# Patient Record
Sex: FEMALE | Race: WHITE | NOT HISPANIC OR LATINO | Employment: UNEMPLOYED | URBAN - METROPOLITAN AREA
[De-identification: names, ages, dates, MRNs, and addresses within clinical notes are randomized per-mention and may not be internally consistent; named-entity substitution may affect disease eponyms.]

---

## 2018-03-17 ENCOUNTER — APPOINTMENT (OUTPATIENT)
Dept: CT IMAGING | Facility: HOSPITAL | Age: 58
End: 2018-03-17

## 2018-03-17 ENCOUNTER — HOSPITAL ENCOUNTER (INPATIENT)
Facility: HOSPITAL | Age: 58
LOS: 4 days | Discharge: HOME | End: 2018-03-21
Attending: EMERGENCY MEDICINE | Admitting: INTERNAL MEDICINE

## 2018-03-17 DIAGNOSIS — R07.89 CHEST TIGHTNESS: ICD-10-CM

## 2018-03-17 DIAGNOSIS — J45.909 ASTHMA, UNSPECIFIED ASTHMA SEVERITY, UNSPECIFIED WHETHER COMPLICATED, UNSPECIFIED WHETHER PERSISTENT: ICD-10-CM

## 2018-03-17 DIAGNOSIS — Z02.89 REFERRED BY HEALTH CARE PROFESSIONAL: ICD-10-CM

## 2018-03-17 DIAGNOSIS — J18.9 PNEUMONIA OF BOTH LUNGS DUE TO INFECTIOUS ORGANISM, UNSPECIFIED PART OF LUNG: Primary | ICD-10-CM

## 2018-03-17 PROBLEM — R05.9 COUGH: Status: ACTIVE | Noted: 2018-03-17

## 2018-03-17 LAB
ALBUMIN SERPL-MCNC: 3.9 G/DL (ref 3.2–4.8)
ALBUMIN/GLOB SERPL: 1.1 G/DL (ref 1.5–2.5)
ALP SERPL-CCNC: 70 U/L (ref 25–100)
ALT SERPL W P-5'-P-CCNC: 43 U/L (ref 7–40)
ANION GAP SERPL CALCULATED.3IONS-SCNC: 6 MMOL/L (ref 3–11)
AST SERPL-CCNC: 38 U/L (ref 0–33)
BASOPHILS # BLD AUTO: 0.04 10*3/MM3 (ref 0–0.2)
BASOPHILS NFR BLD AUTO: 0.6 % (ref 0–1)
BILIRUB SERPL-MCNC: 0.8 MG/DL (ref 0.3–1.2)
BNP SERPL-MCNC: 120 PG/ML (ref 0–100)
BUN BLD-MCNC: 22 MG/DL (ref 9–23)
BUN/CREAT SERPL: 27.5 (ref 7–25)
CALCIUM SPEC-SCNC: 9.1 MG/DL (ref 8.7–10.4)
CHLORIDE SERPL-SCNC: 102 MMOL/L (ref 99–109)
CO2 SERPL-SCNC: 30 MMOL/L (ref 20–31)
CREAT BLD-MCNC: 0.8 MG/DL (ref 0.6–1.3)
D-LACTATE SERPL-SCNC: 1.3 MMOL/L (ref 0.5–2)
DEPRECATED RDW RBC AUTO: 45.2 FL (ref 37–54)
EOSINOPHIL # BLD AUTO: 0 10*3/MM3 (ref 0–0.3)
EOSINOPHIL NFR BLD AUTO: 0 % (ref 0–3)
ERYTHROCYTE [DISTWIDTH] IN BLOOD BY AUTOMATED COUNT: 14.9 % (ref 11.3–14.5)
FLUAV AG NPH QL: NEGATIVE
FLUBV AG NPH QL IA: NEGATIVE
GFR SERPL CREATININE-BSD FRML MDRD: 74 ML/MIN/1.73
GLOBULIN UR ELPH-MCNC: 3.4 GM/DL
GLUCOSE BLD-MCNC: 165 MG/DL (ref 70–100)
HCT VFR BLD AUTO: 39 % (ref 34.5–44)
HGB BLD-MCNC: 12.8 G/DL (ref 11.5–15.5)
HOLD SPECIMEN: NORMAL
HOLD SPECIMEN: NORMAL
IMM GRANULOCYTES # BLD: 0.03 10*3/MM3 (ref 0–0.03)
IMM GRANULOCYTES NFR BLD: 0.4 % (ref 0–0.6)
LYMPHOCYTES # BLD AUTO: 1.59 10*3/MM3 (ref 0.6–4.8)
LYMPHOCYTES NFR BLD AUTO: 22.3 % (ref 24–44)
MCH RBC QN AUTO: 27.2 PG (ref 27–31)
MCHC RBC AUTO-ENTMCNC: 32.8 G/DL (ref 32–36)
MCV RBC AUTO: 83 FL (ref 80–99)
MONOCYTES # BLD AUTO: 0.58 10*3/MM3 (ref 0–1)
MONOCYTES NFR BLD AUTO: 8.1 % (ref 0–12)
NEUTROPHILS # BLD AUTO: 4.88 10*3/MM3 (ref 1.5–8.3)
NEUTROPHILS NFR BLD AUTO: 68.6 % (ref 41–71)
PLAT MORPH BLD: NORMAL
PLATELET # BLD AUTO: 239 10*3/MM3 (ref 150–450)
PMV BLD AUTO: 9.3 FL (ref 6–12)
POTASSIUM BLD-SCNC: 3.4 MMOL/L (ref 3.5–5.5)
PROT SERPL-MCNC: 7.3 G/DL (ref 5.7–8.2)
RBC # BLD AUTO: 4.7 10*6/MM3 (ref 3.89–5.14)
RBC MORPH BLD: NORMAL
SODIUM BLD-SCNC: 138 MMOL/L (ref 132–146)
TROPONIN I SERPL-MCNC: 0.03 NG/ML (ref 0–0.07)
WBC MORPH BLD: NORMAL
WBC NRBC COR # BLD: 7.12 10*3/MM3 (ref 3.5–10.8)
WHOLE BLOOD HOLD SPECIMEN: NORMAL
WHOLE BLOOD HOLD SPECIMEN: NORMAL

## 2018-03-17 PROCEDURE — 87040 BLOOD CULTURE FOR BACTERIA: CPT | Performed by: EMERGENCY MEDICINE

## 2018-03-17 PROCEDURE — 94760 N-INVAS EAR/PLS OXIMETRY 1: CPT

## 2018-03-17 PROCEDURE — G0378 HOSPITAL OBSERVATION PER HR: HCPCS

## 2018-03-17 PROCEDURE — 99223 1ST HOSP IP/OBS HIGH 75: CPT | Performed by: INTERNAL MEDICINE

## 2018-03-17 PROCEDURE — 83880 ASSAY OF NATRIURETIC PEPTIDE: CPT

## 2018-03-17 PROCEDURE — 83605 ASSAY OF LACTIC ACID: CPT | Performed by: EMERGENCY MEDICINE

## 2018-03-17 PROCEDURE — 85007 BL SMEAR W/DIFF WBC COUNT: CPT

## 2018-03-17 PROCEDURE — 94799 UNLISTED PULMONARY SVC/PX: CPT

## 2018-03-17 PROCEDURE — 93005 ELECTROCARDIOGRAM TRACING: CPT | Performed by: EMERGENCY MEDICINE

## 2018-03-17 PROCEDURE — 80053 COMPREHEN METABOLIC PANEL: CPT

## 2018-03-17 PROCEDURE — 84484 ASSAY OF TROPONIN QUANT: CPT

## 2018-03-17 PROCEDURE — 71250 CT THORAX DX C-: CPT

## 2018-03-17 PROCEDURE — 99284 EMERGENCY DEPT VISIT MOD MDM: CPT

## 2018-03-17 PROCEDURE — 87804 INFLUENZA ASSAY W/OPTIC: CPT | Performed by: EMERGENCY MEDICINE

## 2018-03-17 PROCEDURE — 85025 COMPLETE CBC W/AUTO DIFF WBC: CPT

## 2018-03-17 PROCEDURE — 93005 ELECTROCARDIOGRAM TRACING: CPT

## 2018-03-17 PROCEDURE — 25010000002 DEXAMETHASONE PER 1 MG: Performed by: EMERGENCY MEDICINE

## 2018-03-17 RX ORDER — SODIUM CHLORIDE 9 MG/ML
100 INJECTION, SOLUTION INTRAVENOUS CONTINUOUS
Status: CANCELLED | OUTPATIENT
Start: 2018-03-17 | End: 2018-03-18

## 2018-03-17 RX ORDER — ONDANSETRON 4 MG/1
4 TABLET, FILM COATED ORAL EVERY 6 HOURS PRN
Status: DISCONTINUED | OUTPATIENT
Start: 2018-03-17 | End: 2018-03-21 | Stop reason: HOSPADM

## 2018-03-17 RX ORDER — LEVOFLOXACIN 750 MG/1
750 TABLET ORAL ONCE
Status: COMPLETED | OUTPATIENT
Start: 2018-03-17 | End: 2018-03-17

## 2018-03-17 RX ORDER — MAGNESIUM SULFATE HEPTAHYDRATE 40 MG/ML
2 INJECTION, SOLUTION INTRAVENOUS AS NEEDED
Status: DISCONTINUED | OUTPATIENT
Start: 2018-03-17 | End: 2018-03-21 | Stop reason: HOSPADM

## 2018-03-17 RX ORDER — DEXAMETHASONE SODIUM PHOSPHATE 4 MG/ML
4 INJECTION, SOLUTION INTRA-ARTICULAR; INTRALESIONAL; INTRAMUSCULAR; INTRAVENOUS; SOFT TISSUE ONCE
Status: COMPLETED | OUTPATIENT
Start: 2018-03-17 | End: 2018-03-17

## 2018-03-17 RX ORDER — POTASSIUM CHLORIDE 750 MG/1
40 CAPSULE, EXTENDED RELEASE ORAL AS NEEDED
Status: DISCONTINUED | OUTPATIENT
Start: 2018-03-17 | End: 2018-03-21 | Stop reason: HOSPADM

## 2018-03-17 RX ORDER — SODIUM CHLORIDE 0.9 % (FLUSH) 0.9 %
1-10 SYRINGE (ML) INJECTION AS NEEDED
Status: DISCONTINUED | OUTPATIENT
Start: 2018-03-17 | End: 2018-03-21 | Stop reason: HOSPADM

## 2018-03-17 RX ORDER — ONDANSETRON 2 MG/ML
4 INJECTION INTRAMUSCULAR; INTRAVENOUS EVERY 6 HOURS PRN
Status: DISCONTINUED | OUTPATIENT
Start: 2018-03-17 | End: 2018-03-21 | Stop reason: HOSPADM

## 2018-03-17 RX ORDER — ACETAMINOPHEN 325 MG/1
650 TABLET ORAL EVERY 4 HOURS PRN
Status: DISCONTINUED | OUTPATIENT
Start: 2018-03-17 | End: 2018-03-21 | Stop reason: HOSPADM

## 2018-03-17 RX ORDER — LEVOFLOXACIN 5 MG/ML
500 INJECTION, SOLUTION INTRAVENOUS EVERY 24 HOURS
Status: DISCONTINUED | OUTPATIENT
Start: 2018-03-18 | End: 2018-03-21 | Stop reason: HOSPADM

## 2018-03-17 RX ORDER — SODIUM CHLORIDE 0.9 % (FLUSH) 0.9 %
10 SYRINGE (ML) INJECTION AS NEEDED
Status: DISCONTINUED | OUTPATIENT
Start: 2018-03-17 | End: 2018-03-21 | Stop reason: HOSPADM

## 2018-03-17 RX ORDER — MAGNESIUM SULFATE HEPTAHYDRATE 40 MG/ML
4 INJECTION, SOLUTION INTRAVENOUS AS NEEDED
Status: DISCONTINUED | OUTPATIENT
Start: 2018-03-17 | End: 2018-03-21 | Stop reason: HOSPADM

## 2018-03-17 RX ORDER — POTASSIUM CHLORIDE 7.45 MG/ML
10 INJECTION INTRAVENOUS
Status: DISCONTINUED | OUTPATIENT
Start: 2018-03-17 | End: 2018-03-21 | Stop reason: HOSPADM

## 2018-03-17 RX ORDER — IPRATROPIUM BROMIDE AND ALBUTEROL SULFATE 2.5; .5 MG/3ML; MG/3ML
3 SOLUTION RESPIRATORY (INHALATION)
Status: DISCONTINUED | OUTPATIENT
Start: 2018-03-17 | End: 2018-03-19

## 2018-03-17 RX ORDER — POTASSIUM CHLORIDE 1.5 G/1.77G
40 POWDER, FOR SOLUTION ORAL AS NEEDED
Status: DISCONTINUED | OUTPATIENT
Start: 2018-03-17 | End: 2018-03-21 | Stop reason: HOSPADM

## 2018-03-17 RX ADMIN — LEVOFLOXACIN 750 MG: 750 TABLET, FILM COATED ORAL at 18:35

## 2018-03-17 RX ADMIN — IPRATROPIUM BROMIDE AND ALBUTEROL SULFATE 3 ML: 2.5; .5 SOLUTION RESPIRATORY (INHALATION) at 21:04

## 2018-03-17 RX ADMIN — DEXAMETHASONE SODIUM PHOSPHATE 4 MG: 4 INJECTION, SOLUTION INTRAMUSCULAR; INTRAVENOUS at 18:37

## 2018-03-17 NOTE — ED PROVIDER NOTES
Subjective   Jud Garnica is a 57 y.o. female with a hx of asthma who presents to the ED with c/o SoA. The patients states that she has been feeling unwell for over a week and also notes of associated congestion, chest pain, and a productive cough that produces green material. She was seen at  and was given azithromycin and steroids 1 week ago but states that the medication was ineffective. She reported to the [facility] today and was sent to the ED after a chest xray showed left lower lobe PNA. She denies fever, or any other acute complaints at this time. The patient has no hx of smoking.        History provided by:  Patient  Shortness of Breath   Severity:  Unable to specify  Onset quality:  Gradual  Duration: Over 1 week.  Timing:  Constant  Progression:  Worsening  Chronicity:  New  Relieved by:  Nothing  Worsened by:  Nothing  Ineffective treatments: Steroids and abx.  Associated symptoms: chest pain and cough    Associated symptoms: no fever    Risk factors: no tobacco use        Review of Systems   Constitutional: Negative for fever.   HENT: Positive for congestion.    Respiratory: Positive for cough and shortness of breath.    Cardiovascular: Positive for chest pain.   All other systems reviewed and are negative.      Past Medical History:   Diagnosis Date   • Asthma    • Uterine cancer        Allergies   Allergen Reactions   • Codeine Irritability   • Nyquil Multi-Symptom [Pseudoeph-Doxylamine-Dm-Apap] Irritability       Past Surgical History:   Procedure Laterality Date   • HYSTERECTOMY         History reviewed. No pertinent family history.    Social History     Social History   • Marital status: Single     Social History Main Topics   • Smoking status: Never Smoker   • Alcohol use No   • Drug use: No     Other Topics Concern   • Not on file         Objective   Physical Exam   Constitutional: She is oriented to person, place, and time. She appears well-developed and well-nourished. No  distress.   HENT:   Head: Normocephalic and atraumatic.   Nose: Nose normal.   Airway intact   Eyes: Conjunctivae are normal. No scleral icterus.   Neck: Normal range of motion. Neck supple.   Cardiovascular: Normal rate, regular rhythm, normal heart sounds and intact distal pulses.    No murmur heard.  Pulmonary/Chest: Effort normal. No respiratory distress. She has rhonchi in the right middle field and the right lower field.   Abdominal: Soft. Bowel sounds are normal.   Musculoskeletal: Normal range of motion. She exhibits no edema.   Neurological: She is alert and oriented to person, place, and time.   Skin: Skin is warm and dry.   Psychiatric: She has a normal mood and affect. Her behavior is normal.   Nursing note and vitals reviewed.      Procedures         ED Course  ED Course   Comment By Time   The patient was sent here by the urgent treatment center for further evaluation.  The patient has already been on antibiotics this week and has finished them.  The patient took azithromycin.  She reports no improvement or symptoms.  No exposure to smoke.  She does have a history of asthma.  The patient reports she is no longer having a fever. Valdez Johnston MD 03/17 1942   The patient has evidence of bilateral lower lobe pneumonia with milder disease in the left lingula and right middle lobe.  Secondary to the fact the patient has already completed a course of antibiotics with apparent failed therapy, we will admit her for further evaluation. Valdez Johnston MD 03/17 9107   Dr. Johnston discussed with Dr. Moran, hospitalist, who agrees to admit the patient. Jaylon SANCHEZ Taylor 03/17 1931     Recent Results (from the past 24 hour(s))   Comprehensive Metabolic Panel    Collection Time: 03/17/18  3:09 PM   Result Value Ref Range    Glucose 165 (H) 70 - 100 mg/dL    BUN 22 9 - 23 mg/dL    Creatinine 0.80 0.60 - 1.30 mg/dL    Sodium 138 132 - 146 mmol/L    Potassium 3.4 (L) 3.5 - 5.5 mmol/L    Chloride 102 99 - 109  mmol/L    CO2 30.0 20.0 - 31.0 mmol/L    Calcium 9.1 8.7 - 10.4 mg/dL    Total Protein 7.3 5.7 - 8.2 g/dL    Albumin 3.90 3.20 - 4.80 g/dL    ALT (SGPT) 43 (H) 7 - 40 U/L    AST (SGOT) 38 (H) 0 - 33 U/L    Alkaline Phosphatase 70 25 - 100 U/L    Total Bilirubin 0.8 0.3 - 1.2 mg/dL    eGFR Non African Amer 74 >60 mL/min/1.73    Globulin 3.4 gm/dL    A/G Ratio 1.1 (L) 1.5 - 2.5 g/dL    BUN/Creatinine Ratio 27.5 (H) 7.0 - 25.0    Anion Gap 6.0 3.0 - 11.0 mmol/L   BNP    Collection Time: 03/17/18  3:09 PM   Result Value Ref Range    .0 (H) 0.0 - 100.0 pg/mL   Light Blue Top    Collection Time: 03/17/18  3:09 PM   Result Value Ref Range    Extra Tube hold for add-on    Green Top (Gel)    Collection Time: 03/17/18  3:09 PM   Result Value Ref Range    Extra Tube Hold for add-ons.    Lavender Top    Collection Time: 03/17/18  3:09 PM   Result Value Ref Range    Extra Tube hold for add-on    Gold Top - SST    Collection Time: 03/17/18  3:09 PM   Result Value Ref Range    Extra Tube Hold for add-ons.    CBC Auto Differential    Collection Time: 03/17/18  3:09 PM   Result Value Ref Range    WBC 7.12 3.50 - 10.80 10*3/mm3    RBC 4.70 3.89 - 5.14 10*6/mm3    Hemoglobin 12.8 11.5 - 15.5 g/dL    Hematocrit 39.0 34.5 - 44.0 %    MCV 83.0 80.0 - 99.0 fL    MCH 27.2 27.0 - 31.0 pg    MCHC 32.8 32.0 - 36.0 g/dL    RDW 14.9 (H) 11.3 - 14.5 %    RDW-SD 45.2 37.0 - 54.0 fl    MPV 9.3 6.0 - 12.0 fL    Platelets 239 150 - 450 10*3/mm3    Neutrophil % 68.6 41.0 - 71.0 %    Lymphocyte % 22.3 (L) 24.0 - 44.0 %    Monocyte % 8.1 0.0 - 12.0 %    Eosinophil % 0.0 0.0 - 3.0 %    Basophil % 0.6 0.0 - 1.0 %    Immature Grans % 0.4 0.0 - 0.6 %    Neutrophils, Absolute 4.88 1.50 - 8.30 10*3/mm3    Lymphocytes, Absolute 1.59 0.60 - 4.80 10*3/mm3    Monocytes, Absolute 0.58 0.00 - 1.00 10*3/mm3    Eosinophils, Absolute 0.00 0.00 - 0.30 10*3/mm3    Basophils, Absolute 0.04 0.00 - 0.20 10*3/mm3    Immature Grans, Absolute 0.03 0.00 - 0.03 10*3/mm3  "  Scan Slide    Collection Time: 03/17/18  3:09 PM   Result Value Ref Range    RBC Morphology Normal Normal    WBC Morphology Normal Normal    Platelet Morphology Normal Normal   POC Troponin, Rapid    Collection Time: 03/17/18  3:14 PM   Result Value Ref Range    Troponin I 0.03 0.00 - 0.07 ng/mL   Lactic Acid, Plasma    Collection Time: 03/17/18  6:05 PM   Result Value Ref Range    Lactate 1.3 0.5 - 2.0 mmol/L   Influenza Antigen, Rapid - Swab, Nasopharynx    Collection Time: 03/17/18  6:10 PM   Result Value Ref Range    Influenza A Ag, EIA Negative Negative    Influenza B Ag, EIA Negative Negative     Note: In addition to lab results from this visit, the labs listed above may include labs taken at another facility or during a different encounter within the last 24 hours. Please correlate lab times with ED admission and discharge times for further clarification of the services performed during this visit.    CT Chest Without Contrast   Preliminary Result   Multifocal pneumonia with moderate disease of both medial   right and left lower lobes and much milder lingular and middle lobe   disease.       DICTATED:     03/17/2018   EDITED/ls :     03/17/2018             Vitals:    03/17/18 1830 03/17/18 1953 03/17/18 2041 03/17/18 2104   BP: 109/66 128/79 118/78    BP Location:   Left arm    Patient Position:   Sitting    Pulse:  85 74 67   Resp:  16 16 16   Temp:  98.1 °F (36.7 °C) 98.1 °F (36.7 °C)    TempSrc:   Oral    SpO2: 95% 97%  94%   Weight:   53.7 kg (118 lb 6.4 oz)    Height:   167.6 cm (66\")      Medications   sodium chloride 0.9 % flush 10 mL (not administered)   levoFLOXacin (LEVAQUIN) 500 mg/100 mL D5W (premix) (LEVAQUIN) 500 mg (not administered)   sodium chloride 0.9 % flush 1-10 mL (not administered)   enoxaparin (LOVENOX) syringe 40 mg (40 mg Subcutaneous Not Given 3/17/18 2102)   acetaminophen (TYLENOL) tablet 650 mg (not administered)   potassium chloride (MICRO-K) CR capsule 40 mEq (not " administered)     Or   potassium chloride (KLOR-CON) packet 40 mEq (not administered)     Or   potassium chloride 10 mEq in 100 mL IVPB (not administered)   Magnesium Sulfate 2 gram Bolus, followed by 8 gram infusion (total Mg dose 10 grams)- Mg less than or equal to 1mg/dL (not administered)     Or   Magnesium Sulfate 6 gram Infusion (2 gm x 3) -Mg 1.1 -1.5 mg/dL (not administered)     Or   magnesium sulfate 4 gram infusion- Mg 1.6-1.9 mg/dL (not administered)   ondansetron (ZOFRAN) tablet 4 mg (not administered)     Or   ondansetron (ZOFRAN) injection 4 mg (not administered)   ipratropium-albuterol (DUO-NEB) nebulizer solution 3 mL (3 mL Nebulization Given 3/17/18 2104)   HYDROcod Polst-CPM Polst ER (TUSSIONEX PENNKINETIC) 10-8 MG/5ML ER suspension 2.5 mL (not administered)   pneumococcal polysaccharide 23-valent (PNEUMOVAX-23) vaccine 0.5 mL (not administered)   levoFLOXacin (LEVAQUIN) tablet 750 mg (750 mg Oral Given 3/17/18 1835)   dexamethasone (DECADRON) injection 4 mg (4 mg Intravenous Given 3/17/18 1837)     ECG/EMG Results (last 24 hours)     Procedure Component Value Units Date/Time    ECG 12 Lead [[id number]] Collected:  03/17/18 1504     Updated:  03/17/18 1505                        MDM  Number of Diagnoses or Management Options  Asthma, unspecified asthma severity, unspecified whether complicated, unspecified whether persistent:   Chest tightness:   Pneumonia of both lungs due to infectious organism, unspecified part of lung:   Referred by health care professional:   Diagnosis management comments: ECG/EMG Results (last 24 hours)     Procedure Component Value Units Date/Time    ECG 12 Lead ([id number]) Collected:  03/17/18 1504     Updated:  03/17/18 1505             Amount and/or Complexity of Data Reviewed  Clinical lab tests: reviewed  Tests in the radiology section of CPT®: reviewed  Review and summarize past medical records: yes  Discuss the patient with other providers: yes  Independent  visualization of images, tracings, or specimens: yes        Final diagnoses:   Pneumonia of both lungs due to infectious organism, unspecified part of lung   Chest tightness   Asthma, unspecified asthma severity, unspecified whether complicated, unspecified whether persistent   Referred by health care professional     EMR Dragon/Transcription disclaimer:  Much of this encounter note is an electronic transcription/translation of spoken language to printed text. The electronic translation of spoken language may permit erroneous, or at times, nonsensical words or phrases to be inadvertently transcribed; Although I have reviewed the note for such errors, some may still exist.    Documentation assistance provided by jocelyn Taylor.  Information recorded by the jocelyn was done at my direction and has been verified and validated by me.     Jaylon Taylor  03/17/18 1728       Jaylon Taylor  03/17/18 1847       Jaylon Taylor  03/17/18 1851       Jaylon Taylor  03/17/18 1906       Valdez Johnston MD  03/17/18 2638

## 2018-03-18 ENCOUNTER — APPOINTMENT (OUTPATIENT)
Dept: CARDIOLOGY | Facility: HOSPITAL | Age: 58
End: 2018-03-18
Attending: INTERNAL MEDICINE

## 2018-03-18 LAB
ANION GAP SERPL CALCULATED.3IONS-SCNC: 7 MMOL/L (ref 3–11)
BUN BLD-MCNC: 15 MG/DL (ref 9–23)
BUN/CREAT SERPL: 21.4 (ref 7–25)
CALCIUM SPEC-SCNC: 8.6 MG/DL (ref 8.7–10.4)
CHLORIDE SERPL-SCNC: 105 MMOL/L (ref 99–109)
CO2 SERPL-SCNC: 27 MMOL/L (ref 20–31)
CREAT BLD-MCNC: 0.7 MG/DL (ref 0.6–1.3)
DEPRECATED RDW RBC AUTO: 45 FL (ref 37–54)
ERYTHROCYTE [DISTWIDTH] IN BLOOD BY AUTOMATED COUNT: 14.8 % (ref 11.3–14.5)
GFR SERPL CREATININE-BSD FRML MDRD: 86 ML/MIN/1.73
GLUCOSE BLD-MCNC: 121 MG/DL (ref 70–100)
HCT VFR BLD AUTO: 36.8 % (ref 34.5–44)
HGB BLD-MCNC: 11.9 G/DL (ref 11.5–15.5)
MCH RBC QN AUTO: 26.7 PG (ref 27–31)
MCHC RBC AUTO-ENTMCNC: 32.3 G/DL (ref 32–36)
MCV RBC AUTO: 82.7 FL (ref 80–99)
PLATELET # BLD AUTO: 259 10*3/MM3 (ref 150–450)
PMV BLD AUTO: 9.4 FL (ref 6–12)
POTASSIUM BLD-SCNC: 3.7 MMOL/L (ref 3.5–5.5)
RBC # BLD AUTO: 4.45 10*6/MM3 (ref 3.89–5.14)
SODIUM BLD-SCNC: 139 MMOL/L (ref 132–146)
WBC NRBC COR # BLD: 6.34 10*3/MM3 (ref 3.5–10.8)

## 2018-03-18 PROCEDURE — 85027 COMPLETE CBC AUTOMATED: CPT | Performed by: INTERNAL MEDICINE

## 2018-03-18 PROCEDURE — G0378 HOSPITAL OBSERVATION PER HR: HCPCS

## 2018-03-18 PROCEDURE — 25010000002 LEVOFLOXACIN PER 250 MG: Performed by: INTERNAL MEDICINE

## 2018-03-18 PROCEDURE — 87254 VIRUS INOCULATION SHELL VIA: CPT | Performed by: INTERNAL MEDICINE

## 2018-03-18 PROCEDURE — 94799 UNLISTED PULMONARY SVC/PX: CPT

## 2018-03-18 PROCEDURE — 94640 AIRWAY INHALATION TREATMENT: CPT

## 2018-03-18 PROCEDURE — 99233 SBSQ HOSP IP/OBS HIGH 50: CPT | Performed by: FAMILY MEDICINE

## 2018-03-18 PROCEDURE — 94760 N-INVAS EAR/PLS OXIMETRY 1: CPT

## 2018-03-18 PROCEDURE — 25010000002 ENOXAPARIN PER 10 MG: Performed by: INTERNAL MEDICINE

## 2018-03-18 PROCEDURE — 93306 TTE W/DOPPLER COMPLETE: CPT | Performed by: INTERNAL MEDICINE

## 2018-03-18 PROCEDURE — 93306 TTE W/DOPPLER COMPLETE: CPT

## 2018-03-18 PROCEDURE — 25010000002 METHYLPREDNISOLONE PER 125 MG: Performed by: FAMILY MEDICINE

## 2018-03-18 PROCEDURE — 80048 BASIC METABOLIC PNL TOTAL CA: CPT | Performed by: INTERNAL MEDICINE

## 2018-03-18 RX ORDER — METHYLPREDNISOLONE SODIUM SUCCINATE 125 MG/2ML
60 INJECTION, POWDER, LYOPHILIZED, FOR SOLUTION INTRAMUSCULAR; INTRAVENOUS EVERY 8 HOURS
Status: DISCONTINUED | OUTPATIENT
Start: 2018-03-18 | End: 2018-03-18

## 2018-03-18 RX ORDER — METHYLPREDNISOLONE SODIUM SUCCINATE 125 MG/2ML
60 INJECTION, POWDER, LYOPHILIZED, FOR SOLUTION INTRAMUSCULAR; INTRAVENOUS EVERY 12 HOURS SCHEDULED
Status: DISCONTINUED | OUTPATIENT
Start: 2018-03-18 | End: 2018-03-19

## 2018-03-18 RX ADMIN — IPRATROPIUM BROMIDE AND ALBUTEROL SULFATE 3 ML: 2.5; .5 SOLUTION RESPIRATORY (INHALATION) at 07:16

## 2018-03-18 RX ADMIN — METHYLPREDNISOLONE SODIUM SUCCINATE 60 MG: 125 INJECTION, POWDER, FOR SOLUTION INTRAMUSCULAR; INTRAVENOUS at 20:18

## 2018-03-18 RX ADMIN — IPRATROPIUM BROMIDE AND ALBUTEROL SULFATE 3 ML: 2.5; .5 SOLUTION RESPIRATORY (INHALATION) at 13:31

## 2018-03-18 RX ADMIN — IPRATROPIUM BROMIDE AND ALBUTEROL SULFATE 3 ML: 2.5; .5 SOLUTION RESPIRATORY (INHALATION) at 20:06

## 2018-03-18 RX ADMIN — IPRATROPIUM BROMIDE AND ALBUTEROL SULFATE 3 ML: 2.5; .5 SOLUTION RESPIRATORY (INHALATION) at 15:56

## 2018-03-18 RX ADMIN — HYDROCODONE POLISTIREX AND CHLORPHENIRAMINE POLISTIREX 2.5 ML: 10; 8 SUSPENSION, EXTENDED RELEASE ORAL at 20:18

## 2018-03-18 RX ADMIN — ENOXAPARIN SODIUM 40 MG: 100 INJECTION SUBCUTANEOUS at 20:18

## 2018-03-18 RX ADMIN — LEVOFLOXACIN 500 MG: 5 INJECTION, SOLUTION INTRAVENOUS at 18:27

## 2018-03-18 NOTE — PROGRESS NOTES
[facility] Medicine Services  PROGRESS NOTE    Patient Name: Jud Garnica  : 1960  MRN: [id number]    Date of Admission: 3/17/2018  Length of Stay: 0  Primary Care Physician: No Known Provider    Subjective   Subjective     CC:  cough    HPI:  Pt sitting up  In bed no family at bedside. Still feels sob, chest feels tight.     Review of Systems  Gen- No fevers, chills  CV- No chest pain, palpitations  Resp- pos cough, pos dyspnea  GI- No N/V/D, abd pain      Otherwise ROS is negative except as mentioned in the HPI.    Objective   Objective     Vital Signs:   Temp:  [98.1 °F (36.7 °C)-98.9 °F (37.2 °C)] 98.9 °F (37.2 °C)  Heart Rate:  [67-85] 85  Resp:  [16] 16  BP: (107-128)/(64-79) 113/72        Physical Exam:  Constitutional: mildly labored, awake, alert, room air   HENT: NCAT, mucous membranes moist  Respiratory: decreased air movement, minimal wheeze b/l   Cardiovascular: RRR, no murmurs, rubs, or gallops, palpable pedal pulses bilaterally  Gastrointestinal: Positive bowel sounds, soft, nontender, nondistended  Musculoskeletal: No bilateral ankle edema  Psychiatric: Appropriate affect, cooperative  Neurologic: Oriented x 3, strength symmetric in all extremities, Cranial Nerves grossly intact to confrontation, speech clear  Skin: No rashes      Results Reviewed:  I have personally reviewed current lab, radiology, and data and agree.      Results from last 7 days  Lab Units 18  0812 18  1509   WBC 10*3/mm3 6.34 7.12   HEMOGLOBIN g/dL 11.9 12.8   HEMATOCRIT % 36.8 39.0   PLATELETS 10*3/mm3 259 239       Results from last 7 days  Lab Units 18  0812 18  1509   SODIUM mmol/L 139 138   POTASSIUM mmol/L 3.7 3.4*   CHLORIDE mmol/L 105 102   CO2 mmol/L 27.0 30.0   BUN mg/dL 15 22   CREATININE mg/dL 0.70 0.80   GLUCOSE mg/dL 121* 165*   CALCIUM mg/dL 8.6* 9.1   ALT (SGPT) U/L  --  43*   AST (SGOT) U/L  --  38*     Estimated Creatinine Clearance: 74.3 mL/min  (by C-G formula based on SCr of 0.7 mg/dL).  BNP   Date Value Ref Range Status   03/17/2018 120.0 (H) 0.0 - 100.0 pg/mL Final     Comment:     Results may be falsely decreased if patient taking Biotin.     No results found for: PHART    Microbiology Results Abnormal     Procedure Component Value - Date/Time    Blood Culture - Blood, [[id number]]  (Normal) Collected:  03/17/18 1800    Lab Status:  Preliminary result Specimen:  Blood from Arm, Right Updated:  03/18/18 0846     Blood Culture No growth at less than 24 hours    Blood Culture - Blood, [[id number]]  (Normal) Collected:  03/17/18 1940    Lab Status:  Preliminary result Specimen:  Blood from Arm, Left Updated:  03/18/18 0846     Blood Culture No growth at less than 24 hours    Influenza Antigen, Rapid - Swab, Nasopharynx [[id number]]  (Normal) Collected:  03/17/18 1810    Lab Status:  Final result Specimen:  Swab from Nasopharynx Updated:  03/17/18 1845     Influenza A Ag, EIA Negative     Influenza B Ag, EIA Negative          Imaging Results (last 24 hours)     Procedure Component Value Units Date/Time    CT Chest Without Contrast [[id number]] Collected:  03/17/18 1905     Updated:  03/17/18 2317    Narrative:       EXAMINATION: CT CHEST WO CONTRAST - 03/17/2018     INDICATION: Shortness of air, cough, tightness.     TECHNIQUE: The radiation dose reduction device was turned on for each  scan per the ALARA (As Low as Reasonably Achievable) protocol.     COMPARISON: Chest, 2 views, today's date.     FINDINGS: Patchy airspace disease is seen in both medial lower lobes  with small focal areas of consolidation and to a lesser degree in the  right middle lobe and lingula, with peribronchial thickening. The upper  lungs appear clear. No effusion is seen. Mediastinal window images show  no evidence of adenopathy or pericardial effusion.     Included images of the upper abdomen show no gross abnormalities of the  visualized portions of the liver, spleen, pancreatic  tail, adrenal  glands, or upper renal poles.       Impression:       Multifocal pneumonia with moderate disease of both medial  right and left lower lobes and much milder lingular and middle lobe  disease.     DICTATED:     03/17/2018  EDITED/ls :     03/17/2018      This report was finalized on 3/17/2018 11:15 PM by DR. Corwin Bowser MD.                I have reviewed the medications.    Assessment/Plan   Assessment / Plan     Hospital Problem List     * (Principal)Pneumonia of both lungs due to infectious organism    Cough    Asthma             Brief Hospital Course to date:  Jud Garnica is a 57 y.o. female presenting from home with persistent cough that did not improve with PO azithro outside with CT chest findings concerning for pneumonia.       Assessment & Plan:  PNA  Cough  Asthma  -- likely either a bacterial pneumonia which did not respond to Azithromycin vs viral pna vs a resolving pneumonia which has been slow to improve due to her asthma.  -- asthma likely prolonging process, lstill feels very tight and wheezy, will order steroids. --Reasonable to treat with levaquin .  --Flu was negative. Will send viral respiratory panel and sputum culture.   --Cough suppressants.      Elevated BNP  --echo pending      --Probably home tomorrow on PO levaquin if doing well.     DVT prophylaxis: Lovenox    CODE STATUS: Full Code    Disposition: I expect the patient to be discharged home in am       Electronically signed by Kailyn Lindquist DO, 03/18/18, 6:19 PM.

## 2018-03-18 NOTE — PLAN OF CARE
Problem: Patient Care Overview  Goal: Plan of Care Review  Outcome: Ongoing (interventions implemented as appropriate)   03/18/18 0501   Coping/Psychosocial   Plan of Care Reviewed With patient   Coping/Psychosocial   Patient Agreement with Plan of Care agrees   Plan of Care Review   Progress improving   OTHER   Outcome Summary Pt VSS. No compaints of SOA or pain. on RA- sats above 94%.        Problem: Pneumonia (Adult)  Goal: Signs and Symptoms of Listed Potential Problems Will be Absent, Minimized or Managed (Pneumonia)  Outcome: Ongoing (interventions implemented as appropriate)

## 2018-03-18 NOTE — H&P
[facility] Medicine Services  HISTORY AND PHYSICAL    Patient Name: Jud Garnica  : 1960  MRN: [id number]  Primary Care Physician: No Known Provider    Subjective   Subjective     Chief Complaint: cough, feeling poorly    HPI:  Jud Garnica is a 57 y.o. female presenting at the direction of [facility] where she presented with persistent cough. The patient was ill last week with a viral diarrheal illness and shortly after jackson that developed a cough and SOA. She was treated by here PCp for presumed pneumonia with PO steroids and PO azithromycin. She completed those and felt no better so she went to [facility] for treatment today, who directed her to ED. Currently she still says she feels SOA which was not improved by nebs. Also just generally feels poorly. No fever/chills.    Review of Systems   Gen- No fevers, chills  CV- No chest pain, palpitations  Resp- No cough, dyspnea  GI- No N/V/D, abd pain    Otherwise 10-system ROS reviewed and is negative except as mentioned in the HPI.    Personal History     Past Medical History:   Diagnosis Date   • Asthma    • Uterine cancer        Past Surgical History:   Procedure Laterality Date   • HYSTERECTOMY         Family History: family history is not on file.     Social History:  reports that she has never smoked. She does not have any smokeless tobacco history on file. She reports that she does not drink alcohol or use drugs.  Social History     Social History Narrative   • No narrative on file       Medications:    (Not in a hospital admission)    Allergies   Allergen Reactions   • Codeine Irritability   • Nyquil Multi-Symptom [Pseudoeph-Doxylamine-Dm-Apap] Irritability       Objective   Objective     Vital Signs:   Temp:  [97.6 °F (36.4 °C)-98.1 °F (36.7 °C)] 98.1 °F (36.7 °C)  Heart Rate:  [85-97] 85  Resp:  [12-18] 16  BP: (102-128)/(62-79) 128/79        Physical Exam   Constitutional: No acute distress, awake, alert, thin, appears  to feel poorly  Eyes: PERRLA, sclerae anicteric, no conjunctival injection  HENT: NCAT, mucous membranes moist  Neck: Supple, no thyromegaly, no lymphadenopathy, trachea midline  Respiratory: Clear to auscultation bilaterally, nonlabored respirations   Cardiovascular: RRR, no murmurs, rubs, or gallops, palpable pedal pulses bilaterally  Gastrointestinal: Positive bowel sounds, soft, nontender, nondistended  Musculoskeletal: No bilateral ankle edema, no clubbing or cyanosis to extremities  Psychiatric: Appropriate affect, cooperative  Neurologic: Oriented x 3, strength symmetric in all extremities, Cranial Nerves grossly intact to confrontation, speech clear  Skin: No rashes    Results Reviewed:  I have personally reviewed current lab, radiology, and data and agree.      Results from last 7 days  Lab Units 03/17/18  1509   WBC 10*3/mm3 7.12   HEMOGLOBIN g/dL 12.8   HEMATOCRIT % 39.0   PLATELETS 10*3/mm3 239       Results from last 7 days  Lab Units 03/17/18  1509   SODIUM mmol/L 138   POTASSIUM mmol/L 3.4*   CHLORIDE mmol/L 102   CO2 mmol/L 30.0   BUN mg/dL 22   CREATININE mg/dL 0.80   GLUCOSE mg/dL 165*   CALCIUM mg/dL 9.1   ALT (SGPT) U/L 43*   AST (SGOT) U/L 38*     Estimated Creatinine Clearance: 63.2 mL/min (by C-G formula based on SCr of 0.8 mg/dL).  Brief Urine Lab Results     None        BNP   Date Value Ref Range Status   03/17/2018 120.0 (H) 0.0 - 100.0 pg/mL Final     Comment:     Results may be falsely decreased if patient taking Biotin.     No results found for: PHART     CT chest personally reviewed with bilateral airspace disease concerning for pneumonia. Agree with interpretation.   Imaging Results (last 24 hours)     Procedure Component Value Units Date/Time    CT Chest Without Contrast [[id number]] Collected:  03/17/18 1905     Updated:  03/17/18 1905    Narrative:       EXAMINATION: CT CHEST WO CONTRAST - 03/17/2018     INDICATION: Shortness of air, cough, tightness.     TECHNIQUE: The radiation  dose reduction device was turned on for each  scan per the ALARA (As Low as Reasonably Achievable) protocol.     COMPARISON: Chest, 2 views, today's date.     FINDINGS: Patchy airspace disease is seen in both medial lower lobes  with small focal areas of consolidation and to a lesser degree in the  right middle lobe and lingula, with peribronchial thickening. The upper  lungs appear clear. No effusion is seen. Mediastinal window images show  no evidence of adenopathy or pericardial effusion.     Included images of the upper abdomen show no gross abnormalities of the  visualized portions of the liver, spleen, pancreatic tail, adrenal  glands, or upper renal poles.       Impression:       Multifocal pneumonia with moderate disease of both medial  right and left lower lobes and much milder lingular and middle lobe  disease.     DICTATED:     03/17/2018  EDITED/ls :     03/17/2018                 Assessment/Plan   Assessment / Plan     Hospital Problem List     * (Principal)Pneumonia of both lungs due to infectious organism    Cough    Asthma        58 y/o female presenting from home with persistent cough that did not improve with PO azithro with CT chest findings concerning for pneumonia.     Assessment & Plan:    PNA  Cough  Asthma  --This is either likely a bacterial pneumonia which did not respond to Azithromycin vs viral pna vs a resolving pneumonia which has been slow to improve due to her asthma. Her labs are reassuring and she does not appear toxic. Her asthma also does not seem active at this time so will hold on further steroids. Reasonable to treat with levaquin and observe.  --Flu swab was negative. Will send viral respiratory panel and sputum culture.   --Cough suppressants.     Elevated BNP  --Check echo in am.    --Probably home tomorrow on PO levaquin if doing well.    DVT prophylaxis: Lovenox    CODE STATUS: Full    Admission Status:  I believe this patient meets OBSERVATION status, however if further  evaluation or treatment plans warrant, status may change.  Based upon current information, I predict patient's care encounter to be less than or equal to 2 midnights.      Electronically signed by Nichole Moran II, DO, 03/17/18, 8:32 PM.

## 2018-03-19 LAB
ANION GAP SERPL CALCULATED.3IONS-SCNC: 8 MMOL/L (ref 3–11)
BH CV ECHO MEAS - AO MAX PG (FULL): 3.9 MMHG
BH CV ECHO MEAS - AO MAX PG: 5.6 MMHG
BH CV ECHO MEAS - AO MEAN PG (FULL): 2.7 MMHG
BH CV ECHO MEAS - AO MEAN PG: 3.5 MMHG
BH CV ECHO MEAS - AO ROOT AREA (BSA CORRECTED): 1.8
BH CV ECHO MEAS - AO ROOT AREA: 6.2 CM^2
BH CV ECHO MEAS - AO ROOT DIAM: 2.8 CM
BH CV ECHO MEAS - AO V2 MAX: 118.1 CM/SEC
BH CV ECHO MEAS - AO V2 MEAN: 88.4 CM/SEC
BH CV ECHO MEAS - AO V2 VTI: 25.2 CM
BH CV ECHO MEAS - BSA(HAYCOCK): 1.6 M^2
BH CV ECHO MEAS - BSA: 1.6 M^2
BH CV ECHO MEAS - BZI_BMI: 19 KILOGRAMS/M^2
BH CV ECHO MEAS - BZI_METRIC_HEIGHT: 167.6 CM
BH CV ECHO MEAS - BZI_METRIC_WEIGHT: 53.5 KG
BH CV ECHO MEAS - EDV(CUBED): 86.9 ML
BH CV ECHO MEAS - EDV(TEICH): 89.1 ML
BH CV ECHO MEAS - EF(CUBED): 63.6 %
BH CV ECHO MEAS - EF(TEICH): 55.3 %
BH CV ECHO MEAS - ESV(CUBED): 31.6 ML
BH CV ECHO MEAS - ESV(TEICH): 39.8 ML
BH CV ECHO MEAS - FS: 28.6 %
BH CV ECHO MEAS - IVS/LVPW: 0.91
BH CV ECHO MEAS - IVSD: 0.79 CM
BH CV ECHO MEAS - LA DIMENSION: 2.6 CM
BH CV ECHO MEAS - LA/AO: 0.92
BH CV ECHO MEAS - LAT PEAK E' VEL: 15 CM/SEC
BH CV ECHO MEAS - LV MASS(C)D: 115.3 GRAMS
BH CV ECHO MEAS - LV MASS(C)DI: 72.1 GRAMS/M^2
BH CV ECHO MEAS - LV MAX PG: 1.7 MMHG
BH CV ECHO MEAS - LV MEAN PG: 0.81 MMHG
BH CV ECHO MEAS - LV V1 MAX: 64.7 CM/SEC
BH CV ECHO MEAS - LV V1 MEAN: 40.7 CM/SEC
BH CV ECHO MEAS - LV V1 VTI: 13.2 CM
BH CV ECHO MEAS - LVIDD: 4.4 CM
BH CV ECHO MEAS - LVIDS: 3.2 CM
BH CV ECHO MEAS - LVPWD: 0.86 CM
BH CV ECHO MEAS - MED PEAK E' VEL: 10.3 CM/SEC
BH CV ECHO MEAS - MV A MAX VEL: 45.9 CM/SEC
BH CV ECHO MEAS - MV E MAX VEL: 57.3 CM/SEC
BH CV ECHO MEAS - MV E/A: 1.2
BH CV ECHO MEAS - PA ACC SLOPE: 632.9 CM/SEC^2
BH CV ECHO MEAS - PA ACC TIME: 0.12 SEC
BH CV ECHO MEAS - PA MAX PG: 3.4 MMHG
BH CV ECHO MEAS - PA PR(ACCEL): 26.7 MMHG
BH CV ECHO MEAS - PA V2 MAX: 92.3 CM/SEC
BH CV ECHO MEAS - SI(AO): 98.3 ML/M^2
BH CV ECHO MEAS - SI(CUBED): 34.6 ML/M^2
BH CV ECHO MEAS - SI(TEICH): 30.9 ML/M^2
BH CV ECHO MEAS - SV(AO): 157.1 ML
BH CV ECHO MEAS - SV(CUBED): 55.3 ML
BH CV ECHO MEAS - SV(TEICH): 49.3 ML
BH CV ECHO MEAS - TAPSE (>1.6): 2.2 CM2
BH CV VAS BP RIGHT ARM: NORMAL MMHG
BH CV XLRA - RV BASE: 3 CM
BH CV XLRA - RV LENGTH: 7.1 CM
BH CV XLRA - RV MID: 2.8 CM
BH CV XLRA - TDI S': 12.3 CM/SEC
BUN BLD-MCNC: 14 MG/DL (ref 9–23)
BUN/CREAT SERPL: 23.3 (ref 7–25)
CALCIUM SPEC-SCNC: 8.7 MG/DL (ref 8.7–10.4)
CHLORIDE SERPL-SCNC: 105 MMOL/L (ref 99–109)
CO2 SERPL-SCNC: 25 MMOL/L (ref 20–31)
CREAT BLD-MCNC: 0.6 MG/DL (ref 0.6–1.3)
DEPRECATED RDW RBC AUTO: 44.4 FL (ref 37–54)
E/E' RATIO: 3.8
ERYTHROCYTE [DISTWIDTH] IN BLOOD BY AUTOMATED COUNT: 14.8 % (ref 11.3–14.5)
GFR SERPL CREATININE-BSD FRML MDRD: 103 ML/MIN/1.73
GLUCOSE BLD-MCNC: 147 MG/DL (ref 70–100)
HCT VFR BLD AUTO: 37.4 % (ref 34.5–44)
HGB BLD-MCNC: 12.2 G/DL (ref 11.5–15.5)
LEFT ATRIUM VOLUME INDEX: 33 ML/M2
MCH RBC QN AUTO: 26.9 PG (ref 27–31)
MCHC RBC AUTO-ENTMCNC: 32.6 G/DL (ref 32–36)
MCV RBC AUTO: 82.6 FL (ref 80–99)
PLATELET # BLD AUTO: 323 10*3/MM3 (ref 150–450)
PMV BLD AUTO: 9.1 FL (ref 6–12)
POTASSIUM BLD-SCNC: 4.2 MMOL/L (ref 3.5–5.5)
RBC # BLD AUTO: 4.53 10*6/MM3 (ref 3.89–5.14)
SODIUM BLD-SCNC: 138 MMOL/L (ref 132–146)
WBC NRBC COR # BLD: 6.98 10*3/MM3 (ref 3.5–10.8)

## 2018-03-19 PROCEDURE — 25010000002 METHYLPREDNISOLONE PER 125 MG: Performed by: FAMILY MEDICINE

## 2018-03-19 PROCEDURE — 85027 COMPLETE CBC AUTOMATED: CPT | Performed by: FAMILY MEDICINE

## 2018-03-19 PROCEDURE — 80048 BASIC METABOLIC PNL TOTAL CA: CPT | Performed by: FAMILY MEDICINE

## 2018-03-19 PROCEDURE — 25010000002 LEVOFLOXACIN PER 250 MG: Performed by: INTERNAL MEDICINE

## 2018-03-19 PROCEDURE — 99232 SBSQ HOSP IP/OBS MODERATE 35: CPT | Performed by: FAMILY MEDICINE

## 2018-03-19 PROCEDURE — 94640 AIRWAY INHALATION TREATMENT: CPT

## 2018-03-19 PROCEDURE — 94760 N-INVAS EAR/PLS OXIMETRY 1: CPT

## 2018-03-19 RX ORDER — PREDNISONE 20 MG/1
20 TABLET ORAL DAILY
Status: DISCONTINUED | OUTPATIENT
Start: 2018-03-19 | End: 2018-03-19

## 2018-03-19 RX ORDER — PREDNISONE 20 MG/1
40 TABLET ORAL DAILY
Status: DISCONTINUED | OUTPATIENT
Start: 2018-03-20 | End: 2018-03-21 | Stop reason: HOSPADM

## 2018-03-19 RX ORDER — IPRATROPIUM BROMIDE AND ALBUTEROL SULFATE 2.5; .5 MG/3ML; MG/3ML
3 SOLUTION RESPIRATORY (INHALATION) EVERY 4 HOURS PRN
Status: DISCONTINUED | OUTPATIENT
Start: 2018-03-19 | End: 2018-03-21 | Stop reason: HOSPADM

## 2018-03-19 RX ADMIN — LEVOFLOXACIN 500 MG: 5 INJECTION, SOLUTION INTRAVENOUS at 17:55

## 2018-03-19 RX ADMIN — IPRATROPIUM BROMIDE AND ALBUTEROL SULFATE 3 ML: 2.5; .5 SOLUTION RESPIRATORY (INHALATION) at 07:05

## 2018-03-19 RX ADMIN — METHYLPREDNISOLONE SODIUM SUCCINATE 60 MG: 125 INJECTION, POWDER, FOR SOLUTION INTRAMUSCULAR; INTRAVENOUS at 17:25

## 2018-03-19 RX ADMIN — METHYLPREDNISOLONE SODIUM SUCCINATE 60 MG: 125 INJECTION, POWDER, FOR SOLUTION INTRAMUSCULAR; INTRAVENOUS at 06:03

## 2018-03-19 NOTE — PROGRESS NOTES
Discharge Planning Assessment  [facility]     Patient Name: Jud Garnica  MRN: [id number]  Today's Date: 3/19/2018    Admit Date: 3/17/2018          Discharge Needs Assessment     Row Name 03/19/18 1234       Living Environment    Lives With other (see comments)   ex-    Current Living Arrangements home/apartment/condo    Primary Care Provided by self    Provides Primary Care For no one    Quality of Family Relationships supportive    Able to Return to Prior Arrangements yes       Resource/Environmental Concerns    Transportation Concerns car, none       Transition Planning    Patient/Family Anticipates Transition to home    Patient/Family Anticipated Services at Transition none    Transportation Anticipated family or friend will provide       Discharge Needs Assessment    Readmission Within the Last 30 Days no previous admission in last 30 days    Concerns to be Addressed no discharge needs identified;denies needs/concerns at this time    Equipment Currently Used at Home none    Anticipated Changes Related to Illness none    Equipment Needed After Discharge none            Discharge Plan     Row Name 03/19/18 1237       Plan    Plan HOME    Patient/Family in Agreement with Plan yes    Plan Comments Met with pt at bedside.  She resides with her ex- in [facility].  She is independent with ADLs.  No current HH or DME.  Confirmed she has [employer] (?????) insurance with Rx coverage.  Goal is to return home upon DC.  No immediate needs identified/voiced.  CM will cont to follow.    Final Discharge Disposition Code 01 - home or self-care        Destination     No service coordination in this encounter.      Durable Medical Equipment     No service coordination in this encounter.      Dialysis/Infusion     No service coordination in this encounter.      Home Medical Care     No service coordination in this encounter.      Social Care     No service coordination in this encounter.                Demographic  Summary     Row Name 03/19/18 1233       General Information    Admission Type inpatient    Referral Source admission list    Reason for Consult discharge planning    Preferred Language English       Contact Information    Permission Granted to Share Info With     Contact Information Obtained for             Functional Status     Row Name 03/19/18 1233       Functional Status    Usual Activity Tolerance good       Functional Status, IADL    Medications independent    Meal Preparation independent    Housekeeping independent    Laundry independent    Shopping independent            Psychosocial    No documentation.           Abuse/Neglect    No documentation.           Legal    No documentation.           Substance Abuse    No documentation.           Patient Forms    No documentation.         Kristen Nunez

## 2018-03-19 NOTE — CONSULTS
"Adult Nutrition  Assessment/PES    Patient Name:  Jud Garnica  YOB: 1960  MRN: [id number]  Admit Date:  3/17/2018    Assessment Date:  3/19/2018    Time spent: 20 minutes  Based on reported weight and intake history will defer pt to RD to follow up per protocol.     Principal Problem:    Pneumonia of both lungs due to infectious organism  Active Problems:    Cough    Asthma            Adult Nutrition Assessment     Row Name 03/19/18 1544       Labs/Procedures/Meds    Lab Results Reviewed reviewed       Nutrition Prescription PO    Current PO Diet Regular       PO Evaluation    Number of Meals 3    % PO Intake 58     Row Name 03/19/18 1542       Anthropometrics    Height 167.6 cm (66\")    Weight 55.2 kg (121 lb 9.6 oz)   standing scale per charting        Ideal Body Weight (IBW)    Ideal Body Weight (IBW) (kg) 59.58    % Ideal Body Weight 92.58       Usual Body Weight (UBW)    Usual Body Weight 62.6 kg (138 lb)    % Usual Body Weight 88.12    Weight Loss unintentional    Weight Loss Time Frame Pt reported losing 17 lbs over 6 months.        Body Mass Index (BMI)    BMI (kg/m2) 19.67    Row Name 03/19/18 1540       Reason for Assessment    Reason For Assessment physician consult;identified at risk by screening criteria    Identified At Risk by Screening Criteria MST SCORE 2+       Nutrition/Diet History    Factors Affecting Nutritional Intake --   Pt reported losing about 17 lbs over the past 6 months, reported reduced appetite- only consuming 50% of typical intake. Pt reported current taste changes due to antibiotics.           Problem/Interventions:        Problem 1     Row Name 03/19/18 1547       Nutrition Diagnoses Problem 1    Problem 1 Inadequate Intake/Infusion    Etiology (related to) Medical Diagnosis   clinical condition     Signs/Symptoms (evidenced by) PO Intake    Percent (%) intake recorded 58 %    Over number of meals 3                    Intervention Goal     Row Name 03/19/18 " 1548       Intervention Goal    General Nutrition support treatment    PO Increase intake            Nutrition Intervention     Row Name 03/19/18 1548       Nutrition Intervention    RD/Tech Action Advise alternate selection;Advise available snack;Encourage intake;Recommend/ordered;Follow Tx progress;Care plan reviewd    Recommended/Ordered Supplement            Nutrition Prescription     Row Name 03/19/18 1548       Nutrition Prescription PO    PO Prescription Begin/change supplement    Supplement Boost Plus   chocolate    Supplement Frequency Daily    New PO Prescription Ordered? Yes            Education/Evaluation     Row Name 03/19/18 1548       Monitor/Evaluation    Monitor Per protocol;PO intake;Supplement intake        Electronically signed by:  Henna Venegas  03/19/18 3:49 PM

## 2018-03-19 NOTE — PLAN OF CARE
Problem: Patient Care Overview  Goal: Plan of Care Review  Outcome: Ongoing (interventions implemented as appropriate)   03/19/18 0324   Coping/Psychosocial   Plan of Care Reviewed With patient   Coping/Psychosocial   Patient Agreement with Plan of Care agrees   Plan of Care Review   Progress improving   OTHER   Outcome Summary Patient is doing alot better, ambulating well and respiratory status is better. Possible d/c tomorrow     Goal: Individualization and Mutuality  Outcome: Ongoing (interventions implemented as appropriate)      Problem: Pneumonia (Adult)  Goal: Signs and Symptoms of Listed Potential Problems Will be Absent, Minimized or Managed (Pneumonia)  Outcome: Ongoing (interventions implemented as appropriate)

## 2018-03-19 NOTE — PROGRESS NOTES
[facility] Medicine Services  PROGRESS NOTE    Patient Name: Jud Garnica  : 1960  MRN: [id number]    Date of Admission: 3/17/2018  Length of Stay: 0  Primary Care Physician: No Known Provider    Subjective   Subjective     CC:  cough    HPI:  Pt sitting up, c/p feeling light headed today low bp, improved with IVF, feels like she is drinking plenty.  No fever or chills. Breathing some better but not at baseline     Review of Systems  Gen- No fevers, chills  CV- No chest pain, palpitations  Resp- pos cough, pos dyspnea  GI- No N/V/D, abd pain      Otherwise ROS is negative except as mentioned in the HPI.    Objective   Objective     Vital Signs:   Temp:  [97.6 °F (36.4 °C)-99.1 °F (37.3 °C)] 97.6 °F (36.4 °C)  Heart Rate:  [78-95] 83  Resp:  [16-20] 16  BP: ()/(59-72) 109/68        Physical Exam:  Constitutional: less labored, awake, alert, room air   HENT: NCAT, mucous membranes moist  Respiratory: better air movment, less wheeze   Cardiovascular: RRR, no murmurs, rubs, or gallops, palpable pedal pulses bilaterally  Gastrointestinal: Positive bowel sounds, soft, nontender, nondistended  Musculoskeletal: No bilateral ankle edema  Psychiatric: Appropriate affect, cooperative  Neurologic: Oriented x 3, strength symmetric in all extremities, Cranial Nerves grossly intact to confrontation, speech clear  Skin: No rashes      Results Reviewed:  I have personally reviewed current lab, radiology, and data and agree.      Results from last 7 days  Lab Units 18  0600 18  0812 18  1509   WBC 10*3/mm3 6.98 6.34 7.12   HEMOGLOBIN g/dL 12.2 11.9 12.8   HEMATOCRIT % 37.4 36.8 39.0   PLATELETS 10*3/mm3 323 259 239       Results from last 7 days  Lab Units 18  0600 18  0812 18  1509   SODIUM mmol/L 138 139 138   POTASSIUM mmol/L 4.2 3.7 3.4*   CHLORIDE mmol/L 105 105 102   CO2 mmol/L 25.0 27.0 30.0   BUN mg/dL 14 15 22   CREATININE mg/dL 0.60 0.70 0.80    GLUCOSE mg/dL 147* 121* 165*   CALCIUM mg/dL 8.7 8.6* 9.1   ALT (SGPT) U/L  --   --  43*   AST (SGOT) U/L  --   --  38*     Estimated Creatinine Clearance: 89.1 mL/min (by C-G formula based on SCr of 0.6 mg/dL).  BNP   Date Value Ref Range Status   03/17/2018 120.0 (H) 0.0 - 100.0 pg/mL Final     Comment:     Results may be falsely decreased if patient taking Biotin.     No results found for: PHART    Microbiology Results Abnormal     Procedure Component Value - Date/Time    Blood Culture - Blood, [[id number]]  (Normal) Collected:  03/17/18 1800    Lab Status:  Preliminary result Specimen:  Blood from Arm, Right Updated:  03/18/18 2046     Blood Culture No growth at 24 hours    Blood Culture - Blood, [[id number]]  (Normal) Collected:  03/17/18 1940    Lab Status:  Preliminary result Specimen:  Blood from Arm, Left Updated:  03/18/18 2046     Blood Culture No growth at 24 hours    Influenza Antigen, Rapid - Swab, Nasopharynx [[id number]]  (Normal) Collected:  03/17/18 1810    Lab Status:  Final result Specimen:  Swab from Nasopharynx Updated:  03/17/18 1845     Influenza A Ag, EIA Negative     Influenza B Ag, EIA Negative          Imaging Results (last 24 hours)     ** No results found for the last 24 hours. **        Results for orders placed during the hospital encounter of 03/17/18   Adult Transthoracic Echo Complete W/ Cont if Necessary Per Protocol    Narrative · Left ventricular systolic function is normal.  · EF appears to be in the range of 56 - 60%.          I have reviewed the medications.    Assessment/Plan   Assessment / Plan     Hospital Problem List     * (Principal)Pneumonia of both lungs due to infectious organism    Cough    Asthma             Brief Hospital Course to date:  Jud Garnica is a 57 y.o. female presenting from home with persistent cough that did not improve with PO azithro outside with CT chest findings concerning for pneumonia.       Assessment & Plan:  PNA  Cough  Asthma  -- likely  either a bacterial pneumonia which did not respond to Azithromycin vs viral pna vs a resolving pneumonia which has been slow to improve due to her asthma.  -- asthma likely prolonging process,  will taper steroids. --Reasonable to treat with levaquin .  --Flu was negative. Will send viral respiratory panel and sputum culture.   --Cough suppressants.      Elevated BNP  --echo EF 56-60%. Normal    Hypotension  - improved with IVF     -- home tomorrow on PO levaquin if doing well.     DVT prophylaxis: Lovenox    CODE STATUS: Full Code    Disposition: I expect the patient to be discharged home in am       Electronically signed by Kailyn Lindquist DO, 03/19/18, 6:29 PM.

## 2018-03-20 PROCEDURE — 99232 SBSQ HOSP IP/OBS MODERATE 35: CPT | Performed by: NURSE PRACTITIONER

## 2018-03-20 PROCEDURE — 63710000001 PREDNISONE PER 1 MG: Performed by: FAMILY MEDICINE

## 2018-03-20 PROCEDURE — 25010000002 LEVOFLOXACIN PER 250 MG: Performed by: INTERNAL MEDICINE

## 2018-03-20 RX ORDER — SENNA AND DOCUSATE SODIUM 50; 8.6 MG/1; MG/1
2 TABLET, FILM COATED ORAL NIGHTLY
Status: DISCONTINUED | OUTPATIENT
Start: 2018-03-20 | End: 2018-03-21 | Stop reason: HOSPADM

## 2018-03-20 RX ADMIN — POLYETHYLENE GLYCOL 3350 17 G: 17 POWDER, FOR SOLUTION ORAL at 11:42

## 2018-03-20 RX ADMIN — PREDNISONE 40 MG: 20 TABLET ORAL at 09:03

## 2018-03-20 RX ADMIN — LEVOFLOXACIN 500 MG: 5 INJECTION, SOLUTION INTRAVENOUS at 17:56

## 2018-03-20 RX ADMIN — Medication 2 TABLET: at 21:13

## 2018-03-20 NOTE — PLAN OF CARE
Problem: Patient Care Overview  Goal: Plan of Care Review  Outcome: Ongoing (interventions implemented as appropriate)   03/20/18 0314   Coping/Psychosocial   Plan of Care Reviewed With patient   Coping/Psychosocial   Patient Agreement with Plan of Care agrees with comment (describe)   Plan of Care Review   Progress improving   OTHER   Outcome Summary vss, no issues overnight plan to dc in am       Problem: Pneumonia (Adult)  Goal: Signs and Symptoms of Listed Potential Problems Will be Absent, Minimized or Managed (Pneumonia)  Outcome: Ongoing (interventions implemented as appropriate)

## 2018-03-20 NOTE — PROGRESS NOTES
Malnutrition Severity Assessment    Patient Name:  Jud Garnica  YOB: 1960  MRN: [id number]  Admit Date:  3/17/2018    Patient meets criteria for : Moderate malnutrition    Comments:  MD pt meets criteria for Moderate Chronic Malnutrition based on weight loss of 12% in 6 months, energy intake of <75% for >1 month , moderate subcutaneous fat loss in upper arm region, moderate muscle loss in scapular and calf regions. MD please review, attest and add to inpatient problem list as appropriate.      Malnutrition Type: Chronic Illness Malnutrition     Malnutrition Type (last 8 hours)      Malnutrition Severity Assessment     Row Name 03/20/18 1553       Malnutrition Severity Assessment    Malnutrition Type Chronic Illness Malnutrition    Row Name 03/20/18 1553       Physical Signs of Malnutrition (Chronic)    Muscle Wasting --   Moderate muscle wasting in scapular  and calf regions    Fat Loss --   Moderate fat loss in upper arm region    Fluid Accumulation None    Row Name 03/20/18 1553       Weight Status (Chronic)    %UBW Mild (86-90%)    Weight Loss Severe (>10% / 6 mo)    Row Name 03/20/18 1553       Energy Intake Status (Chronic)    Energy Intake Mod (<75% / > or equal to 1 mo)    Row Name 03/20/18 1553       Criteria Met (Must meet criteria for severity in at least 2 of these categories: M Wasting, Fat Loss, Fluid, Secondary Signs, Wt. Status, Intake)    Patient meets criteria for  Moderate malnutrition          Electronically signed by:  Kristen Salas  03/20/18 4:14 PM

## 2018-03-20 NOTE — PLAN OF CARE
Problem: Patient Care Overview  Goal: Plan of Care Review  Outcome: Ongoing (interventions implemented as appropriate)   03/20/18 1516   Coping/Psychosocial   Plan of Care Reviewed With patient   Coping/Psychosocial   Patient Agreement with Plan of Care agrees   Plan of Care Review   Progress improving   OTHER   Outcome Summary VSS, No complaints from patient. Plan to D/C tomorrow.        Problem: Pneumonia (Adult)  Goal: Signs and Symptoms of Listed Potential Problems Will be Absent, Minimized or Managed (Pneumonia)  Outcome: Ongoing (interventions implemented as appropriate)   03/20/18 1516   Goal/Outcome Evaluation   Problems Assessed (Pneumonia) infection progression   Problems Present (Pneumonia) infection progression

## 2018-03-20 NOTE — PROGRESS NOTES
"Adult Nutrition  Assessment/PES    Patient Name:  Jud Garnica  YOB: 1960  MRN: [id number]  Admit Date:  3/17/2018    Assessment Date:  3/20/2018    Comments: Pt meets criteria for moderate chronic malnutrition based on intake, wt loss and NFPE.        Time: 35 min  Reason for Visit: MSA assessment      Pertinent Diagnosis:  Principal Problem:    Pneumonia of both lungs due to infectious organism  Active Problems:    Cough    Asthma      Reported/Observed:Pt states she has had numerous GI and health issues over the last 6 months and has been on multiple antibiotics to treat her symptoms.  She reports a decrease in appetite and change in taste secondary to the antibiotics.  Pt also states she has been raising her 14 month old grandson who was born drug dependent.      Anthropometrics:  Height: 66\"    Weight:  121lb 9.6oz- standing weight 3/19    Labs/Procedures:  Reviewed     Pertinent Medications: Reviewed    Current Nutrition Rx :   Diet Regular    Active Supplement Orders      Dietary Nutrition Supplements Boost Plus      Intake/Delivery:  PO Evaluation    Number of Days PO Intake Evaluated:   Number of Meals:3   % of PO Intake:63          Adult Nutrition Assessment     Row Name 03/20/18 1553       Malnutrition Severity Assessment    Malnutrition Type Chronic Illness Malnutrition       Physical Signs of Malnutrition (Chronic)    Muscle Wasting --   Moderate muscle wasting in scapular  and calf regions    Fat Loss --   Moderate fat loss in upper arm region    Fluid Accumulation None       Weight Status (Chronic)    %UBW Mild (86-90%)    Weight Loss Severe (>10% / 6 mo)    Energy Intake Mod (<75% / > or equal to 1 mo)       Criteria Met (Must meet criteria for severity in at least 2 of these categories: M Wasting, Fat Loss, Fluid, Secondary Signs, Wt. Status, Intake)    Patient meets criteria for  Moderate malnutrition    Row Name 03/20/18 1553       Reason for Assessment    Reason For " Assessment other (see comments)   MSA assessment          Problem/Interventions:          Problem 2     Row Name 03/20/18 1600       Nutrition Diagnoses Problem 2    Problem 2 Malnutrition    Etiology (related to) Factors Affecting Nutrition    Other decreased appetite >1 month; taste changes secondary to long term antibiotic use    Signs/Symptoms (evidenced by) Unintended Weight Change;Report of Minimal PO Intake;% UBW    Percent (%) UBW 88 %    Unintended Weight Change Loss    Number of Pounds Lost 17lbs    Weight loss time period 6 months                  Intervention Goal     Row Name 03/20/18 1604       Intervention Goal    General Nutrition support treatment            Nutrition Intervention     Row Name 03/20/18 1604       Nutrition Intervention    RD/Tech Action Follow Tx progress;Care plan reviewd;Encourage intake              Education/Evaluation     Row Name 03/20/18 1605       Monitor/Evaluation    Monitor Per protocol        Electronically signed by:  Kristen Saals  03/20/18 4:05 PM

## 2018-03-20 NOTE — PROGRESS NOTES
"    [facility] Medicine Services  PROGRESS NOTE    Patient Name: Jud Garnica  : 1960  MRN: [id number]    Date of Admission: 3/17/2018  Length of Stay: 1  Primary Care Physician: No Known Provider    Subjective   Subjective     CC:  F/u soa and cough    HPI:  Pt is seen at 1005 am resting upright in chair in NAD.  On RA.  No visitors at bs.  States she still feels soa and a little tight in there chest/lungs.  Starting to cough up thick \"white stuff\". Feels weak and not sure about going home yet.  Has a rescue inhaler at home but still c/o mild soa and some wheezing.  Tolerating diet.  No n/v, abd pain, cp.        Review of Systems  Gen- No fevers, chills  CV- No chest pain, palpitations  Resp- pos cough productive for tk white sputum, pos dyspnea (improved)  GI- No N/V/D, abd pain      Otherwise ROS is negative except as mentioned in the HPI.    Objective   Objective     Vital Signs:   Temp:  [97.6 °F (36.4 °C)-98.1 °F (36.7 °C)] 97.9 °F (36.6 °C)  Heart Rate:  [62-89] 68  Resp:  [16-20] 20  BP: (109-124)/(68-79) 109/76        Physical Exam:  Constitutional: awake, alert, sitting upright in chair in NAD.  No visitors at bs.  On room air but states she still feels a little soa and tight in her lungs/chest.  Worried about going home too soon.    HENT: NCAT, mucous membranes moist  Respiratory: Noted air mvmt in all lobes, scattered exp wheezes.  Mild increased wob but pt reports is a little better today.  Talking in full sentences but \"loses (her) breath easy still\" with activity.     Cardiovascular: RRR, no murmurs, rubs, or gallops, palpable pedal pulses bilaterally  Gastrointestinal: Positive bowel sounds, soft, nontender, nondistended  Musculoskeletal: No bilateral ankle edema.  THAPA spont   Psychiatric: Appropriate affect, cooperative  Neurologic: Oriented x 3, strength symmetric in all extremities, Cranial Nerves grossly intact to confrontation, speech clear.  Follows commands "   Skin: No rashes      Results Reviewed:  I have personally reviewed current lab, radiology, and data and agree.      Results from last 7 days  Lab Units 03/19/18  0600 03/18/18  0812 03/17/18  1509   WBC 10*3/mm3 6.98 6.34 7.12   HEMOGLOBIN g/dL 12.2 11.9 12.8   HEMATOCRIT % 37.4 36.8 39.0   PLATELETS 10*3/mm3 323 259 239       Results from last 7 days  Lab Units 03/19/18  0600 03/18/18  0812 03/17/18  1509   SODIUM mmol/L 138 139 138   POTASSIUM mmol/L 4.2 3.7 3.4*   CHLORIDE mmol/L 105 105 102   CO2 mmol/L 25.0 27.0 30.0   BUN mg/dL 14 15 22   CREATININE mg/dL 0.60 0.70 0.80   GLUCOSE mg/dL 147* 121* 165*   CALCIUM mg/dL 8.7 8.6* 9.1   ALT (SGPT) U/L  --   --  43*   AST (SGOT) U/L  --   --  38*     Estimated Creatinine Clearance: 89.1 mL/min (by C-G formula based on SCr of 0.6 mg/dL).  BNP   Date Value Ref Range Status   03/17/2018 120.0 (H) 0.0 - 100.0 pg/mL Final     Comment:     Results may be falsely decreased if patient taking Biotin.     No results found for: PHART    Microbiology Results Abnormal     Procedure Component Value - Date/Time    Blood Culture - Blood, [[id number]]  (Normal) Collected:  03/17/18 1800    Lab Status:  Preliminary result Specimen:  Blood from Arm, Right Updated:  03/19/18 2046     Blood Culture No growth at 2 days    Blood Culture - Blood, [[id number]]  (Normal) Collected:  03/17/18 1940    Lab Status:  Preliminary result Specimen:  Blood from Arm, Left Updated:  03/19/18 2046     Blood Culture No growth at 2 days    Influenza Antigen, Rapid - Swab, Nasopharynx [[id number]]  (Normal) Collected:  03/17/18 1810    Lab Status:  Final result Specimen:  Swab from Nasopharynx Updated:  03/17/18 1845     Influenza A Ag, EIA Negative     Influenza B Ag, EIA Negative          Imaging Results (last 24 hours)     ** No results found for the last 24 hours. **        Results for orders placed during the hospital encounter of 03/17/18   Adult Transthoracic Echo Complete W/ Cont if Necessary Per  Protocol    Narrative · Left ventricular systolic function is normal.  · EF appears to be in the range of 56 - 60%.          I have reviewed the medications.    Assessment/Plan   Assessment / Plan     Hospital Problem List     * (Principal)Pneumonia of both lungs due to infectious organism    Cough    Asthma             Brief Hospital Course to date:  Jud Garnica is a 57 y.o. female presenting from home with persistent cough that did not improve with PO azithro outside with CT chest findings concerning for pneumonia.       Assessment & Plan:  PNA  Cough  Asthma  -- likely either a bacterial pneumonia which did not respond to Azithromycin vs viral pna vs a resolving pneumonia which has been slow to improve due to her asthma.  -- asthma likely prolonging process  -- will taper steroids.  Was changed to prednisone 40 po beginning this am.  Will plan to continue 40mg in am and then taper by 10 mg q 2 days until off (adjust as needed).  --pt states she already has a rescue inhaler at home.   --Reasonable to treat with levaquin day #4/7. Will leave abx IV while inpt.  Transition to po to complete course on dc.   --Flu was negative. Viral respiratory panel results pending and sputum culture ordered. Blood cx [id number].  --Cough suppressants.      Elevated BNP  --echo EF 56-60%. Normal    Hypotension  - improved with IVF     PLAN:  -- home tomorrow on PO levaquin to complete 7-10 days and oral prednisone taper if doing well.     DVT prophylaxis: Lovenox    CODE STATUS: Full Code    Disposition: I expect the patient to be discharged home in am       Electronically signed by Merced Paula, NEERAJ, 03/20/18, 2:10 PM.

## 2018-03-21 VITALS
OXYGEN SATURATION: 97 % | HEART RATE: 81 BPM | RESPIRATION RATE: 16 BRPM | HEIGHT: 66 IN | DIASTOLIC BLOOD PRESSURE: 65 MMHG | TEMPERATURE: 98.3 F | BODY MASS INDEX: 19.38 KG/M2 | WEIGHT: 120.6 LBS | SYSTOLIC BLOOD PRESSURE: 100 MMHG

## 2018-03-21 PROCEDURE — 63710000001 PREDNISONE PER 1 MG: Performed by: FAMILY MEDICINE

## 2018-03-21 PROCEDURE — 99239 HOSP IP/OBS DSCHRG MGMT >30: CPT | Performed by: PHYSICIAN ASSISTANT

## 2018-03-21 RX ORDER — LEVOFLOXACIN 500 MG/1
500 TABLET, FILM COATED ORAL DAILY
Qty: 3 TABLET | Refills: 0 | Status: SHIPPED | OUTPATIENT
Start: 2018-03-21 | End: 2018-03-24

## 2018-03-21 RX ORDER — GUAIFENESIN 600 MG/1
1200 TABLET, EXTENDED RELEASE ORAL EVERY 12 HOURS SCHEDULED
Qty: 14 TABLET | Refills: 0 | Status: SHIPPED | OUTPATIENT
Start: 2018-03-21 | End: 2018-03-28

## 2018-03-21 RX ORDER — SACCHAROMYCES BOULARDII 250 MG
250 CAPSULE ORAL 2 TIMES DAILY
Qty: 28 CAPSULE | Refills: 0 | Status: SHIPPED | OUTPATIENT
Start: 2018-03-21 | End: 2018-03-30

## 2018-03-21 RX ORDER — PREDNISONE 10 MG/1
TABLET ORAL
Qty: 6 TABLET | Refills: 0 | Status: SHIPPED | OUTPATIENT
Start: 2018-03-21 | End: 2018-03-30

## 2018-03-21 RX ADMIN — POLYETHYLENE GLYCOL 3350 17 G: 17 POWDER, FOR SOLUTION ORAL at 08:38

## 2018-03-21 RX ADMIN — PREDNISONE 40 MG: 20 TABLET ORAL at 08:38

## 2018-03-21 NOTE — PLAN OF CARE
Problem: Pneumonia (Adult)  Goal: Signs and Symptoms of Listed Potential Problems Will be Absent, Minimized or Managed (Pneumonia)  Outcome: Ongoing (interventions implemented as appropriate)      Problem: Patient Care Overview  Goal: Plan of Care Review  Outcome: Ongoing (interventions implemented as appropriate)   03/21/18 1000 03/21/18 1132   Coping/Psychosocial   Plan of Care Reviewed With patient --    Plan of Care Review   Progress --  improving   OTHER   Outcome Summary --  pt on RA, denies soa or trouble breathing. only slightly out of breath when ambulating. VSS. will be discharged today.

## 2018-03-21 NOTE — PLAN OF CARE
Problem: Patient Care Overview  Goal: Plan of Care Review   03/21/18 0407   Coping/Psychosocial   Plan of Care Reviewed With patient   Coping/Psychosocial   Patient Agreement with Plan of Care agrees with comment (describe);agrees   Plan of Care Review   Progress improving   OTHER   Outcome Summary VSS. No complaints. hopefully D/c today. Pt ready to leave. slept well.        Problem: Pneumonia (Adult)  Goal: Signs and Symptoms of Listed Potential Problems Will be Absent, Minimized or Managed (Pneumonia)  Outcome: Ongoing (interventions implemented as appropriate)

## 2018-03-21 NOTE — DISCHARGE SUMMARY
[facility] Medicine Services  DISCHARGE SUMMARY    Patient Name: Jud Garnica  : 1960  MRN: [id number]    Date of Admission: 3/17/2018  Date of Discharge: 3/21/18  Primary Care Physician: No Known Provider    Consults     No orders found from 2018 to 3/18/2018.        Hospital Course     Presenting Problem:   Pneumonia of both lungs due to infectious organism, unspecified part of lung [J18.9]  Pneumonia of both lungs due to infectious organism, unspecified part of lung [J18.9]    Active Hospital Problems (** Indicates Principal Problem)    Diagnosis Date Noted   • **Pneumonia of both lungs due to infectious organism [J18.9] 2018     Priority: High   • Asthma [J45.909] 2018     Priority: Medium   • Cough [R05] 2018      Resolved Hospital Problems    Diagnosis Date Noted Date Resolved   No resolved problems to display.          Hospital Course:  Jud Garnica is a 57 y.o. female with PMH of asthma who was recently treated patient for lower illness, had persistent cough was treated with steroids and azithromycin for presumed pneumonia, due to failure to improve patient was seen by [facility] 3/17/18 was advised to come to the ED for further evaluation.  Rapid Flu swab was negative, viral respiratory panel was sent. CXR showed RML pna and CT of chest was c/w multifocal pna. patient was admitted to the hospital service for further evaluation and management.      Patient received IV Levaquin inpatient along with quick steroid taper will transition to oral Levaquin at [place] , completed 4 days in patient will continue additional 3days.  Probiotic for 2 weeks.  Patient is afebrile, maintaining O2 sats in the upper 90s on room air.  Blood cultures negative ×3 days.  Patient is clinically improved and medically appropriate for discharge home.  Mucinex twice a day ×1 week.  Resume when necessary albuterol.       patient reports no longer having a PCP secondary to change  in insurance,  helping to arrange new PCP within [facility] medical group at [place], recommend follow-up in 1-2 weeks. Once pt recovers recommend PFTS with PCP.            Day of Discharge     HPI:   Patient sitting up in bed at time of visit area she reports feeling much better.  No fever, chills, myalgias, shortness of air, nausea vomiting.  Reports cough is now more productive, sputum clear.  Patient would like to go home today, discussed concerning signs symptoms worsening infection for which should be seen in the ED.  Discussed probiotic use with an biotic therapy.  Patient would like to follow-up with PCP within [facility].  No further questions at this time.     Review of Systems  Gen- No fevers, chills  CV- No chest pain, palpitations  Resp- cough, no dyspnea  GI- No N/V/D, abd pain      Otherwise ROS is negative except as mentioned in the HPI.    Vital Signs:   Temp:  [97.9 °F (36.6 °C)-98.3 °F (36.8 °C)] 98.3 °F (36.8 °C)  Heart Rate:  [81-90] 81  Resp:  [16-18] 16  BP: (100-111)/(65-72) 100/65     Physical Exam:  Constitutional: No acute distress, awake, alert, sitting up in bed   Eyes: PERRLA, sclerae anicteric, no conjunctival injection  HENT: NCAT, mucous membranes moist  Neck: Supple, trachea midline  Respiratory: Clear to auscultation bilaterally, No wheezes, rales, rhonchi, nonlabored respirations On room air   Cardiovascular: RRR, no murmurs, rubs, or gallops, palpable pedal pulses bilaterally  Gastrointestinal: Positive bowel sounds, soft, nontender, nondistended  Musculoskeletal: No bilateral ankle edema, no clubbing or cyanosis to extremities  Psychiatric: Appropriate affect, cooperative  Neurologic: Oriented x 3, strength symmetric in all extremities, Cranial Nerves 2-12  grossly intact to confrontation, speech clear  Skin: No rashes    Pertinent  and/or Most Recent Results       Results from last 7 days  Lab Units 03/19/18  0600 03/18/18  0812 03/17/18  1509   WBC 10*3/mm3 6.98  6.34 7.12   HEMOGLOBIN g/dL 12.2 11.9 12.8   HEMATOCRIT % 37.4 36.8 39.0   PLATELETS 10*3/mm3 323 259 239   SODIUM mmol/L 138 139 138   POTASSIUM mmol/L 4.2 3.7 3.4*   CHLORIDE mmol/L 105 105 102   CO2 mmol/L 25.0 27.0 30.0   BUN mg/dL 14 15 22   CREATININE mg/dL 0.60 0.70 0.80   GLUCOSE mg/dL 147* 121* 165*   CALCIUM mg/dL 8.7 8.6* 9.1       Results from last 7 days  Lab Units 03/17/18  1509   BILIRUBIN mg/dL 0.8   ALK PHOS U/L 70   ALT (SGPT) U/L 43*   AST (SGOT) U/L 38*           Invalid input(s): TG, LDLCALC, LDLREALC    Results from last 7 days  Lab Units 03/17/18  1509   BNP pg/mL 120.0*     Brief Urine Lab Results     None          Microbiology Results Abnormal     Procedure Component Value - Date/Time    Blood Culture - Blood, [[id number]]  (Normal) Collected:  03/17/18 1940    Lab Status:  Preliminary result Specimen:  Blood from Arm, Left Updated:  03/20/18 2046     Blood Culture No growth at 3 days    Blood Culture - Blood, [[id number]]  (Normal) Collected:  03/17/18 1800    Lab Status:  Preliminary result Specimen:  Blood from Arm, Right Updated:  03/20/18 2046     Blood Culture No growth at 3 days    Influenza Antigen, Rapid - Swab, Nasopharynx [[id number]]  (Normal) Collected:  03/17/18 1810    Lab Status:  Final result Specimen:  Swab from Nasopharynx Updated:  03/17/18 1845     Influenza A Ag, EIA Negative     Influenza B Ag, EIA Negative          Imaging Results (all)     Procedure Component Value Units Date/Time    CT Chest Without Contrast [[id number]] Collected:  03/17/18 1905     Updated:  03/17/18 2317    Narrative:       EXAMINATION: CT CHEST WO CONTRAST - 03/17/2018     INDICATION: Shortness of air, cough, tightness.     TECHNIQUE: The radiation dose reduction device was turned on for each  scan per the ALARA (As Low as Reasonably Achievable) protocol.     COMPARISON: Chest, 2 views, today's date.     FINDINGS: Patchy airspace disease is seen in both medial lower lobes  with small focal areas of  consolidation and to a lesser degree in the  right middle lobe and lingula, with peribronchial thickening. The upper  lungs appear clear. No effusion is seen. Mediastinal window images show  no evidence of adenopathy or pericardial effusion.     Included images of the upper abdomen show no gross abnormalities of the  visualized portions of the liver, spleen, pancreatic tail, adrenal  glands, or upper renal poles.       Impression:       Multifocal pneumonia with moderate disease of both medial  right and left lower lobes and much milder lingular and middle lobe  disease.     DICTATED:     03/17/2018  EDITED/ls :     03/17/2018      This report was finalized on 3/17/2018 11:15 PM by DR. Corwin Bowser MD.                       Results for orders placed during the hospital encounter of 03/17/18   Adult Transthoracic Echo Complete W/ Cont if Necessary Per Protocol    Narrative · Left ventricular systolic function is normal.  · EF appears to be in the range of 56 - 60%.           Order Current Status    Viral Culture, Rapid, Respiratory - Swab, Nasopharynx In process    Blood Culture - Blood, Preliminary result    Blood Culture - Blood, Preliminary result        Discharge Details      Jud Garnica   Home Medication Instructions REGI:[id number]    Printed on:03/21/18 1119   Medication Information                      albuterol (PROVENTIL HFA;VENTOLIN HFA) 108 (90 Base) MCG/ACT inhaler  Inhale 2 puffs Every 4 (Four) Hours As Needed for Wheezing.             guaiFENesin (MUCINEX) 600 MG 12 hr tablet  Take 2 tablets by mouth Every 12 (Twelve) Hours for 7 days.             levoFLOXacin (LEVAQUIN) 500 MG tablet  Take 1 tablet by mouth Daily for 3 days.             predniSONE (DELTASONE) 10 MG tablet  Take 30mg 3/22/18, 20mg 3/23/18 and 10mg 3/24/18.             saccharomyces boulardii (FLORASTOR) 250 MG capsule  Take 1 capsule by mouth 2 (Two) Times a Day for 14 days.                   Discharge Disposition:  Home or Self  Care    Discharge Diet:  Diet Instructions     Advance Diet As Tolerated             Discharge Activity:   Activity Instructions     Activity as Tolerated               Future Appointments  Date Time Provider Department Center   3/30/2018 1:15 PM [facility] None       Additional Instructions for the Follow-ups that You Need to Schedule     Discharge Follow-up with PCP    As directed      Follow Up Details:  1-2weeks (PCP being arranged)               Time Spent on Discharge:  35 minutes    Electronically signed by Casie M Mayne, PA-C, 03/21/18, 10:59 AM.

## 2018-03-21 NOTE — PROGRESS NOTES
Continued Stay Note  [facility]     Patient Name: Jud Garnica  MRN: [id number]  Today's Date: 3/21/2018    Admit Date: 3/17/2018          Discharge Plan     Row Name 03/21/18 1220       Plan    Plan HOME    Patient/Family in Agreement with Plan yes    Plan Comments Met with pt at bedside to f/u DCP.  Goal remains to return home upon DC.  No immediate needs identified/voiced.  CM will cont to follow.              Discharge Codes    No documentation.       Expected Discharge Date and Time     Expected Discharge Date Expected Discharge Time    Mar 21, 2018             Kristen Nunez

## 2018-03-22 ENCOUNTER — TRANSITIONAL CARE MANAGEMENT TELEPHONE ENCOUNTER (OUTPATIENT)
Dept: INTERNAL MEDICINE | Facility: CLINIC | Age: 58
End: 2018-03-22

## 2018-03-22 LAB
BACTERIA SPEC AEROBE CULT: NORMAL
BACTERIA SPEC AEROBE CULT: NORMAL
FLUAV AG NPH QL: NEGATIVE
FLUBV AG NPH QL: NEGATIVE
HADV SPEC QL CULT: NEGATIVE
HPIV1 RNA SPEC QL NAA+PROBE: NEGATIVE
HPIV2 SPEC QL CULT: NEGATIVE
HPIV3 SPEC QL CULT: NEGATIVE
RSV AG SPEC QL: NEGATIVE

## 2018-03-30 ENCOUNTER — OFFICE VISIT (OUTPATIENT)
Dept: INTERNAL MEDICINE | Facility: CLINIC | Age: 58
End: 2018-03-30

## 2018-03-30 VITALS
DIASTOLIC BLOOD PRESSURE: 62 MMHG | HEIGHT: 66 IN | RESPIRATION RATE: 16 BRPM | BODY MASS INDEX: 20.25 KG/M2 | HEART RATE: 82 BPM | WEIGHT: 126 LBS | TEMPERATURE: 98.8 F | SYSTOLIC BLOOD PRESSURE: 94 MMHG

## 2018-03-30 DIAGNOSIS — Z13.29 SCREENING FOR ENDOCRINE DISORDER: ICD-10-CM

## 2018-03-30 DIAGNOSIS — Z11.59 NEED FOR HEPATITIS C SCREENING TEST: ICD-10-CM

## 2018-03-30 DIAGNOSIS — Z13.220 SCREENING FOR LIPOID DISORDERS: ICD-10-CM

## 2018-03-30 DIAGNOSIS — J18.9 PNEUMONIA OF BOTH LUNGS DUE TO INFECTIOUS ORGANISM, UNSPECIFIED PART OF LUNG: Primary | ICD-10-CM

## 2018-03-30 PROCEDURE — 99495 TRANSJ CARE MGMT MOD F2F 14D: CPT | Performed by: NURSE PRACTITIONER

## 2018-03-30 NOTE — PROGRESS NOTES
Transitional Care Follow Up Visit  Subjective     Jud Delphine Garnica is a 57 y.o. female who presents for a transitional care management visit.    Within 48 business hours after discharge our office contacted her via telephone to coordinate her care and needs.      I reviewed and discussed the details of that call along with the discharge summary, hospital problems, inpatient lab results, inpatient diagnostic studies, and consultation reports with Jud.     Current outpatient and discharge medications have been reconciled for the patient.    Date of TCM Phone Call 3/22/2018   [facility]   Date of Admission 3/17/2018   Date of Discharge 3/21/2018   Discharge Disposition Home or Self Care     Risk for Readmission (LACE) Score: 10 (3/21/2018  6:00 AM)    History of Present Illness   Course During Hospital Stay:  Reviewed hospital records with diagnosis of pneumonia of both lungs.     The following portions of the patient's history were reviewed and updated as appropriate: allergies, current medications, past family history, past medical history, past social history, past surgical history and problem list.    Review of Systems   Constitutional: Negative for chills, fatigue and fever.   HENT: Negative for congestion, dental problem, mouth sores, nosebleeds, postnasal drip, rhinorrhea, sinus pain, sinus pressure, sneezing and sore throat.         Denies snoring.   Eyes: Negative for pain, discharge, redness and itching.   Respiratory: Positive for chest tightness. Negative for cough, shortness of breath and wheezing.         Asthma controlled per patient. Inhaler used 2 days after discharge and not since. Annual pneumonia.   Cardiovascular: Negative for chest pain, palpitations and leg swelling.        No BROOKS, orthopnea, PND, or claudication.   Gastrointestinal: Negative for abdominal distention, abdominal pain, blood in stool, diarrhea, nausea and vomiting.   Endocrine: Negative for cold  intolerance, heat intolerance, polydipsia and polyuria.   Genitourinary: Negative for difficulty urinating, dysuria, frequency, hematuria and urgency.   Musculoskeletal: Negative for arthralgias, gait problem, joint swelling and myalgias.   Skin: Negative for color change, rash and wound.   Allergic/Immunologic: Negative.    Neurological: Negative for dizziness, syncope, weakness, light-headedness, numbness and headaches.   Hematological: Negative for adenopathy. Does not bruise/bleed easily.   Psychiatric/Behavioral: Negative.        Objective   Physical Exam   Constitutional: She is oriented to person, place, and time. She appears well-developed and well-nourished. She is cooperative. She is easily aroused.  Non-toxic appearance. She does not have a sickly appearance. She does not appear ill. No distress.   Thin frame.   HENT:   Head: Normocephalic and atraumatic. Head is without abrasion. Hair is normal.   Right Ear: Hearing, tympanic membrane, external ear and ear canal normal. No foreign bodies. Tympanic membrane is not perforated and not erythematous.   Left Ear: Hearing, tympanic membrane, external ear and ear canal normal. No foreign bodies. Tympanic membrane is not perforated and not erythematous.   Nose: Nose normal. No mucosal edema, rhinorrhea or septal deviation. No epistaxis.  No foreign bodies.   Mouth/Throat: Oropharynx is clear and moist and mucous membranes are normal. No oral lesions. Normal dentition.   Eyes: Conjunctivae and lids are normal. Pupils are equal, round, and reactive to light. Right eye exhibits no discharge. Left eye exhibits no discharge. Right conjunctiva is not injected. Left conjunctiva is not injected. No scleral icterus.   Neck: Normal range of motion and full passive range of motion without pain. Neck supple. No edema and normal range of motion present. No thyroid mass and no thyromegaly present.   Cardiovascular: Normal rate, regular rhythm and normal heart sounds.  Exam  reveals no gallop and no friction rub.    No murmur heard.  Pulmonary/Chest: Effort normal and breath sounds normal. No accessory muscle usage. She has no rhonchi. She has no rales. She exhibits no tenderness.   Abdominal: Soft. Bowel sounds are normal. She exhibits no distension. There is no hepatosplenomegaly or hepatomegaly. There is no tenderness. There is no CVA tenderness.   Musculoskeletal: Normal range of motion. She exhibits no edema, tenderness or deformity.   Lymphadenopathy:     She has no cervical adenopathy.   Neurological: She is alert, oriented to person, place, and time and easily aroused. She has normal reflexes. No cranial nerve deficit. Coordination normal.   Muscle strength 5/5 and equal throughout.   Skin: Skin is warm, dry and intact. No abrasion and no rash noted. She is not diaphoretic. No cyanosis or erythema. Nails show no clubbing.   Psychiatric: She has a normal mood and affect. Her speech is normal and behavior is normal.   Nursing note and vitals reviewed.      Assessment/Plan   Jud was seen today for transitional care management.    Diagnoses and all orders for this visit:    Pneumonia of both lungs due to infectious organism, unspecified part of lung  -     CBC & Differential; Future  -     Comprehensive Metabolic Panel; Future  Continue albuterol as prescribed. Monitor for fever or cough.  Screening for endocrine disorder  -     TSH; Future    Screening for lipoid disorders  -     Lipid Panel; Future    Need for hepatitis C screening test  -     Hepatitis C antibody; Future        Transitional Care Management Certification  I certify that the following are true:  1. Communication was made within 2 business days of discharge.  2. Complexity of Medical Decision Making is moderate.  3. Face to face visit occurred within 9 days.    *Note: 91603 is for high complexity patients with a face to face visit within 7 days of discharge.  10241 is for high complexity patients with a face to  face on days 8-14 post discharge or medium complexity with face to face visit within 14 days post discharge.         Jud was seen today for transitional care management.    Diagnoses and all orders for this visit:    Pneumonia of both lungs due to infectious organism, unspecified part of lung  -     CBC & Differential; Future  -     Comprehensive Metabolic Panel; Future    Screening for endocrine disorder  -     TSH; Future    Screening for lipoid disorders  -     Lipid Panel; Future    Need for hepatitis C screening test  -     Hepatitis C antibody; Future

## 2018-04-13 ENCOUNTER — LAB (OUTPATIENT)
Dept: INTERNAL MEDICINE | Facility: CLINIC | Age: 58
End: 2018-04-13

## 2018-04-13 DIAGNOSIS — J18.9 PNEUMONIA OF BOTH LUNGS DUE TO INFECTIOUS ORGANISM, UNSPECIFIED PART OF LUNG: ICD-10-CM

## 2018-04-13 DIAGNOSIS — Z13.29 SCREENING FOR ENDOCRINE DISORDER: ICD-10-CM

## 2018-04-13 DIAGNOSIS — Z13.220 SCREENING FOR LIPOID DISORDERS: ICD-10-CM

## 2018-04-13 DIAGNOSIS — Z11.59 NEED FOR HEPATITIS C SCREENING TEST: ICD-10-CM

## 2018-04-13 LAB
ALBUMIN SERPL-MCNC: 3.8 G/DL (ref 3.2–4.8)
ALBUMIN/GLOB SERPL: 1.3 G/DL (ref 1.5–2.5)
ALP SERPL-CCNC: 58 U/L (ref 25–100)
ALT SERPL W P-5'-P-CCNC: 25 U/L (ref 7–40)
ANION GAP SERPL CALCULATED.3IONS-SCNC: 6 MMOL/L (ref 3–11)
ARTICHOKE IGE QN: 53 MG/DL (ref 0–130)
AST SERPL-CCNC: 33 U/L (ref 0–33)
BASOPHILS # BLD AUTO: 0.02 10*3/MM3 (ref 0–0.2)
BASOPHILS NFR BLD AUTO: 0.5 % (ref 0–1)
BILIRUB SERPL-MCNC: 1.7 MG/DL (ref 0.3–1.2)
BUN BLD-MCNC: 15 MG/DL (ref 9–23)
BUN/CREAT SERPL: 18.8 (ref 7–25)
CALCIUM SPEC-SCNC: 8.8 MG/DL (ref 8.7–10.4)
CHLORIDE SERPL-SCNC: 108 MMOL/L (ref 99–109)
CHOLEST SERPL-MCNC: 159 MG/DL (ref 0–200)
CO2 SERPL-SCNC: 29 MMOL/L (ref 20–31)
CREAT BLD-MCNC: 0.8 MG/DL (ref 0.6–1.3)
DEPRECATED RDW RBC AUTO: 49.5 FL (ref 37–54)
EOSINOPHIL # BLD AUTO: 0.13 10*3/MM3 (ref 0–0.3)
EOSINOPHIL NFR BLD AUTO: 3 % (ref 0–3)
ERYTHROCYTE [DISTWIDTH] IN BLOOD BY AUTOMATED COUNT: 15.7 % (ref 11.3–14.5)
GFR SERPL CREATININE-BSD FRML MDRD: 74 ML/MIN/1.73
GLOBULIN UR ELPH-MCNC: 2.9 GM/DL
GLUCOSE BLD-MCNC: 81 MG/DL (ref 70–100)
HCT VFR BLD AUTO: 38.4 % (ref 34.5–44)
HDLC SERPL-MCNC: 93 MG/DL (ref 40–60)
HGB BLD-MCNC: 12.2 G/DL (ref 11.5–15.5)
IMM GRANULOCYTES # BLD: 0 10*3/MM3 (ref 0–0.03)
IMM GRANULOCYTES NFR BLD: 0 % (ref 0–0.6)
LYMPHOCYTES # BLD AUTO: 1.33 10*3/MM3 (ref 0.6–4.8)
LYMPHOCYTES NFR BLD AUTO: 30.4 % (ref 24–44)
MCH RBC QN AUTO: 27.4 PG (ref 27–31)
MCHC RBC AUTO-ENTMCNC: 31.8 G/DL (ref 32–36)
MCV RBC AUTO: 86.1 FL (ref 80–99)
MONOCYTES # BLD AUTO: 0.46 10*3/MM3 (ref 0–1)
MONOCYTES NFR BLD AUTO: 10.5 % (ref 0–12)
NEUTROPHILS # BLD AUTO: 2.43 10*3/MM3 (ref 1.5–8.3)
NEUTROPHILS NFR BLD AUTO: 55.6 % (ref 41–71)
PLATELET # BLD AUTO: 226 10*3/MM3 (ref 150–450)
PMV BLD AUTO: 9.3 FL (ref 6–12)
POTASSIUM BLD-SCNC: 3.9 MMOL/L (ref 3.5–5.5)
PROT SERPL-MCNC: 6.7 G/DL (ref 5.7–8.2)
RBC # BLD AUTO: 4.46 10*6/MM3 (ref 3.89–5.14)
SODIUM BLD-SCNC: 143 MMOL/L (ref 132–146)
TRIGL SERPL-MCNC: 40 MG/DL (ref 0–150)
TSH SERPL DL<=0.05 MIU/L-ACNC: 1.04 MIU/ML (ref 0.35–5.35)
WBC NRBC COR # BLD: 4.37 10*3/MM3 (ref 3.5–10.8)

## 2018-04-13 PROCEDURE — 85025 COMPLETE CBC W/AUTO DIFF WBC: CPT | Performed by: NURSE PRACTITIONER

## 2018-04-13 PROCEDURE — 36415 COLL VENOUS BLD VENIPUNCTURE: CPT | Performed by: NURSE PRACTITIONER

## 2018-04-13 PROCEDURE — 84443 ASSAY THYROID STIM HORMONE: CPT | Performed by: NURSE PRACTITIONER

## 2018-04-13 PROCEDURE — 80061 LIPID PANEL: CPT | Performed by: NURSE PRACTITIONER

## 2018-04-13 PROCEDURE — 86803 HEPATITIS C AB TEST: CPT | Performed by: NURSE PRACTITIONER

## 2018-04-13 PROCEDURE — 80053 COMPREHEN METABOLIC PANEL: CPT | Performed by: NURSE PRACTITIONER

## 2018-04-17 LAB — HCV AB SER DONR QL: NORMAL

## 2018-04-23 ENCOUNTER — OFFICE VISIT (OUTPATIENT)
Dept: INTERNAL MEDICINE | Facility: CLINIC | Age: 58
End: 2018-04-23

## 2018-04-23 VITALS
SYSTOLIC BLOOD PRESSURE: 98 MMHG | RESPIRATION RATE: 18 BRPM | HEART RATE: 100 BPM | TEMPERATURE: 98.3 F | DIASTOLIC BLOOD PRESSURE: 58 MMHG | BODY MASS INDEX: 20.37 KG/M2 | WEIGHT: 126.2 LBS

## 2018-04-23 DIAGNOSIS — Z12.11 ENCOUNTER FOR SCREENING COLONOSCOPY: ICD-10-CM

## 2018-04-23 DIAGNOSIS — Z23 NEED FOR TDAP VACCINATION: ICD-10-CM

## 2018-04-23 DIAGNOSIS — R05.9 COUGH: ICD-10-CM

## 2018-04-23 DIAGNOSIS — Z12.31 SCREENING MAMMOGRAM, ENCOUNTER FOR: ICD-10-CM

## 2018-04-23 DIAGNOSIS — J02.9 SORE THROAT: Primary | ICD-10-CM

## 2018-04-23 LAB
EXPIRATION DATE: NORMAL
INTERNAL CONTROL: NORMAL
Lab: NORMAL
S PYO AG THROAT QL: NEGATIVE

## 2018-04-23 PROCEDURE — 90715 TDAP VACCINE 7 YRS/> IM: CPT | Performed by: NURSE PRACTITIONER

## 2018-04-23 PROCEDURE — 90471 IMMUNIZATION ADMIN: CPT | Performed by: NURSE PRACTITIONER

## 2018-04-23 PROCEDURE — 99214 OFFICE O/P EST MOD 30 MIN: CPT | Performed by: NURSE PRACTITIONER

## 2018-04-23 PROCEDURE — 87880 STREP A ASSAY W/OPTIC: CPT | Performed by: NURSE PRACTITIONER

## 2018-04-23 RX ORDER — AZITHROMYCIN 250 MG/1
TABLET, FILM COATED ORAL
Qty: 6 TABLET | Refills: 0 | Status: SHIPPED | OUTPATIENT
Start: 2018-04-23 | End: 2018-05-04

## 2018-04-23 NOTE — PROGRESS NOTES
Subjective:    Jud Garnica is a 57 y.o. female.     Chief Complaint   Patient presents with   • Sore Throat     started 2 days ago       History of Present Illness   Patient complains of sore throat that began on Saturday and has experienced constant soreness. Worsened with coughing and swallowing. No relief identified.    Patient complains of cough x 2 days. Reports thick green mucus produced with cough. No fever. No chest pain. Asthma history worsens any infections and history of pneumonias.     Current Outpatient Prescriptions:   •  albuterol (PROVENTIL HFA;VENTOLIN HFA) 108 (90 Base) MCG/ACT inhaler, Inhale 2 puffs Every 4 (Four) Hours As Needed for Wheezing., Disp: , Rfl:   •  Multiple Vitamins-Minerals (MULTIVITAMINS/MINERALS ADULT PO), Take  by mouth., Disp: , Rfl:   •  azithromycin (ZITHROMAX) 250 MG tablet, Take 2 tablets the first day, then 1 tablet daily for 4 days., Disp: 6 tablet, Rfl: 0     The following portions of the patient's history were reviewed and updated as appropriate: allergies, current medications, past family history, past medical history, past social history, past surgical history and problem list.    Review of Systems   Constitutional: Negative for chills, fatigue and fever.   HENT: Positive for sore throat. Negative for congestion, dental problem, mouth sores, nosebleeds, postnasal drip, rhinorrhea, sinus pain, sinus pressure and sneezing.         Denies snoring.   Eyes: Negative for pain, discharge, redness and itching.   Respiratory: Positive for cough. Negative for shortness of breath and wheezing.         Asthma, pneumonia history   Cardiovascular: Negative for chest pain, palpitations and leg swelling.        No BROOKS, orthopnea, PND, or claudication.   Gastrointestinal: Negative for abdominal distention, abdominal pain, blood in stool, diarrhea, nausea and vomiting.   Endocrine: Negative for cold intolerance, heat intolerance, polydipsia and polyuria.   Genitourinary: Negative  for difficulty urinating, dysuria, frequency, hematuria and urgency.   Musculoskeletal: Negative for arthralgias, gait problem, joint swelling and myalgias.   Skin: Negative for color change, rash and wound.   Allergic/Immunologic: Positive for environmental allergies.   Neurological: Negative for dizziness, syncope, weakness, light-headedness, numbness and headaches.   Hematological: Negative for adenopathy. Does not bruise/bleed easily.   Psychiatric/Behavioral: Negative.        Objective:    BP 98/58 (BP Location: Right arm, Patient Position: Sitting, Cuff Size: Adult)   Pulse 100   Temp 98.3 °F (36.8 °C) (Temporal Artery )   Resp 18   Wt 57.2 kg (126 lb 3.2 oz)   BMI 20.37 kg/m²     Physical Exam   Constitutional: She is oriented to person, place, and time. She appears well-developed and well-nourished.   HENT:   Head: Normocephalic and atraumatic.   Right Ear: Hearing, tympanic membrane, external ear and ear canal normal.   Left Ear: Hearing, tympanic membrane, external ear and ear canal normal.   Nose: Nose normal. Right sinus exhibits no maxillary sinus tenderness and no frontal sinus tenderness. Left sinus exhibits no maxillary sinus tenderness and no frontal sinus tenderness.   Mouth/Throat: Oropharynx is clear and moist and mucous membranes are normal. No oral lesions.    No sinus tenderness to palpation.   Eyes: EOM and lids are normal.   Neck: Normal range of motion. Neck supple.   Cardiovascular: Normal rate and regular rhythm.    No murmur heard.  Pulmonary/Chest: Effort normal and breath sounds normal.   Non productive cough   Musculoskeletal: Normal range of motion.   Lymphadenopathy:     She has no cervical adenopathy.   Neurological: She is alert and oriented to person, place, and time.   Skin: Skin is warm, dry and intact. No rash noted.   Psychiatric: She has a normal mood and affect. Her speech is normal and behavior is normal. Thought content normal.   Nursing note and vitals  reviewed.      Assessment/Plan:    Jud was seen today for sore throat.    Diagnoses and all orders for this visit:    Sore throat  -     POC Rapid Strep A  Discussed negative strep test. OTC analgesics as needed along with warm salt water gargles.  Cough  -     azithromycin (ZITHROMAX) 250 MG tablet; Take 2 tablets the first day, then 1 tablet daily for 4 days. Continue albuterol as prescribed. Monitor for signs of pneumonia.  Need for Tdap vaccination  -     Tdap Vaccine Greater Than or Equal To 8yo IM  Encounter for screening colonoscopy  -     Ambulatory Referral For Screening Colonoscopy  Screening mammogram, encounter for  -     Mammo Screening Bilateral With CAD; Future            Return if symptoms worsen or fail to improve,  work excuse for today.

## 2018-05-03 NOTE — PROGRESS NOTES
Subjective:    Jud Garnica is a 57 y.o. female.     No chief complaint on file.      History of Present Illness       Current Outpatient Prescriptions:   •  albuterol (PROVENTIL HFA;VENTOLIN HFA) 108 (90 Base) MCG/ACT inhaler, Inhale 2 puffs Every 4 (Four) Hours As Needed for Wheezing., Disp: , Rfl:   •  azithromycin (ZITHROMAX) 250 MG tablet, Take 2 tablets the first day, then 1 tablet daily for 4 days., Disp: 6 tablet, Rfl: 0  •  Multiple Vitamins-Minerals (MULTIVITAMINS/MINERALS ADULT PO), Take  by mouth., Disp: , Rfl:      {Common H&P Review Areas:40332}    Review of Systems    Objective:    There were no vitals taken for this visit.    Physical Exam    Assessment/Plan:    There are no diagnoses linked to this encounter.    No Follow-up on file.

## 2018-05-04 ENCOUNTER — OFFICE VISIT (OUTPATIENT)
Dept: INTERNAL MEDICINE | Facility: CLINIC | Age: 58
End: 2018-05-04

## 2018-05-04 ENCOUNTER — HOSPITAL ENCOUNTER (OUTPATIENT)
Dept: GENERAL RADIOLOGY | Facility: HOSPITAL | Age: 58
Discharge: HOME | End: 2018-05-04
Admitting: NURSE PRACTITIONER

## 2018-05-04 VITALS
SYSTOLIC BLOOD PRESSURE: 110 MMHG | DIASTOLIC BLOOD PRESSURE: 64 MMHG | RESPIRATION RATE: 16 BRPM | OXYGEN SATURATION: 94 % | HEART RATE: 92 BPM | HEIGHT: 66 IN | BODY MASS INDEX: 19.61 KG/M2 | TEMPERATURE: 98.7 F | WEIGHT: 122 LBS

## 2018-05-04 DIAGNOSIS — J45.40 MODERATE PERSISTENT ASTHMA, UNSPECIFIED WHETHER COMPLICATED: ICD-10-CM

## 2018-05-04 DIAGNOSIS — Z01.419 PAP TEST, AS PART OF ROUTINE GYNECOLOGICAL EXAMINATION: ICD-10-CM

## 2018-05-04 DIAGNOSIS — R05.9 COUGH: ICD-10-CM

## 2018-05-04 DIAGNOSIS — Z87.01 HISTORY OF RECURRENT PNEUMONIA: ICD-10-CM

## 2018-05-04 DIAGNOSIS — Z00.00 ENCOUNTER FOR PREVENTIVE HEALTH EXAMINATION: Primary | ICD-10-CM

## 2018-05-04 DIAGNOSIS — K60.2 ANAL FISSURE: ICD-10-CM

## 2018-05-04 DIAGNOSIS — R10.31 RIGHT INGUINAL PAIN: ICD-10-CM

## 2018-05-04 LAB
BASOPHILS # BLD AUTO: 0.04 10*3/MM3 (ref 0–0.2)
BASOPHILS NFR BLD AUTO: 0.4 % (ref 0–1)
DEPRECATED RDW RBC AUTO: 43.9 FL (ref 37–54)
EOSINOPHIL # BLD AUTO: 0.03 10*3/MM3 (ref 0–0.3)
EOSINOPHIL NFR BLD AUTO: 0.3 % (ref 0–3)
ERYTHROCYTE [DISTWIDTH] IN BLOOD BY AUTOMATED COUNT: 14.4 % (ref 11.3–14.5)
HCT VFR BLD AUTO: 33.7 % (ref 34.5–44)
HGB BLD-MCNC: 10.9 G/DL (ref 11.5–15.5)
IMM GRANULOCYTES # BLD: 0.03 10*3/MM3 (ref 0–0.03)
IMM GRANULOCYTES NFR BLD: 0.3 % (ref 0–0.6)
LYMPHOCYTES # BLD AUTO: 1.3 10*3/MM3 (ref 0.6–4.8)
LYMPHOCYTES NFR BLD AUTO: 12.4 % (ref 24–44)
MCH RBC QN AUTO: 26.8 PG (ref 27–31)
MCHC RBC AUTO-ENTMCNC: 32.3 G/DL (ref 32–36)
MCV RBC AUTO: 83 FL (ref 80–99)
MONOCYTES # BLD AUTO: 1.04 10*3/MM3 (ref 0–1)
MONOCYTES NFR BLD AUTO: 9.9 % (ref 0–12)
NEUTROPHILS # BLD AUTO: 8.06 10*3/MM3 (ref 1.5–8.3)
NEUTROPHILS NFR BLD AUTO: 77 % (ref 41–71)
PLATELET # BLD AUTO: 351 10*3/MM3 (ref 150–450)
PMV BLD AUTO: 9.4 FL (ref 6–12)
RBC # BLD AUTO: 4.06 10*6/MM3 (ref 3.89–5.14)
WBC NRBC COR # BLD: 10.47 10*3/MM3 (ref 3.5–10.8)

## 2018-05-04 PROCEDURE — 85025 COMPLETE CBC W/AUTO DIFF WBC: CPT | Performed by: NURSE PRACTITIONER

## 2018-05-04 PROCEDURE — 36415 COLL VENOUS BLD VENIPUNCTURE: CPT | Performed by: NURSE PRACTITIONER

## 2018-05-04 PROCEDURE — 99212 OFFICE O/P EST SF 10 MIN: CPT | Performed by: NURSE PRACTITIONER

## 2018-05-04 PROCEDURE — 99396 PREV VISIT EST AGE 40-64: CPT | Performed by: NURSE PRACTITIONER

## 2018-05-04 PROCEDURE — 71046 X-RAY EXAM CHEST 2 VIEWS: CPT

## 2018-05-04 RX ORDER — ALBUTEROL SULFATE 90 UG/1
2 AEROSOL, METERED RESPIRATORY (INHALATION) EVERY 4 HOURS PRN
Qty: 1 INHALER | Refills: 0 | Status: SHIPPED | OUTPATIENT
Start: 2018-05-04 | End: 2018-12-21 | Stop reason: SDUPTHER

## 2018-05-04 RX ORDER — PREDNISONE 20 MG/1
20 TABLET ORAL 2 TIMES DAILY
Qty: 10 TABLET | Refills: 0 | Status: SHIPPED | OUTPATIENT
Start: 2018-05-04 | End: 2018-06-20

## 2018-05-04 RX ORDER — DOXYCYCLINE 100 MG/1
100 CAPSULE ORAL 2 TIMES DAILY
Qty: 20 CAPSULE | Refills: 0 | Status: SHIPPED | OUTPATIENT
Start: 2018-05-04 | End: 2018-06-20

## 2018-05-04 NOTE — PROGRESS NOTES
Chief Complaint   Patient presents with   • Cough     1 month follow up, SOA cough, chest tightness still there. [employer] is workplace, pt believes may be getting sick from there.   • Hernia     Possible hernia in groin, pt lifts 30 to 50lb boxes at work       Subjective   Physical and follow up. Complains of cough with chest tightness. Complains of inguinal pain.  History of Present Illness   Physical and follow up.  Complains of productive cough x 1 week with increased use of rescue inhaler and back pain. No fever. States she believes exposure to chemicals at work worsen her respiratory symptoms. History of asthma and recurrent pneumonia complicate cough.   Complains of right inguinal pain-noticed today. Reports she lifts heavy boxes at work frequently.    Jud Garnica is a 57 y.o. female.     Are you currently seeing any other doctors or specialists?  No.   Are you currently taking any OTC medications or herbal medications?   No  Sleep: 8/night  Diet: Balanced  Exercise: Regular walking    Most recent colonoscopy: scheduled 5/18/2018  Most recent mammogram: scheduled 5/25/2018  Most recent pap smear:  Years  First day of last menses: 1990    Regular dental visits: Current  Regular eye exams: Current    The following portions of the patient's history were reviewed and updated as appropriate: allergies, current medications, past family history, past medical history, past social history, past surgical history and problem list.    Allergies   Allergen Reactions   • Codeine Irritability   • Nyquil Multi-Symptom [Pseudoeph-Doxylamine-Dm-Apap] Irritability     Social History   Substance Use Topics   • Smoking status: Never Smoker   • Smokeless tobacco: Not on file   • Alcohol use No     Past Surgical History:   Procedure Laterality Date   • HYSTERECTOMY       Family History   Problem Relation Age of Onset   • Dementia Mother    • Cancer Father    • Heart attack Maternal Grandfather    • Anuerysm Maternal  Grandfather    • Heart attack Paternal Grandmother    • Cancer Paternal Grandmother    • Dementia Paternal Grandfather          Current Outpatient Prescriptions:   •  albuterol (PROVENTIL HFA;VENTOLIN HFA) 108 (90 Base) MCG/ACT inhaler, Inhale 2 puffs Every 4 (Four) Hours As Needed for Wheezing., Disp: 1 inhaler, Rfl: 0  •  Multiple Vitamins-Minerals (MULTIVITAMINS/MINERALS ADULT PO), Take  by mouth., Disp: , Rfl:   •  doxycycline (MONODOX) 100 MG capsule, Take 1 capsule by mouth 2 (Two) Times a Day., Disp: 20 capsule, Rfl: 0  •  fluticasone-salmeterol (ADVAIR DISKUS) 100-50 MCG/DOSE DISKUS, Inhale 1 puff 2 (Two) Times a Day., Disp: 60 each, Rfl: 6  •  predniSONE (DELTASONE) 20 MG tablet, Take 1 tablet by mouth 2 (Two) Times a Day., Disp: 10 tablet, Rfl: 0    Patient Active Problem List   Diagnosis   • Pneumonia of both lungs due to infectious organism   • Cough   • Asthma       Review of Systems   Constitutional: Negative.    HENT: Negative.    Eyes: Negative.    Respiratory: Positive for cough, chest tightness, shortness of breath and wheezing. Negative for apnea, choking and stridor.    Cardiovascular: Negative for chest pain, palpitations and leg swelling.   Gastrointestinal: Negative.         Right inguinal pain   Endocrine: Negative.    Genitourinary: Negative.    Musculoskeletal: Positive for back pain.   Skin: Negative.    Allergic/Immunologic: Negative.    Neurological: Negative.    Hematological: Negative.    Psychiatric/Behavioral: Negative.        Objective   Vitals:    05/04/18 1057   BP: 110/64   Pulse: 92   Resp: 16   Temp: 98.7 °F (37.1 °C)   SpO2: 94%     Physical Exam   Constitutional: She is oriented to person, place, and time. She appears well-developed and well-nourished. She is cooperative. She is easily aroused.  Non-toxic appearance. She does not have a sickly appearance. She does not appear ill. No distress.   Thin frame   HENT:   Head: Normocephalic and atraumatic. Head is without abrasion.  Hair is normal.   Right Ear: Hearing, tympanic membrane, external ear and ear canal normal. No foreign bodies. Tympanic membrane is not perforated and not erythematous.   Left Ear: Hearing, tympanic membrane, external ear and ear canal normal. No foreign bodies. Tympanic membrane is not perforated and not erythematous.   Nose: Nose normal. No mucosal edema, rhinorrhea or septal deviation. No epistaxis.  No foreign bodies.   Mouth/Throat: Oropharynx is clear and moist. No oral lesions. Normal dentition.   Eyes: EOM and lids are normal. Pupils are equal, round, and reactive to light. Right eye exhibits no discharge. Left eye exhibits no discharge. Right conjunctiva is not injected. Left conjunctiva is not injected. No scleral icterus.   Neck: Normal range of motion and full passive range of motion without pain. Neck supple. No edema and normal range of motion present. No thyroid mass and no thyromegaly present.   Cardiovascular: Normal rate, regular rhythm and normal heart sounds.  Exam reveals no gallop and no friction rub.    No murmur heard.  Pulmonary/Chest: Effort normal. No accessory muscle usage. She has wheezes in the right upper field. She has rhonchi. She has no rales. She exhibits no mass, no tenderness, no bony tenderness, no laceration, no crepitus, no edema, no deformity, no swelling and no retraction. Right breast exhibits no inverted nipple, no mass, no nipple discharge, no skin change and no tenderness. Left breast exhibits no inverted nipple, no mass, no nipple discharge, no skin change and no tenderness. Breasts are symmetrical. There is no breast swelling.   Non productive cough   Abdominal: Soft. Bowel sounds are normal. She exhibits no distension. There is no hepatosplenomegaly or hepatomegaly. There is tenderness. There is no CVA tenderness. A hernia is present. Hernia confirmed positive in the right inguinal area.   Right inguinal tenderness   Genitourinary: Vagina normal. Rectal exam shows  fissure. Pelvic exam was performed with patient prone. There is no rash, tenderness, lesion, injury or Bartholin's cyst on the right labia. There is no rash, tenderness, lesion, injury or Bartholin's cyst on the left labia. Uterus is absent.   Cervix is absent.   Genitourinary Comments: Pap smear obtained   Musculoskeletal: Normal range of motion. She exhibits no edema, tenderness or deformity.   Lymphadenopathy:     She has no cervical adenopathy.   Neurological: She is alert, oriented to person, place, and time and easily aroused. She has normal reflexes. No cranial nerve deficit. Coordination normal.   Muscle strength 5/5 and equal throughout.   Skin: Skin is warm, dry and intact. No abrasion and no rash noted. She is not diaphoretic. No cyanosis or erythema. Nails show no clubbing.   Psychiatric: She has a normal mood and affect. Her speech is normal and behavior is normal.   Nursing note and vitals reviewed.        Results for orders placed or performed in visit on 04/23/18   POC Rapid Strep A   Result Value Ref Range    Rapid Strep A Screen Negative Negative, VALID, INVALID, Not Performed    Internal Control Passed Passed    Lot Number [id number]     Expiration Date 11/30/19        Assessment/Plan   Jud was seen today for cough and hernia.    Diagnoses and all orders for this visit:    Moderate persistent asthma, unspecified whether complicated  -     Ambulatory Referral to Pulmonology  -     fluticasone-salmeterol (ADVAIR DISKUS) 100-50 MCG/DOSE DISKUS; Inhale 1 puff 2 (Two) Times a Day.  -     predniSONE (DELTASONE) 20 MG tablet; Take 1 tablet by mouth 2 (Two) Times a Day.  Cough  -     XR Chest 2 View; Future  -     CBC & Differential  -     predniSONE (DELTASONE) 20 MG tablet; Take 1 tablet by mouth 2 (Two) Times a Day.  -     doxycycline (MONODOX) 100 MG capsule; Take 1 capsule by mouth 2 (Two) Times a Day.    History of recurrent pneumonia  -     Ambulatory Referral to Pulmonology  -     XR Chest 2  View; Future  -     CBC & Differential  Right inguinal pain  -     US Abdomen Complete; Future  Encounter for preventive health examination    Pap test, as part of routine gynecological examination  -     Liquid-based Pap Smear, Screening; Future    Other orders  -     albuterol (PROVENTIL HFA;VENTOLIN HFA) 108 (90 Base) MCG/ACT inhaler; Inhale 2 puffs Every 4 (Four) Hours As Needed for Wheezing.      Discussed to monitor cough for change and if worsening symptoms require emergency evaluation to proceed to emergency room.           Patient education discussed during this visit:  - avoidance of texting while driving and the need for wearing seatbelt  - use of sunscreen  - healthy sleep habits and appropriate amount of sleep  - H2O consumption, well-balanced diet  - exercise routine which includes at least 150 minutes of cardio per week + muscle strengthening exercises  - immunizations including annual flu vaccination      Return in about 3 months (around 8/4/2018).

## 2018-05-11 ENCOUNTER — HOSPITAL ENCOUNTER (OUTPATIENT)
Dept: ULTRASOUND IMAGING | Facility: HOSPITAL | Age: 58
Discharge: HOME | End: 2018-05-11
Admitting: NURSE PRACTITIONER

## 2018-05-11 ENCOUNTER — TELEPHONE (OUTPATIENT)
Dept: INTERNAL MEDICINE | Facility: CLINIC | Age: 58
End: 2018-05-11

## 2018-05-11 DIAGNOSIS — Z12.11 SCREENING FOR COLON CANCER: Primary | ICD-10-CM

## 2018-05-11 DIAGNOSIS — R10.31 RIGHT INGUINAL PAIN: ICD-10-CM

## 2018-05-11 PROCEDURE — 76857 US EXAM PELVIC LIMITED: CPT

## 2018-05-11 RX ORDER — SODIUM, POTASSIUM,MAG SULFATES 17.5-3.13G
1 SOLUTION, RECONSTITUTED, ORAL ORAL TAKE AS DIRECTED
Qty: 2 BOTTLE | Refills: 0 | Status: SHIPPED | OUTPATIENT
Start: 2018-05-11 | End: 2018-06-20

## 2018-05-11 NOTE — TELEPHONE ENCOUNTER
Spoke with pt regarding negative pap results. She verbalized understanding, thanked our office and we ended the call.

## 2018-05-14 ENCOUNTER — TELEPHONE (OUTPATIENT)
Dept: INTERNAL MEDICINE | Facility: CLINIC | Age: 58
End: 2018-05-14

## 2018-05-14 NOTE — TELEPHONE ENCOUNTER
----- Message from Julia Padilla sent at 5/14/2018 11:05 AM EDT -----  PATIENT STATED SHE WAS RETURNING A CALL AND WAS UNSURE WHAT IT WAS IN REGARDS TO .     PLEASE CALL PATIENT AT : [phone]    THANK YOU

## 2018-05-18 ENCOUNTER — OUTSIDE FACILITY SERVICE (OUTPATIENT)
Dept: GASTROENTEROLOGY | Facility: CLINIC | Age: 58
End: 2018-05-18

## 2018-05-18 PROCEDURE — G0121 COLON CA SCRN NOT HI RSK IND: HCPCS | Performed by: INTERNAL MEDICINE

## 2018-05-25 ENCOUNTER — APPOINTMENT (OUTPATIENT)
Dept: MAMMOGRAPHY | Facility: HOSPITAL | Age: 58
End: 2018-05-25

## 2018-05-25 ENCOUNTER — HOSPITAL ENCOUNTER (OUTPATIENT)
Dept: MAMMOGRAPHY | Facility: HOSPITAL | Age: 58
Discharge: HOME | End: 2018-05-25
Admitting: NURSE PRACTITIONER

## 2018-05-25 DIAGNOSIS — Z12.31 SCREENING MAMMOGRAM, ENCOUNTER FOR: ICD-10-CM

## 2018-05-25 PROCEDURE — 77067 SCR MAMMO BI INCL CAD: CPT

## 2018-05-25 PROCEDURE — 77067 SCR MAMMO BI INCL CAD: CPT | Performed by: RADIOLOGY

## 2018-05-25 PROCEDURE — 77063 BREAST TOMOSYNTHESIS BI: CPT

## 2018-05-25 PROCEDURE — 77063 BREAST TOMOSYNTHESIS BI: CPT | Performed by: RADIOLOGY

## 2018-06-08 ENCOUNTER — HOSPITAL ENCOUNTER (OUTPATIENT)
Dept: MAMMOGRAPHY | Facility: HOSPITAL | Age: 58
Discharge: HOME | End: 2018-06-08
Admitting: NURSE PRACTITIONER

## 2018-06-08 ENCOUNTER — HOSPITAL ENCOUNTER (OUTPATIENT)
Dept: ULTRASOUND IMAGING | Facility: HOSPITAL | Age: 58
Discharge: HOME | End: 2018-06-08

## 2018-06-08 ENCOUNTER — HOSPITAL ENCOUNTER (OUTPATIENT)
Dept: MAMMOGRAPHY | Facility: HOSPITAL | Age: 58
Discharge: HOME | End: 2018-06-08

## 2018-06-08 DIAGNOSIS — R92.8 ABNORMAL MAMMOGRAM: ICD-10-CM

## 2018-06-08 PROCEDURE — 77065 DX MAMMO INCL CAD UNI: CPT | Performed by: RADIOLOGY

## 2018-06-08 PROCEDURE — 19083 BX BREAST 1ST LESION US IMAG: CPT | Performed by: RADIOLOGY

## 2018-06-08 PROCEDURE — 77066 DX MAMMO INCL CAD BI: CPT

## 2018-06-08 PROCEDURE — 88305 TISSUE EXAM BY PATHOLOGIST: CPT | Performed by: NURSE PRACTITIONER

## 2018-06-08 PROCEDURE — 76642 ULTRASOUND BREAST LIMITED: CPT | Performed by: RADIOLOGY

## 2018-06-08 PROCEDURE — 76642 ULTRASOUND BREAST LIMITED: CPT

## 2018-06-08 PROCEDURE — G0279 TOMOSYNTHESIS, MAMMO: HCPCS

## 2018-06-08 PROCEDURE — A4648 IMPLANTABLE TISSUE MARKER: HCPCS

## 2018-06-08 PROCEDURE — 77062 BREAST TOMOSYNTHESIS BI: CPT | Performed by: RADIOLOGY

## 2018-06-08 RX ORDER — LIDOCAINE HYDROCHLORIDE 10 MG/ML
5 INJECTION, SOLUTION INFILTRATION; PERINEURAL ONCE
Status: COMPLETED | OUTPATIENT
Start: 2018-06-08 | End: 2018-06-08

## 2018-06-08 RX ADMIN — LIDOCAINE HYDROCHLORIDE 5 ML: 10 INJECTION, SOLUTION INFILTRATION; PERINEURAL at 16:29

## 2018-06-11 LAB
CYTO UR: NORMAL
LAB AP CASE REPORT: NORMAL
LAB AP CLINICAL INFORMATION: NORMAL
LAB AP DIAGNOSIS COMMENT: NORMAL
Lab: NORMAL
PATH REPORT.FINAL DX SPEC: NORMAL
PATH REPORT.GROSS SPEC: NORMAL

## 2018-06-12 ENCOUNTER — TRANSCRIBE ORDERS (OUTPATIENT)
Dept: MAMMOGRAPHY | Facility: HOSPITAL | Age: 58
End: 2018-06-12

## 2018-06-12 ENCOUNTER — TELEPHONE (OUTPATIENT)
Dept: MAMMOGRAPHY | Facility: HOSPITAL | Age: 58
End: 2018-06-12

## 2018-06-12 DIAGNOSIS — R92.8 ABNORMAL MAMMOGRAM: Primary | ICD-10-CM

## 2018-06-20 ENCOUNTER — OFFICE VISIT (OUTPATIENT)
Dept: INTERNAL MEDICINE | Facility: CLINIC | Age: 58
End: 2018-06-20

## 2018-06-20 VITALS
OXYGEN SATURATION: 98 % | HEIGHT: 66 IN | SYSTOLIC BLOOD PRESSURE: 98 MMHG | RESPIRATION RATE: 16 BRPM | DIASTOLIC BLOOD PRESSURE: 62 MMHG | TEMPERATURE: 97.4 F | WEIGHT: 124 LBS | BODY MASS INDEX: 19.93 KG/M2 | HEART RATE: 100 BPM

## 2018-06-20 DIAGNOSIS — J45.909 MILD ASTHMA, UNSPECIFIED WHETHER COMPLICATED, UNSPECIFIED WHETHER PERSISTENT: Primary | ICD-10-CM

## 2018-06-20 DIAGNOSIS — Z87.01 HISTORY OF PNEUMONIA, RECURRENT: ICD-10-CM

## 2018-06-20 DIAGNOSIS — R09.1 PLEURISY: ICD-10-CM

## 2018-06-20 DIAGNOSIS — R06.02 SHORTNESS OF BREATH: ICD-10-CM

## 2018-06-20 DIAGNOSIS — R05.9 COUGH: ICD-10-CM

## 2018-06-20 PROCEDURE — 99214 OFFICE O/P EST MOD 30 MIN: CPT | Performed by: PHYSICIAN ASSISTANT

## 2018-06-20 RX ORDER — LEVOFLOXACIN 500 MG/1
500 TABLET, FILM COATED ORAL DAILY
Qty: 10 TABLET | Refills: 0 | Status: SHIPPED | OUTPATIENT
Start: 2018-06-20 | End: 2018-07-12

## 2018-06-20 NOTE — PROGRESS NOTES
Chief Complaint   Patient presents with   • Cough     cough that is making pt SOA, she has been using inhaler. keeps getting worse. now causing back pain, constant h/a, also taking ibuprofen.       Subjective       History of Present Illness     Jud Garnica is a 57 y.o. female. She presents with 5 day history productive cough, SOA, and pain with deep breathing. She states that cough is productive with yellow sputum. Having increased SOA, wheezing and chest tightness. Also moderate headache and fatigue. Chills x3 days but no known fever.  Has right-sided rib/lung pain with deep breathing which radiates to R-side of back at posterior ribs as well. No significant pain without deep breathing. She is using inhaler and this improves symptoms. She is using Ibuprofen and this partially relieves pain but not complete resolution if continues deep breathing. Headache improved with ibuprofen. She has hx recurrent pneumonia and has appt with pulmonology next month as new patient. Most recent pneumonia in March/April 2018. Previously required hospitalization for pneumonia.   She denies fevers, abdominal pain, N/V/D, sore throat, ear pain, sinus pressure.      The following portions of the patient's history were reviewed and updated as appropriate: allergies, current medications, past medical history, past social history and problem list.    Allergies   Allergen Reactions   • Codeine Irritability   • Nyquil Multi-Symptom [Pseudoeph-Doxylamine-Dm-Apap] Irritability     Social History   Substance Use Topics   • Smoking status: Never Smoker   • Smokeless tobacco: Not on file   • Alcohol use No         Current Outpatient Prescriptions:   •  albuterol (PROVENTIL HFA;VENTOLIN HFA) 108 (90 Base) MCG/ACT inhaler, Inhale 2 puffs Every 4 (Four) Hours As Needed for Wheezing., Disp: 1 inhaler, Rfl: 0  •  Multiple Vitamins-Minerals (MULTIVITAMINS/MINERALS ADULT PO), Take  by mouth., Disp: , Rfl:   •  levoFLOXacin (LEVAQUIN) 500 MG  tablet, Take 1 tablet by mouth Daily., Disp: 10 tablet, Rfl: 0    Review of Systems   Constitutional: Positive for appetite change, chills and fatigue. Negative for fever and unexpected weight loss.   HENT: Negative for ear pain, postnasal drip, sinus pressure, sneezing, sore throat and trouble swallowing.    Eyes: Negative for pain.   Respiratory: Positive for cough, chest tightness, shortness of breath and wheezing.    Cardiovascular: Negative for chest pain and palpitations.   Gastrointestinal: Negative for abdominal pain, diarrhea, nausea and vomiting.   Genitourinary: Negative for dysuria and hematuria.   Musculoskeletal: Positive for arthralgias (rib pain).   Skin: Negative for rash.   Neurological: Positive for headache. Negative for dizziness, weakness and light-headedness.   Hematological: Does not bruise/bleed easily.       Objective   Vitals:    06/20/18 1720   BP: 98/62   Pulse: 100   Resp: 16   Temp: 97.4 °F (36.3 °C)   SpO2: 98%     Physical Exam   Constitutional: She appears well-developed and well-nourished.   HENT:   Head: Normocephalic and atraumatic.   Right Ear: Tympanic membrane, external ear and ear canal normal.   Left Ear: Tympanic membrane, external ear and ear canal normal.   Nose: Nose normal.   Mouth/Throat: Oropharynx is clear and moist and mucous membranes are normal.   Eyes: Conjunctivae are normal. Pupils are equal, round, and reactive to light.   Neck: Normal range of motion. Neck supple. Carotid bruit is not present. No thyromegaly present.   Cardiovascular: Normal rate, regular rhythm and intact distal pulses.  Exam reveals no friction rub.    No murmur heard.  Pulmonary/Chest: Effort normal. She has wheezes in the right upper field and the left upper field. She has no rhonchi. She has no rales. She exhibits no mass, no tenderness and no crepitus.   Abdominal: Soft. There is no hepatosplenomegaly. There is no tenderness.   Lymphadenopathy:     She has no cervical adenopathy.    Skin: No rash noted.   Psychiatric: She has a normal mood and affect. Her behavior is normal.         Assessment/Plan   Jud was seen today for cough.    Diagnoses and all orders for this visit:    Mild asthma, unspecified whether complicated, unspecified whether persistent  -     levoFLOXacin (LEVAQUIN) 500 MG tablet; Take 1 tablet by mouth Daily.  -     XR Chest PA & Lateral; Future    Cough  -     levoFLOXacin (LEVAQUIN) 500 MG tablet; Take 1 tablet by mouth Daily.  -     XR Chest PA & Lateral; Future    Shortness of breath  -     levoFLOXacin (LEVAQUIN) 500 MG tablet; Take 1 tablet by mouth Daily.  -     XR Chest PA & Lateral; Future    History of pneumonia, recurrent  -     levoFLOXacin (LEVAQUIN) 500 MG tablet; Take 1 tablet by mouth Daily.  -     XR Chest PA & Lateral; Future    Pleurisy    Will return for CXR tomorrow morning, no acute concerns for stat CXR. Will review with patient by phone tomorrow.   Begin empiric Abx therapy with pt hx recurrent pneumonia.   Pleurisy vs costochondritis secondary to coughing.   Continue Abx through completion.   Discussed side effects of medication, no intense exercise while on levaquin.  Continue inhaler PRN. Continue Ibuprofen PRN.   Keep pulm appt next month for recurrent pneumonia.            Return if symptoms worsen or fail to improve.

## 2018-06-21 ENCOUNTER — HOSPITAL ENCOUNTER (OUTPATIENT)
Dept: GENERAL RADIOLOGY | Facility: HOSPITAL | Age: 58
Discharge: HOME | End: 2018-06-21
Admitting: PHYSICIAN ASSISTANT

## 2018-06-21 DIAGNOSIS — Z87.01 HISTORY OF PNEUMONIA, RECURRENT: ICD-10-CM

## 2018-06-21 DIAGNOSIS — R05.9 COUGH: ICD-10-CM

## 2018-06-21 DIAGNOSIS — J45.909 MILD ASTHMA, UNSPECIFIED WHETHER COMPLICATED, UNSPECIFIED WHETHER PERSISTENT: ICD-10-CM

## 2018-06-21 DIAGNOSIS — R06.02 SHORTNESS OF BREATH: ICD-10-CM

## 2018-06-21 PROCEDURE — 71046 X-RAY EXAM CHEST 2 VIEWS: CPT

## 2018-07-12 ENCOUNTER — OFFICE VISIT (OUTPATIENT)
Dept: PULMONOLOGY | Facility: CLINIC | Age: 58
End: 2018-07-12

## 2018-07-12 VITALS
SYSTOLIC BLOOD PRESSURE: 110 MMHG | TEMPERATURE: 98.6 F | WEIGHT: 124 LBS | HEART RATE: 82 BPM | DIASTOLIC BLOOD PRESSURE: 70 MMHG | HEIGHT: 67 IN | OXYGEN SATURATION: 98 % | BODY MASS INDEX: 19.46 KG/M2

## 2018-07-12 DIAGNOSIS — R06.02 SHORTNESS OF BREATH: Primary | ICD-10-CM

## 2018-07-12 DIAGNOSIS — J45.909 MILD ASTHMA, UNSPECIFIED WHETHER COMPLICATED, UNSPECIFIED WHETHER PERSISTENT: ICD-10-CM

## 2018-07-12 DIAGNOSIS — J18.9 PNEUMONIA OF BOTH LOWER LOBES DUE TO INFECTIOUS ORGANISM: ICD-10-CM

## 2018-07-12 DIAGNOSIS — R05.9 COUGH: ICD-10-CM

## 2018-07-12 LAB
BASOPHILS # BLD AUTO: 0.02 10*3/MM3 (ref 0–0.2)
BASOPHILS NFR BLD AUTO: 0.6 % (ref 0–1)
DEPRECATED RDW RBC AUTO: 50.5 FL (ref 37–54)
EOSINOPHIL # BLD AUTO: 0.13 10*3/MM3 (ref 0–0.3)
EOSINOPHIL NFR BLD AUTO: 3.6 % (ref 0–3)
ERYTHROCYTE [DISTWIDTH] IN BLOOD BY AUTOMATED COUNT: 16.2 % (ref 11.3–14.5)
HCT VFR BLD AUTO: 39.1 % (ref 34.5–44)
HGB BLD-MCNC: 12.4 G/DL (ref 11.5–15.5)
IMM GRANULOCYTES # BLD: 0.01 10*3/MM3 (ref 0–0.03)
IMM GRANULOCYTES NFR BLD: 0.3 % (ref 0–0.6)
LYMPHOCYTES # BLD AUTO: 1.45 10*3/MM3 (ref 0.6–4.8)
LYMPHOCYTES NFR BLD AUTO: 40.2 % (ref 24–44)
MCH RBC QN AUTO: 26.9 PG (ref 27–31)
MCHC RBC AUTO-ENTMCNC: 31.7 G/DL (ref 32–36)
MCV RBC AUTO: 84.8 FL (ref 80–99)
MONOCYTES # BLD AUTO: 0.52 10*3/MM3 (ref 0–1)
MONOCYTES NFR BLD AUTO: 14.4 % (ref 0–12)
NEUTROPHILS # BLD AUTO: 1.48 10*3/MM3 (ref 1.5–8.3)
NEUTROPHILS NFR BLD AUTO: 40.9 % (ref 41–71)
PLATELET # BLD AUTO: 211 10*3/MM3 (ref 150–450)
PMV BLD AUTO: 9.5 FL (ref 6–12)
RBC # BLD AUTO: 4.61 10*6/MM3 (ref 3.89–5.14)
WBC NRBC COR # BLD: 3.61 10*3/MM3 (ref 3.5–10.8)

## 2018-07-12 PROCEDURE — 94729 DIFFUSING CAPACITY: CPT | Performed by: NURSE PRACTITIONER

## 2018-07-12 PROCEDURE — 36415 COLL VENOUS BLD VENIPUNCTURE: CPT | Performed by: NURSE PRACTITIONER

## 2018-07-12 PROCEDURE — 86606 ASPERGILLUS ANTIBODY: CPT | Performed by: NURSE PRACTITIONER

## 2018-07-12 PROCEDURE — 99215 OFFICE O/P EST HI 40 MIN: CPT | Performed by: NURSE PRACTITIONER

## 2018-07-12 PROCEDURE — 85025 COMPLETE CBC W/AUTO DIFF WBC: CPT | Performed by: NURSE PRACTITIONER

## 2018-07-12 PROCEDURE — 86609 BACTERIUM ANTIBODY: CPT | Performed by: NURSE PRACTITIONER

## 2018-07-12 PROCEDURE — 86602 ANTINOMYCES ANTIBODY: CPT | Performed by: NURSE PRACTITIONER

## 2018-07-12 PROCEDURE — 86635 COCCIDIOIDES ANTIBODY: CPT | Performed by: NURSE PRACTITIONER

## 2018-07-12 PROCEDURE — 94375 RESPIRATORY FLOW VOLUME LOOP: CPT | Performed by: NURSE PRACTITIONER

## 2018-07-12 PROCEDURE — 86480 TB TEST CELL IMMUN MEASURE: CPT | Performed by: NURSE PRACTITIONER

## 2018-07-12 PROCEDURE — 82785 ASSAY OF IGE: CPT | Performed by: NURSE PRACTITIONER

## 2018-07-12 PROCEDURE — 86612 BLASTOMYCES ANTIBODY: CPT | Performed by: NURSE PRACTITIONER

## 2018-07-12 PROCEDURE — 94726 PLETHYSMOGRAPHY LUNG VOLUMES: CPT | Performed by: NURSE PRACTITIONER

## 2018-07-12 PROCEDURE — 86331 IMMUNODIFFUSION OUCHTERLONY: CPT | Performed by: NURSE PRACTITIONER

## 2018-07-12 PROCEDURE — 86671 FUNGUS NES ANTIBODY: CPT | Performed by: NURSE PRACTITIONER

## 2018-07-12 PROCEDURE — 86698 HISTOPLASMA ANTIBODY: CPT | Performed by: NURSE PRACTITIONER

## 2018-07-12 RX ORDER — MONTELUKAST SODIUM 10 MG/1
10 TABLET ORAL NIGHTLY
Qty: 30 TABLET | Refills: 11 | Status: SHIPPED | OUTPATIENT
Start: 2018-07-12 | End: 2018-08-31 | Stop reason: HOSPADM

## 2018-07-12 NOTE — PROGRESS NOTES
[facility] Pulmonary Evaluation    CHIEF COMPLAINT    Asthma, recurrent PNA     Refered by:  Felecia Santizo APRN        HISTORY OF PRESENT ILLNESS    Jud Garniac is a 57 y.o.female here today for evaluation of some recurrent pneumonias last few months as well as a lifelong history of asthma she was hospitalized for several months when she was 4 years old due to a bilateral pneumonia.  Her asthma is actually fairly controlled, when she is not acutely ill.  She does have a round yearly episodes of bronchitis or pneumonia.  She does not have any exacerbations, wheezing, shortness of air, she is ill.  The acute illnesses started about the same she has some postnasal drainage allergy pressure with a productive green sputum.  That she becomes very short of breath with wheezing and generalized fatigue.  Usually is self-limiting with a course of antibiotics and steroids.  However since March she has had reoccurrence of a right lower lobe or bilateral multifocal pneumonia.  She is in the hospital in March at [facility] with a multifocal pneumonia and then again in June on a chest x-ray she had a right lower lobe infiltrate.    Today she comes in for evaluation, her symptoms have resolved at this point.  Her chest x-ray shows resolution of the right lower lobe opacity.  She does not take any medications for her asthma, she tried Advair in the past with some episodes of dizziness and lightheadedness.    She denies any reflux symptoms, nocturnal cough or reflux, any episodes of aspiration or dysphagia.  She does not have any allergy symptoms chronically just the coughing up thick green mucus and postnasal drainage when she is ill.    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  She has a known history of allergies to mold, cat, dogs, and horses.    Patient Active Problem List   Diagnosis   • Pneumonia of both lungs due to  "infectious organism   • Cough   • Asthma       Allergies   Allergen Reactions   • Codeine Irritability   • Nyquil Multi-Symptom [Pseudoeph-Doxylamine-Dm-Apap] Irritability       Current Outpatient Prescriptions:   •  albuterol (PROVENTIL HFA;VENTOLIN HFA) 108 (90 Base) MCG/ACT inhaler, Inhale 2 puffs Every 4 (Four) Hours As Needed for Wheezing., Disp: 1 inhaler, Rfl: 0  •  Multiple Vitamins-Minerals (MULTIVITAMINS/MINERALS ADULT PO), Take  by mouth., Disp: , Rfl:   •  Fluticasone-Salmeterol (ADVAIR HFA IN), Inhale As Needed., Disp: , Rfl:     MEDICATION LIST AND ALLERGIES REVIEWED.    Social History   Substance Use Topics   • Smoking status: Never Smoker   • Smokeless tobacco: Never Used   • Alcohol use No       FAMILY AND SOCIAL HISTORY REVIEWED AND UPDATED IN EPIC    Review of Systems   Constitutional: Negative for chills, fatigue, fever and unexpected weight change.   HENT: Negative for congestion, nosebleeds, postnasal drip, rhinorrhea, sinus pressure and trouble swallowing.    Respiratory: Negative for cough, chest tightness, shortness of breath and wheezing.    Cardiovascular: Negative for chest pain and leg swelling.   Gastrointestinal: Negative for abdominal pain, constipation, diarrhea, nausea and vomiting.   Genitourinary: Negative for dysuria, frequency, hematuria and urgency.   Musculoskeletal: Negative for myalgias.   Neurological: Negative for dizziness, weakness, numbness and headaches.   All other systems reviewed and are negative.  .    /70 (BP Location: Right arm, Patient Position: Sitting, Cuff Size: Adult)   Pulse 82   Temp 98.6 °F (37 °C)   Ht 170.2 cm (67\")   Wt 56.2 kg (124 lb)   SpO2 98%   BMI 19.42 kg/m²   Immunization History   Administered Date(s) Administered   • Tdap 04/23/2018       Physical Exam   Constitutional: She is oriented to person, place, and time. She appears well-developed and well-nourished.   HENT:   Head: Normocephalic and atraumatic.   Eyes: EOM are normal. " Pupils are equal, round, and reactive to light.   Neck: Normal range of motion. Neck supple.   Cardiovascular: Normal rate and regular rhythm.    No murmur heard.  Pulmonary/Chest: Effort normal and breath sounds normal. No respiratory distress. She has no wheezes. She has no rales.   Abdominal: Soft. Bowel sounds are normal. She exhibits no distension.   Musculoskeletal: Normal range of motion. She exhibits no edema.   Neurological: She is alert and oriented to person, place, and time.   Skin: Skin is warm and dry. No erythema.   Psychiatric: She has a normal mood and affect. Her behavior is normal.   Vitals reviewed.      RESULTS    Lab Results   Component Value Date    WBC 10.47 05/04/2018    HGB 10.9 (L) 05/04/2018    HCT 33.7 (L) 05/04/2018    MCV 83.0 05/04/2018     05/04/2018     Lab Results   Component Value Date    GLUCOSE 81 04/13/2018    CALCIUM 8.8 04/13/2018     04/13/2018    K 3.9 04/13/2018    CO2 29.0 04/13/2018     04/13/2018    BUN 15 04/13/2018    CREATININE 0.80 04/13/2018    EGFRIFNONA 74 04/13/2018    BCR 18.8 04/13/2018    ANIONGAP 6.0 04/13/2018       PFTs done in the office today, and read by me:  Show restriction, but within normal total lung capacity of 5.45, 97%.  It appears to be inaccurate testing however.    PA/LAT CXR done in the office today, and reviewed by me:  Resolution of the right lower lobe infiltrate, chronic changes.  Mild flattening of the diaphragms.    CXR 6/21/18  FINDINGS: The cardiac size is borderline enlarged and unchanged.  Pulmonary vascularity within normal limits. Chronic lung changes again  noted with hyperinflated appearance. Ill-defined opacifications right  lung base consistent with acute pneumonia. Trace right pleural effusion.  No pneumothorax.         IMPRESSION:  Right lower lobe pneumonia with trace right pleural  Effusion.    CXR 5/4/18  FINDINGS: The cardiac silhouette is normal. There are chronic  obstructive and postinflammatory  pulmonary changes. Additionally there  is a new patchy airspace process in the right lower lobe.         IMPRESSION:  There is a new small patchy airspace process involving the  right lower lobe in the region of the costophrenic angle when compared  with 03/17/2018.    CXR 3/17/18  FINDINGS: Lungs are hyperinflated overall. There is patchy right middle  lobe interstitial and airspace disease consistent with pneumonia. The  lateral view appears to show patchy disease in the posterior chest, most  likely in the retrocardiac region of the left lower lobe, as seen on the  frontal radiograph. Lungs elsewhere appear clear. No effusion is seen.  The heart and vasculature appear normal in size.         IMPRESSION:  Right middle lobe pneumonia and probably early left lower  lobe pneumonia.    CT Chest 3/17/18  FINDINGS: Patchy airspace disease is seen in both medial lower lobes  with small focal areas of consolidation and to a lesser degree in the  right middle lobe and lingula, with peribronchial thickening. The upper  lungs appear clear. No effusion is seen. Mediastinal window images show  no evidence of adenopathy or pericardial effusion.     Included images of the upper abdomen show no gross abnormalities of the  visualized portions of the liver, spleen, pancreatic tail, adrenal  glands, or upper renal poles.     IMPRESSION:  Multifocal pneumonia with moderate disease of both medial  right and left lower lobes and much milder lingular and middle lobe  disease.    PROBLEM LIST    Problem List Items Addressed This Visit        Respiratory    Pneumonia of both lungs due to infectious organism    Relevant Medications    Fluticasone-Salmeterol (ADVAIR HFA IN)    Other Relevant Orders    CBC Auto Differential    QuantiFERON TB Gold    IgE    Hypersensitivity Pneumonitis Profile    Fungal Antibodies, Quant    Cough    Asthma    Relevant Medications    Fluticasone-Salmeterol (ADVAIR HFA IN)      Other Visit Diagnoses      Shortness of breath    -  Primary    Relevant Orders    Pulmonary Function Test (Completed)    XR Chest PA & Lateral            DISCUSSION    Mrs. Garnica has had chronic issue since she was a child with his recurrent bronchitis or pneumonia.  As well as exercise and activity intolerance.  Her PFTs appear severely obstructed with air trapping, however she doesn't have significant emphysema on her CT scan.  She has no family history of lung disease, she would be at low risk for any awful 1 deficiency or cystic fibrosis.  She does not have any chronic symptoms, including a chronic cough or sputum production, chronic wheezing or frequent exacerbations.    She does have some work exposure history with significant allergies, I'll go ahead and check a hypersensitivity pneumonitis panel as well as fungal antibodies, she did have interaction with a person that was positive for TB salsa and QuantiFERON TB as well.    Her abnormal CT scan was in March of this year with some bilateral lower lobes, if her symptoms return out follow-up on a high-resolution CT scan as well.    At this time she doesn't appear any chronic maintenance medications for her asthma, she does very well without any until she is acutely ill.  I would like her to try some Singulair to see if this helps with her reactivity.    I like her to follow-up after the above testing.  If all the above is negative I will pursue an alpha-1 fingerstick.    Follow-up in 6-8 weeks.        NEERAJ Mckinley  07/12/20182:02 PM  Electronically signed     Please note that portions of this note were completed with a voice recognition program. Efforts were made to edit the dictations, but occasionally words are mistranscribed.      CC: NEERAJ Brooks

## 2018-07-15 LAB — TOTAL IGE SMQN RAST: 28 IU/ML (ref 0–100)

## 2018-07-18 LAB
A FUMIGATUS1 AB SER QL ID: NEGATIVE
A PULLULANS AB SER QL: NEGATIVE
LACEYELLA SACCHARI AB SER QL: NEGATIVE
MICROPOLYSPORA FAENI: NEGATIVE
PIGEON SERUM AB QL ID: NEGATIVE
T VULGARIS AB SER QL ID: NEGATIVE

## 2018-07-20 LAB
A FLAVUS AB SER QL ID: NEGATIVE
A FUMIGATUS AB SER QL ID: NEGATIVE
A NIGER AB SER QL ID: NEGATIVE
B DERMAT AB TITR SER: NEGATIVE {TITER}
C IMMITIS AB TITR SER ID: NORMAL {TITER}
H CAPSUL AB TITR SER ID: NEGATIVE {TITER}
INTERPRETATION: NORMAL
M TB TUBERC IFN-G BLD QL: NEGATIVE
QFT TB AG MINUS NIL VALUE: 0.14 IU/ML
QUANTIFERON CRITERIA: NORMAL
QUANTIFERON MITOGEN VALUE: >10 IU/ML
QUANTIFERON NIL VALUE: 0.05 IU/ML
QUANTIFERON TB AG VALUE: 0.19 IU/ML

## 2018-08-31 ENCOUNTER — OFFICE VISIT (OUTPATIENT)
Dept: PULMONOLOGY | Facility: CLINIC | Age: 58
End: 2018-08-31

## 2018-08-31 VITALS
HEIGHT: 67 IN | TEMPERATURE: 97 F | SYSTOLIC BLOOD PRESSURE: 106 MMHG | BODY MASS INDEX: 19.93 KG/M2 | DIASTOLIC BLOOD PRESSURE: 74 MMHG | OXYGEN SATURATION: 98 % | WEIGHT: 127 LBS | HEART RATE: 68 BPM | RESPIRATION RATE: 18 BRPM

## 2018-08-31 DIAGNOSIS — R06.02 SOB (SHORTNESS OF BREATH): Primary | ICD-10-CM

## 2018-08-31 DIAGNOSIS — R05.9 COUGH: ICD-10-CM

## 2018-08-31 DIAGNOSIS — J18.9 PNEUMONIA OF BOTH LOWER LOBES DUE TO INFECTIOUS ORGANISM: ICD-10-CM

## 2018-08-31 DIAGNOSIS — J45.909 MILD ASTHMA, UNSPECIFIED WHETHER COMPLICATED, UNSPECIFIED WHETHER PERSISTENT: ICD-10-CM

## 2018-08-31 PROCEDURE — 94726 PLETHYSMOGRAPHY LUNG VOLUMES: CPT | Performed by: NURSE PRACTITIONER

## 2018-08-31 PROCEDURE — 94729 DIFFUSING CAPACITY: CPT | Performed by: NURSE PRACTITIONER

## 2018-08-31 PROCEDURE — 94060 EVALUATION OF WHEEZING: CPT | Performed by: NURSE PRACTITIONER

## 2018-08-31 PROCEDURE — 99213 OFFICE O/P EST LOW 20 MIN: CPT | Performed by: NURSE PRACTITIONER

## 2018-08-31 RX ORDER — ALBUTEROL SULFATE 90 UG/1
4 AEROSOL, METERED RESPIRATORY (INHALATION) ONCE
Status: COMPLETED | OUTPATIENT
Start: 2018-08-31 | End: 2018-08-31

## 2018-08-31 RX ADMIN — ALBUTEROL SULFATE 4 PUFF: 90 AEROSOL, METERED RESPIRATORY (INHALATION) at 09:03

## 2018-09-12 NOTE — PROGRESS NOTES
Subjective:    Jud Garnica is a 57 y.o. female.     Chief Complaint   Patient presents with   • Follow-up     would like to discuss test results        History of Present Illness     Patient here for follow up.     Patient reports she has seen pulmonologist and asthma is stable. Patient denies shortness of air. She reports she has not used inhaler since recovering from pneumonia in  June. Patient reports asthma is worsened by allergens, specifically- cats, dogs, horses and strong smells. Patient reports she has had  environmental allergies her whole life. Discussed adding Singulair.    Reviewed that patient has had a  normal echocardiogram. Reviewed pulmonology notes with patient and current labs.    Current Outpatient Prescriptions:   •  albuterol (PROVENTIL HFA;VENTOLIN HFA) 108 (90 Base) MCG/ACT inhaler, Inhale 2 puffs Every 4 (Four) Hours As Needed for Wheezing., Disp: 1 inhaler, Rfl: 0  •  Multiple Vitamins-Minerals (MULTIVITAMINS/MINERALS ADULT PO), Take  by mouth., Disp: , Rfl:   •  montelukast (SINGULAIR) 10 MG tablet, Take 1 tablet by mouth Every Night., Disp: 30 tablet, Rfl: 11     The following portions of the patient's history were reviewed and updated as appropriate: allergies, current medications, past family history, past medical history, past social history, past surgical history and problem list.    Review of Systems   Constitutional: Negative for chills, fatigue and fever.   HENT: Negative for congestion, dental problem, mouth sores, nosebleeds, postnasal drip, rhinorrhea, sinus pain, sinus pressure, sneezing and sore throat.    Eyes: Negative for pain, discharge, redness and itching.   Respiratory: Negative for cough, shortness of breath and wheezing.         Asthma-controlled currently. Using albuterol as needed.   Cardiovascular: Negative for chest pain, palpitations and leg swelling.        No BROOKS, orthopnea, PND, or claudication.   Gastrointestinal: Negative for abdominal distention,  "abdominal pain, blood in stool, diarrhea, nausea and vomiting.   Endocrine: Negative for cold intolerance, heat intolerance, polydipsia and polyuria.   Genitourinary: Negative for difficulty urinating, dysuria, frequency, hematuria and urgency.   Musculoskeletal: Negative for arthralgias, gait problem, joint swelling and myalgias.   Skin: Negative for color change, rash and wound.   Allergic/Immunologic: Positive for environmental allergies.   Neurological: Negative for dizziness, syncope, weakness, light-headedness, numbness and headaches.   Hematological: Negative for adenopathy. Does not bruise/bleed easily.   Psychiatric/Behavioral: Negative.        Objective:    /60 (BP Location: Right arm, Patient Position: Sitting, Cuff Size: Adult)   Pulse 68   Temp 97.6 °F (36.4 °C)   Resp 18   Ht 170.2 cm (67\")   Wt 57.2 kg (126 lb)   BMI 19.73 kg/m²     Physical Exam   Constitutional: She is oriented to person, place, and time. She appears well-developed and well-nourished. She is cooperative. She is easily aroused.  Non-toxic appearance. She does not have a sickly appearance. She does not appear ill. No distress.   HENT:   Head: Normocephalic and atraumatic. Head is without abrasion. Hair is normal.   Right Ear: Hearing, tympanic membrane, external ear and ear canal normal. No foreign bodies. Tympanic membrane is not perforated and not erythematous.   Left Ear: Hearing, tympanic membrane, external ear and ear canal normal. No foreign bodies. Tympanic membrane is not perforated and not erythematous.   Nose: Nose normal. No mucosal edema, rhinorrhea or septal deviation. No epistaxis.  No foreign bodies.   Mouth/Throat: Oropharynx is clear and moist. No oral lesions. Normal dentition.   Eyes: Pupils are equal, round, and reactive to light. Conjunctivae and lids are normal. Right eye exhibits no discharge. Left eye exhibits no discharge. Right conjunctiva is not injected. Left conjunctiva is not injected. No " scleral icterus.   Neck: Normal range of motion and full passive range of motion without pain. Neck supple. No edema and normal range of motion present. No thyroid mass and no thyromegaly present.   Cardiovascular: Normal rate, regular rhythm and normal heart sounds.  Exam reveals no gallop and no friction rub.    No murmur heard.  Pulmonary/Chest: Effort normal and breath sounds normal. No accessory muscle usage. She has no rhonchi. She has no rales. She exhibits no tenderness.   Abdominal: Soft. Bowel sounds are normal. She exhibits no distension. There is no hepatosplenomegaly or hepatomegaly. There is no tenderness. There is no CVA tenderness.   Musculoskeletal: Normal range of motion. She exhibits no edema, tenderness or deformity.   Lymphadenopathy:     She has no cervical adenopathy.   Neurological: She is alert, oriented to person, place, and time and easily aroused. She has normal reflexes. No cranial nerve deficit. Coordination normal.   Muscle strength 5/5 and equal throughout.   Skin: Skin is warm, dry and intact. No abrasion and no rash noted. She is not diaphoretic. No cyanosis or erythema. Nails show no clubbing.   Psychiatric: She has a normal mood and affect. Her speech is normal and behavior is normal.   Nursing note and vitals reviewed.      Assessment/Plan:    Jud was seen today for follow-up.    Diagnoses and all orders for this visit:    Mild intermittent asthma, unspecified whether complicated  Continue albuterol as needed  Multiple environmental allergies    Other orders  -     montelukast (SINGULAIR) 10 MG tablet; Take 1 tablet by mouth Every Night.    Encouraged to obtain influenza vaccine with asthma history.    Return in about 6 months (around 3/13/2019).

## 2018-09-13 ENCOUNTER — OFFICE VISIT (OUTPATIENT)
Dept: INTERNAL MEDICINE | Facility: CLINIC | Age: 58
End: 2018-09-13

## 2018-09-13 VITALS
HEIGHT: 67 IN | HEART RATE: 68 BPM | SYSTOLIC BLOOD PRESSURE: 103 MMHG | DIASTOLIC BLOOD PRESSURE: 60 MMHG | BODY MASS INDEX: 19.78 KG/M2 | TEMPERATURE: 97.6 F | RESPIRATION RATE: 18 BRPM | WEIGHT: 126 LBS

## 2018-09-13 DIAGNOSIS — J45.20 MILD INTERMITTENT ASTHMA, UNSPECIFIED WHETHER COMPLICATED: Primary | ICD-10-CM

## 2018-09-13 DIAGNOSIS — Z91.09 MULTIPLE ENVIRONMENTAL ALLERGIES: ICD-10-CM

## 2018-09-13 PROCEDURE — 99213 OFFICE O/P EST LOW 20 MIN: CPT | Performed by: NURSE PRACTITIONER

## 2018-09-13 RX ORDER — MONTELUKAST SODIUM 10 MG/1
10 TABLET ORAL NIGHTLY
Qty: 30 TABLET | Refills: 11 | Status: SHIPPED | OUTPATIENT
Start: 2018-09-13 | End: 2019-09-20 | Stop reason: SINTOL

## 2018-09-24 ENCOUNTER — HOSPITAL ENCOUNTER (OUTPATIENT)
Dept: GENERAL RADIOLOGY | Facility: HOSPITAL | Age: 58
Discharge: HOME | End: 2018-09-24
Admitting: NURSE PRACTITIONER

## 2018-09-24 ENCOUNTER — OFFICE VISIT (OUTPATIENT)
Dept: INTERNAL MEDICINE | Facility: CLINIC | Age: 58
End: 2018-09-24

## 2018-09-24 VITALS
TEMPERATURE: 97.5 F | DIASTOLIC BLOOD PRESSURE: 70 MMHG | BODY MASS INDEX: 19.98 KG/M2 | WEIGHT: 127.6 LBS | OXYGEN SATURATION: 98 % | HEART RATE: 88 BPM | SYSTOLIC BLOOD PRESSURE: 106 MMHG | RESPIRATION RATE: 18 BRPM

## 2018-09-24 DIAGNOSIS — R05.9 COUGH: ICD-10-CM

## 2018-09-24 DIAGNOSIS — J02.9 SORE THROAT: Primary | ICD-10-CM

## 2018-09-24 LAB
EXPIRATION DATE: NORMAL
INTERNAL CONTROL: NORMAL
Lab: NORMAL
S PYO AG THROAT QL: NEGATIVE

## 2018-09-24 PROCEDURE — 87880 STREP A ASSAY W/OPTIC: CPT | Performed by: NURSE PRACTITIONER

## 2018-09-24 PROCEDURE — 99214 OFFICE O/P EST MOD 30 MIN: CPT | Performed by: NURSE PRACTITIONER

## 2018-09-24 PROCEDURE — 71046 X-RAY EXAM CHEST 2 VIEWS: CPT

## 2018-09-24 RX ORDER — AZITHROMYCIN 250 MG/1
TABLET, FILM COATED ORAL
Qty: 6 TABLET | Refills: 0 | Status: SHIPPED | OUTPATIENT
Start: 2018-09-24 | End: 2018-12-21

## 2018-09-24 NOTE — PROGRESS NOTES
Subjective:    Jud Garnica is a 57 y.o. female.     Chief Complaint   Patient presents with   • Sore Throat     4X days    • Cough   • Dizziness       History of Present Illness   Patient complains of sore throat x 4 days. Her temperature was 100.1 yesterday. Grandson that she is caring for has strep. Associated with chills last night and this morning. Sore throat worsened with swallowing. Patient had some relief today with warm salt water gargle. She has a slight frontal headache at 6/0-10 scale. No analgesics taken today.     Patient complains of cough x 4 days. Associated with shortness of breath, but no chest pain. She has had recent fever. Worsned by exposure to ink fumes at work. Patient has history of recurrent pneumonia.    Current Outpatient Prescriptions:   •  albuterol (PROVENTIL HFA;VENTOLIN HFA) 108 (90 Base) MCG/ACT inhaler, Inhale 2 puffs Every 4 (Four) Hours As Needed for Wheezing., Disp: 1 inhaler, Rfl: 0  •  montelukast (SINGULAIR) 10 MG tablet, Take 1 tablet by mouth Every Night., Disp: 30 tablet, Rfl: 11  •  Multiple Vitamins-Minerals (MULTIVITAMINS/MINERALS ADULT PO), Take  by mouth., Disp: , Rfl:   •  azithromycin (ZITHROMAX) 250 MG tablet, Take 2 tablets the first day, then 1 tablet daily for 4 days., Disp: 6 tablet, Rfl: 0     The following portions of the patient's history were reviewed and updated as appropriate: allergies, current medications, past family history, past medical history, past social history, past surgical history and problem list.    Review of Systems   Constitutional: Positive for chills and fever. Negative for fatigue.   HENT: Positive for sore throat. Negative for congestion, ear pain, postnasal drip, rhinorrhea, sinus pressure and sneezing.    Eyes: Negative for pain, discharge, redness and itching.   Respiratory: Positive for cough and shortness of breath. Negative for chest tightness and wheezing.    Cardiovascular: Negative for chest pain.   Gastrointestinal:  Negative for abdominal pain, diarrhea, nausea and vomiting.   Musculoskeletal: Negative for arthralgias and myalgias.   Skin: Negative for rash.   Neurological: Negative for headaches.   Hematological: Negative for adenopathy.       Objective:    /70 (BP Location: Left arm, Patient Position: Sitting, Cuff Size: Adult)   Pulse 88   Temp 97.5 °F (36.4 °C) (Temporal Artery )   Resp 18   Wt 57.9 kg (127 lb 9.6 oz)   SpO2 98%   BMI 19.98 kg/m²     Physical Exam   Constitutional: She is oriented to person, place, and time. She appears well-developed and well-nourished. She is cooperative. She is easily aroused.  Non-toxic appearance. She does not have a sickly appearance. She does not appear ill. No distress.   HENT:   Head: Normocephalic and atraumatic. Head is without abrasion. Hair is normal.   Right Ear: Hearing, tympanic membrane, external ear and ear canal normal. No foreign bodies. Tympanic membrane is not perforated and not erythematous.   Left Ear: Hearing, tympanic membrane, external ear and ear canal normal. No foreign bodies. Tympanic membrane is not perforated and not erythematous.   Nose: Nose normal. No mucosal edema, rhinorrhea or septal deviation. No epistaxis.  No foreign bodies.   Mouth/Throat: Oropharynx is clear and moist. No oral lesions. Normal dentition.   Eyes: Pupils are equal, round, and reactive to light. Conjunctivae and lids are normal. Right eye exhibits no discharge. Left eye exhibits no discharge. Right conjunctiva is not injected. Left conjunctiva is not injected. No scleral icterus.   Neck: Normal range of motion and full passive range of motion without pain. Neck supple. No edema and normal range of motion present. No thyroid mass and no thyromegaly present.   Cardiovascular: Normal rate, regular rhythm and normal heart sounds.  Exam reveals no gallop and no friction rub.    No murmur heard.  Pulmonary/Chest: Effort normal and breath sounds normal. No accessory muscle usage.  She has no rhonchi. She has no rales. She exhibits no tenderness.   Intermittent non productive cough.   Abdominal: Soft. Bowel sounds are normal. She exhibits no distension. There is no hepatosplenomegaly or hepatomegaly. There is no tenderness. There is no CVA tenderness.   Musculoskeletal: Normal range of motion. She exhibits no edema, tenderness or deformity.   Lymphadenopathy:     She has no cervical adenopathy.   Neurological: She is alert, oriented to person, place, and time and easily aroused. No cranial nerve deficit. Coordination normal.   Skin: Skin is warm, dry and intact. No abrasion and no rash noted. She is not diaphoretic. No cyanosis or erythema. Nails show no clubbing.   Psychiatric: She has a normal mood and affect. Her speech is normal and behavior is normal.   Nursing note and vitals reviewed.      Assessment/Plan:    Jud was seen today for sore throat, cough and dizziness.    Diagnoses and all orders for this visit:    Sore throat  -     POC Rapid Strep A  Tylenol or ibuprofen as needed. Gargles as needed.  Cough  -     XR Chest 2 View  -     azithromycin (ZITHROMAX) 250 MG tablet; Take 2 tablets the first day, then 1 tablet daily for 4 days.  Continue albuterol as needed. Supplied with letter to avoid exposure to strong smells at work.      Return if symptoms worsen or fail to improve.

## 2018-12-14 ENCOUNTER — HOSPITAL ENCOUNTER (OUTPATIENT)
Dept: MAMMOGRAPHY | Facility: HOSPITAL | Age: 58
Discharge: HOME | End: 2018-12-14
Admitting: NURSE PRACTITIONER

## 2018-12-14 ENCOUNTER — HOSPITAL ENCOUNTER (OUTPATIENT)
Dept: ULTRASOUND IMAGING | Facility: HOSPITAL | Age: 58
Discharge: HOME | End: 2018-12-14

## 2018-12-14 ENCOUNTER — TRANSCRIBE ORDERS (OUTPATIENT)
Dept: MAMMOGRAPHY | Facility: HOSPITAL | Age: 58
End: 2018-12-14

## 2018-12-14 DIAGNOSIS — R92.8 ABNORMAL MAMMOGRAM: ICD-10-CM

## 2018-12-14 DIAGNOSIS — R92.8 ABNORMAL MAMMOGRAM: Primary | ICD-10-CM

## 2018-12-14 PROCEDURE — 76642 ULTRASOUND BREAST LIMITED: CPT

## 2018-12-14 PROCEDURE — 76642 ULTRASOUND BREAST LIMITED: CPT | Performed by: RADIOLOGY

## 2018-12-14 PROCEDURE — 77066 DX MAMMO INCL CAD BI: CPT | Performed by: RADIOLOGY

## 2018-12-14 PROCEDURE — G0279 TOMOSYNTHESIS, MAMMO: HCPCS

## 2018-12-14 PROCEDURE — 77062 BREAST TOMOSYNTHESIS BI: CPT | Performed by: RADIOLOGY

## 2018-12-14 PROCEDURE — 77066 DX MAMMO INCL CAD BI: CPT

## 2018-12-21 ENCOUNTER — OFFICE VISIT (OUTPATIENT)
Dept: INTERNAL MEDICINE | Facility: CLINIC | Age: 58
End: 2018-12-21

## 2018-12-21 ENCOUNTER — HOSPITAL ENCOUNTER (OUTPATIENT)
Dept: GENERAL RADIOLOGY | Facility: HOSPITAL | Age: 58
Discharge: HOME | End: 2018-12-21
Admitting: NURSE PRACTITIONER

## 2018-12-21 VITALS
DIASTOLIC BLOOD PRESSURE: 80 MMHG | HEART RATE: 87 BPM | BODY MASS INDEX: 20.4 KG/M2 | HEIGHT: 67 IN | RESPIRATION RATE: 18 BRPM | WEIGHT: 130 LBS | TEMPERATURE: 98.6 F | SYSTOLIC BLOOD PRESSURE: 110 MMHG

## 2018-12-21 DIAGNOSIS — R06.02 SHORTNESS OF BREATH: ICD-10-CM

## 2018-12-21 DIAGNOSIS — J45.901 ASTHMA EXACERBATION, MILD: ICD-10-CM

## 2018-12-21 DIAGNOSIS — R05.9 COUGH: Primary | ICD-10-CM

## 2018-12-21 DIAGNOSIS — J45.20 MILD INTERMITTENT ASTHMA, UNSPECIFIED WHETHER COMPLICATED: ICD-10-CM

## 2018-12-21 PROBLEM — J18.9 PNEUMONIA OF BOTH LUNGS DUE TO INFECTIOUS ORGANISM: Status: RESOLVED | Noted: 2018-03-17 | Resolved: 2018-12-21

## 2018-12-21 PROCEDURE — 71046 X-RAY EXAM CHEST 2 VIEWS: CPT

## 2018-12-21 PROCEDURE — 96372 THER/PROPH/DIAG INJ SC/IM: CPT | Performed by: NURSE PRACTITIONER

## 2018-12-21 PROCEDURE — 99214 OFFICE O/P EST MOD 30 MIN: CPT | Performed by: NURSE PRACTITIONER

## 2018-12-21 PROCEDURE — 94640 AIRWAY INHALATION TREATMENT: CPT | Performed by: NURSE PRACTITIONER

## 2018-12-21 RX ORDER — PREDNISONE 20 MG/1
20 TABLET ORAL 2 TIMES DAILY
Qty: 10 TABLET | Refills: 0 | Status: SHIPPED | OUTPATIENT
Start: 2018-12-21 | End: 2018-12-26

## 2018-12-21 RX ORDER — ALBUTEROL SULFATE 2.5 MG/3ML
2.5 SOLUTION RESPIRATORY (INHALATION) EVERY 4 HOURS PRN
Qty: 25 VIAL | Refills: 12 | OUTPATIENT
Start: 2018-12-21 | End: 2019-09-04

## 2018-12-21 RX ORDER — ALBUTEROL SULFATE 2.5 MG/3ML
2.5 SOLUTION RESPIRATORY (INHALATION) EVERY 6 HOURS PRN
Status: DISCONTINUED | OUTPATIENT
Start: 2018-12-21 | End: 2019-03-22

## 2018-12-21 RX ORDER — AZITHROMYCIN 250 MG/1
TABLET, FILM COATED ORAL
Qty: 6 TABLET | Refills: 0 | Status: SHIPPED | OUTPATIENT
Start: 2018-12-21 | End: 2018-12-26

## 2018-12-21 RX ORDER — DEXAMETHASONE SODIUM PHOSPHATE 4 MG/ML
4 INJECTION, SOLUTION INTRA-ARTICULAR; INTRALESIONAL; INTRAMUSCULAR; INTRAVENOUS; SOFT TISSUE ONCE
Status: COMPLETED | OUTPATIENT
Start: 2018-12-21 | End: 2018-12-21

## 2018-12-21 RX ORDER — ALBUTEROL SULFATE 90 UG/1
2 AEROSOL, METERED RESPIRATORY (INHALATION) EVERY 4 HOURS PRN
Qty: 1 INHALER | Refills: 0 | Status: SHIPPED | OUTPATIENT
Start: 2018-12-21

## 2018-12-21 RX ADMIN — ALBUTEROL SULFATE 2.5 MG: 2.5 SOLUTION RESPIRATORY (INHALATION) at 11:46

## 2018-12-21 RX ADMIN — DEXAMETHASONE SODIUM PHOSPHATE 4 MG: 4 INJECTION, SOLUTION INTRA-ARTICULAR; INTRALESIONAL; INTRAMUSCULAR; INTRAVENOUS; SOFT TISSUE at 11:46

## 2018-12-21 NOTE — PROGRESS NOTES
Subjective:    Jud Garnica is a 58 y.o. female.     Chief Complaint   Patient presents with   • Cough     cough, chest congestion x7 days        History of Present Illness   Patient complains of cough and chest congestion x 7 days. She states she had a fever of 101.0 at the [facility] this past Tuesday. Fever resolved with ibuprofen. She has shortness of breath with some improvement with albuterol and worsened with cough and activity. She has anterior chest pain with cough. She has had pneumonia in the past and states it feels like pneumonia again. She has a history of asthma that is exacerbated with illnesses, other wise controlled.She has used albuterol about three times per day since symptoms began.    Current Outpatient Medications:   •  albuterol sulfate  (90 Base) MCG/ACT inhaler, Inhale 2 puffs Every 4 (Four) Hours As Needed for Wheezing., Disp: 1 inhaler, Rfl: 0  •  montelukast (SINGULAIR) 10 MG tablet, Take 1 tablet by mouth Every Night., Disp: 30 tablet, Rfl: 11  •  Multiple Vitamins-Minerals (MULTIVITAMINS/MINERALS ADULT PO), Take  by mouth., Disp: , Rfl:   •  albuterol (PROVENTIL) (2.5 MG/3ML) 0.083% nebulizer solution, Take 2.5 mg by nebulization Every 4 (Four) Hours As Needed for Wheezing or Shortness of Air., Disp: 25 vial, Rfl: 12  •  azithromycin (ZITHROMAX Z-PAULO) 250 MG tablet, Take 2 tablets the first day, then 1 tablet daily for 4 days., Disp: 6 tablet, Rfl: 0  •  predniSONE (DELTASONE) 20 MG tablet, Take 1 tablet by mouth 2 (Two) Times a Day for 5 days., Disp: 10 tablet, Rfl: 0    Current Facility-Administered Medications:   •  albuterol (PROVENTIL) nebulizer solution 0.083% 2.5 mg/3mL, 2.5 mg, Nebulization, Q6H PRN, Felecia Santizo APRN, 2.5 mg at 12/21/18 1146     The following portions of the patient's history were reviewed and updated as appropriate: allergies, current medications, past family history, past medical history, past social history, past surgical history and problem  "list.    Review of Systems   Constitutional: Positive for fever. Negative for chills and fatigue.   HENT: Negative for congestion, ear pain, postnasal drip, rhinorrhea, sinus pressure, sneezing and sore throat.    Eyes: Negative for pain, discharge, redness and itching.   Respiratory: Positive for cough and shortness of breath. Negative for chest tightness and wheezing.         Asthma   Cardiovascular: Positive for chest pain.   Gastrointestinal: Negative for abdominal pain, diarrhea, nausea and vomiting.   Musculoskeletal: Negative for arthralgias and myalgias.   Skin: Negative for rash.   Allergic/Immunologic: Positive for environmental allergies.   Neurological: Negative for headaches.   Hematological: Negative for adenopathy.       Objective:    /80 (BP Location: Right arm, Patient Position: Sitting, Cuff Size: Adult)   Pulse 87   Temp 98.6 °F (37 °C)   Resp 18   Ht 170.2 cm (67\")   Wt 59 kg (130 lb)   BMI 20.36 kg/m²     Physical Exam   Constitutional: She is oriented to person, place, and time. She appears well-developed and well-nourished.  Non-toxic appearance. She does not have a sickly appearance. She does not appear ill. No distress.   HENT:   Head: Normocephalic and atraumatic.   Right Ear: Hearing, tympanic membrane, external ear and ear canal normal.   Left Ear: Hearing, tympanic membrane, external ear and ear canal normal.   Nose: Nose normal.   Mouth/Throat: Oropharynx is clear and moist and mucous membranes are normal. No oral lesions.   Tonsils normal.  No sinus tenderness to palpation.   Eyes: Lids are normal.   Neck: Normal range of motion. Neck supple.   Cardiovascular: Normal rate and regular rhythm.   No murmur heard.  Pulmonary/Chest: She has decreased breath sounds.   Mild increased effort with respirations. Lung sounds improved after Nebulizer treatment.   Lymphadenopathy:     She has no cervical adenopathy.   Neurological: She is alert and oriented to person, place, and time. "   Skin: Skin is warm, dry and intact. No rash noted.   Psychiatric: She has a normal mood and affect.   Nursing note and vitals reviewed.      Assessment/Plan:    Jud was seen today for cough.    Diagnoses and all orders for this visit:    Cough  -     XR Chest 2 View-discussed with patient chronic changes and no acute findings  -     Home Nebulizer  -     albuterol (PROVENTIL) nebulizer solution 0.083% 2.5 mg/3mL; Take 2.5 mg by nebulization Every 6 (Six) Hours As Needed for Shortness of Air.  -     azithromycin (ZITHROMAX Z-PAULO) 250 MG tablet; Take 2 tablets the first day, then 1 tablet daily for 4 days.  -     predniSONE (DELTASONE) 20 MG tablet; Take 1 tablet by mouth 2 (Two) Times a Day for 5 days.    Shortness of breath  -     XR Chest 2 View  -     Home Nebulizer  -     albuterol (PROVENTIL) nebulizer solution 0.083% 2.5 mg/3mL; Take 2.5 mg by nebulization Every 6 (Six) Hours As Needed for Shortness of Air.  -     azithromycin (ZITHROMAX Z-PAULO) 250 MG tablet; Take 2 tablets the first day, then 1 tablet daily for 4 days.  -     predniSONE (DELTASONE) 20 MG tablet; Take 1 tablet by mouth 2 (Two) Times a Day for 5 days.    Mild intermittent asthma, unspecified whether complicated  -     azithromycin (ZITHROMAX Z-PAULO) 250 MG tablet; Take 2 tablets the first day, then 1 tablet daily for 4 days.  -     predniSONE (DELTASONE) 20 MG tablet; Take 1 tablet by mouth 2 (Two) Times a Day for 5 days.    Asthma exacerbation, mild  -     dexamethasone (DECADRON) injection 4 mg; Infuse 1 mL into a venous catheter 1 (One) Time.  -     albuterol (PROVENTIL) (2.5 MG/3ML) 0.083% nebulizer solution; Take 2.5 mg by nebulization Every 4 (Four) Hours As Needed for Wheezing or Shortness of Air.  -     albuterol sulfate  (90 Base) MCG/ACT inhaler; Inhale 2 puffs Every 4 (Four) Hours As Needed for Wheezing.      Return if symptoms worsen or fail to improve.

## 2019-03-13 PROBLEM — Z77.098 OCCUPATIONAL EXPOSURE TO CHEMICALS: Status: ACTIVE | Noted: 2019-03-13

## 2019-03-22 ENCOUNTER — OFFICE VISIT (OUTPATIENT)
Dept: INTERNAL MEDICINE | Facility: CLINIC | Age: 59
End: 2019-03-22

## 2019-03-22 VITALS
RESPIRATION RATE: 18 BRPM | TEMPERATURE: 97.6 F | DIASTOLIC BLOOD PRESSURE: 66 MMHG | WEIGHT: 130.8 LBS | OXYGEN SATURATION: 98 % | BODY MASS INDEX: 22.45 KG/M2 | HEART RATE: 76 BPM | SYSTOLIC BLOOD PRESSURE: 110 MMHG

## 2019-03-22 DIAGNOSIS — J45.20 MILD INTERMITTENT ASTHMA, UNSPECIFIED WHETHER COMPLICATED: Primary | ICD-10-CM

## 2019-03-22 PROCEDURE — 99213 OFFICE O/P EST LOW 20 MIN: CPT | Performed by: NURSE PRACTITIONER

## 2019-03-22 NOTE — PROGRESS NOTES
Subjective:    Jud Garnica is a 58 y.o. female.     Chief Complaint   Patient presents with   • Follow-up     Asthma - 6 month    • Dizziness     Went to [facility] - 3/13       History of Present Illness   Patient here for follow up.     Patient states asthma is currently controlled with Singulair and albuterol inhaler and Nebulizer as needed. She uses albuterol about 2 times per week. She continues to look for another job that does not involve exposure to strong fumes/odors. She follows with pulmonologist.     Patient states she went to urgent care last Wednesday. She was nauseated and states blood pressure dropped. She states she was dehydrated and anemic and likely had a virus. She vomited later that day and this lasted for 48 hours and her grandson became sick with same virus. These symptoms have resolved.    Current Outpatient Medications:   •  albuterol (PROVENTIL) (2.5 MG/3ML) 0.083% nebulizer solution, Take 2.5 mg by nebulization Every 4 (Four) Hours As Needed for Wheezing or Shortness of Air., Disp: 25 vial, Rfl: 12  •  albuterol sulfate  (90 Base) MCG/ACT inhaler, Inhale 2 puffs Every 4 (Four) Hours As Needed for Wheezing., Disp: 1 inhaler, Rfl: 0  •  montelukast (SINGULAIR) 10 MG tablet, Take 1 tablet by mouth Every Night., Disp: 30 tablet, Rfl: 11  •  Multiple Vitamins-Minerals (MULTIVITAMINS/MINERALS ADULT PO), Take  by mouth., Disp: , Rfl:   No current facility-administered medications for this visit.      The following portions of the patient's history were reviewed and updated as appropriate: allergies, current medications, past family history, past medical history, past social history, past surgical history and problem list.    Review of Systems   Constitutional: Negative for chills, fatigue and fever.   HENT: Negative for congestion, dental problem, mouth sores, nosebleeds, postnasal drip, rhinorrhea, sinus pressure, sinus pain, sneezing and sore throat.    Eyes: Negative for pain,  discharge, redness and itching.   Respiratory: Negative for cough, shortness of breath and wheezing.         Asthma   Cardiovascular: Negative for chest pain, palpitations and leg swelling.        No BROOKS, orthopnea, PND, or claudication.   Gastrointestinal: Negative for abdominal distention, abdominal pain, blood in stool, diarrhea, nausea and vomiting.        Recent GI viral illness   Endocrine: Negative for cold intolerance, heat intolerance, polydipsia and polyuria.   Genitourinary: Negative for difficulty urinating, dysuria, frequency, hematuria and urgency.   Musculoskeletal: Negative for arthralgias, gait problem, joint swelling and myalgias.   Skin: Negative for color change, rash and wound.   Allergic/Immunologic: Positive for environmental allergies.   Neurological: Negative for dizziness, syncope, weakness, light-headedness, numbness and headaches.   Hematological: Negative for adenopathy. Does not bruise/bleed easily.       Objective:    /66   Pulse 76   Temp 97.6 °F (36.4 °C) (Temporal)   Resp 18   Wt 59.3 kg (130 lb 12.8 oz)   SpO2 98%   BMI 22.45 kg/m²     Physical Exam   Constitutional: She is oriented to person, place, and time. She appears well-developed and well-nourished. She is active.  Non-toxic appearance. She does not have a sickly appearance. She does not appear ill. No distress.   HENT:   Head: Normocephalic and atraumatic.   Right Ear: Tympanic membrane, external ear and ear canal normal.   Left Ear: Tympanic membrane, external ear and ear canal normal.   Nose: Nose normal.   Mouth/Throat: Uvula is midline, oropharynx is clear and moist and mucous membranes are normal.   Eyes: Conjunctivae and lids are normal.   Neck: Normal range of motion. Neck supple. Carotid bruit is not present. No thyromegaly present.   Cardiovascular: Normal rate, regular rhythm and normal heart sounds. Exam reveals no gallop and no friction rub.   No murmur heard.  Pulmonary/Chest: Effort normal and  breath sounds normal.   Abdominal: Soft. Bowel sounds are normal. She exhibits no distension, no abdominal bruit and no mass. There is no hepatosplenomegaly. There is no tenderness.   Lymphadenopathy:     She has no cervical adenopathy.   Neurological: She is alert and oriented to person, place, and time.   Psychiatric: She has a normal mood and affect.   Nursing note and vitals reviewed.      Assessment/Plan:    Jud was seen today for follow-up and dizziness.    Diagnoses and all orders for this visit:    Mild intermittent asthma, unspecified whether complicated    Continue Singulair and albuterol and pulmonology follow.    Return if symptoms worsen or fail to improve.

## 2019-06-21 ENCOUNTER — OFFICE VISIT (OUTPATIENT)
Dept: INTERNAL MEDICINE | Facility: CLINIC | Age: 59
End: 2019-06-21

## 2019-06-21 ENCOUNTER — HOSPITAL ENCOUNTER (OUTPATIENT)
Dept: MAMMOGRAPHY | Facility: HOSPITAL | Age: 59
Discharge: HOME | End: 2019-06-21
Admitting: NURSE PRACTITIONER

## 2019-06-21 VITALS
DIASTOLIC BLOOD PRESSURE: 60 MMHG | BODY MASS INDEX: 21.75 KG/M2 | HEIGHT: 64 IN | WEIGHT: 127.4 LBS | RESPIRATION RATE: 16 BRPM | HEART RATE: 85 BPM | OXYGEN SATURATION: 98 % | TEMPERATURE: 98.1 F | SYSTOLIC BLOOD PRESSURE: 108 MMHG

## 2019-06-21 DIAGNOSIS — J06.9 ACUTE URI: Primary | ICD-10-CM

## 2019-06-21 DIAGNOSIS — R92.8 ABNORMAL MAMMOGRAM: ICD-10-CM

## 2019-06-21 PROCEDURE — 99213 OFFICE O/P EST LOW 20 MIN: CPT | Performed by: PHYSICIAN ASSISTANT

## 2019-06-21 PROCEDURE — G0279 TOMOSYNTHESIS, MAMMO: HCPCS

## 2019-06-21 PROCEDURE — 77066 DX MAMMO INCL CAD BI: CPT

## 2019-06-21 PROCEDURE — 77062 BREAST TOMOSYNTHESIS BI: CPT | Performed by: RADIOLOGY

## 2019-06-21 PROCEDURE — 77066 DX MAMMO INCL CAD BI: CPT | Performed by: RADIOLOGY

## 2019-06-21 RX ORDER — AZITHROMYCIN 250 MG/1
TABLET, FILM COATED ORAL
Qty: 6 TABLET | Refills: 0 | OUTPATIENT
Start: 2019-06-21 | End: 2019-09-04

## 2019-06-21 NOTE — PROGRESS NOTES
Chief Complaint   Patient presents with   • Chills     x2 months, intermittent, chest tight, and back up, upper back, cough,headaches, shortness of breathe       Subjective       History of Present Illness     Jud Garnica is a 58 y.o. female. She presents with 2 month history of chest cold. Pt states her symptoms have mildly improved in the last week, but continues to have chest congestion, productive cough with clear mucous, nasal congestion with green production, and SOA and wheezing secondary to asthma. Also c/o HA. She has chest tightness, and reports occasional sharp pains at mid upper back which have occurred several times since beginning of chest cold, and she has not had this issue in the past. These resolve in less than a minute. She initially had fever and chills for the first week of the chest cold, but this resolved more than a month ago and no further fever or chills. Her cough has also improved. She is using her inhaler which improves wheezing and SOA. She has not used other meds for this issue. She does have a hx pneumonia, 2 episodes last year alone. Also notes FHx lung cancer in father and grandmother.       The following portions of the patient's history were reviewed and updated as appropriate: allergies, current medications, past medical history, past social history and problem list.    Allergies   Allergen Reactions   • Codeine Irritability   • Nyquil Multi-Symptom [Pseudoeph-Doxylamine-Dm-Apap] Irritability     Social History     Tobacco Use   • Smoking status: Never Smoker   • Smokeless tobacco: Never Used   Substance Use Topics   • Alcohol use: No         Current Outpatient Medications:   •  albuterol (PROVENTIL) (2.5 MG/3ML) 0.083% nebulizer solution, Take 2.5 mg by nebulization Every 4 (Four) Hours As Needed for Wheezing or Shortness of Air., Disp: 25 vial, Rfl: 12  •  albuterol sulfate  (90 Base) MCG/ACT inhaler, Inhale 2 puffs Every 4 (Four) Hours As Needed for Wheezing., Disp:  1 inhaler, Rfl: 0  •  Multiple Vitamins-Minerals (MULTIVITAMINS/MINERALS ADULT PO), Take  by mouth., Disp: , Rfl:   •  azithromycin (ZITHROMAX) 250 MG tablet, Take 2 tablets the first day, then 1 tablet daily for 4 days., Disp: 6 tablet, Rfl: 0  •  montelukast (SINGULAIR) 10 MG tablet, Take 1 tablet by mouth Every Night., Disp: 30 tablet, Rfl: 11    Review of Systems   Constitutional: Positive for fatigue. Negative for chills and fever.   HENT: Positive for congestion and postnasal drip. Negative for ear pain, sore throat and trouble swallowing.    Eyes: Negative for pain.   Respiratory: Positive for cough, chest tightness, shortness of breath and wheezing.    Cardiovascular: Negative for chest pain and palpitations.   Gastrointestinal: Negative for abdominal pain, nausea and vomiting.   Genitourinary: Negative for dysuria.   Musculoskeletal: Positive for myalgias.   Skin: Negative for rash.   Neurological: Positive for headache. Negative for dizziness and weakness.       Objective   Vitals:    06/21/19 0834   BP: 108/60   Pulse: 85   Resp: 16   Temp: 98.1 °F (36.7 °C)   SpO2: 98%     Physical Exam   Constitutional: She appears well-developed and well-nourished.   HENT:   Head: Normocephalic and atraumatic.   Right Ear: Tympanic membrane, external ear and ear canal normal.   Left Ear: Tympanic membrane, external ear and ear canal normal.   Mouth/Throat: Oropharynx is clear and moist and mucous membranes are normal.   Eyes: Conjunctivae are normal.   Neck: Normal range of motion. Neck supple.   Cardiovascular: Normal rate and regular rhythm.   No murmur heard.  Pulmonary/Chest: Effort normal and breath sounds normal. She has no wheezes. She has no rales.   Lymphadenopathy:     She has no cervical adenopathy.   Psychiatric: She has a normal mood and affect. Her behavior is normal.         Assessment/Plan   Jud was seen today for chills.    Diagnoses and all orders for this visit:    Acute URI  -     azithromycin  (ZITHROMAX) 250 MG tablet; Take 2 tablets the first day, then 1 tablet daily for 4 days.      Finish all Abx through completion.  Will consider CXR if not improved in 1-2 weeks, but lungs clear and normal exam today.          Return if symptoms worsen or fail to improve.

## 2019-09-04 ENCOUNTER — APPOINTMENT (OUTPATIENT)
Dept: GENERAL RADIOLOGY | Facility: HOSPITAL | Age: 59
End: 2019-09-04

## 2019-09-04 ENCOUNTER — HOSPITAL ENCOUNTER (EMERGENCY)
Facility: HOSPITAL | Age: 59
Discharge: HOME | End: 2019-09-04
Attending: EMERGENCY MEDICINE | Admitting: EMERGENCY MEDICINE

## 2019-09-04 VITALS
HEART RATE: 107 BPM | HEIGHT: 66 IN | BODY MASS INDEX: 20.57 KG/M2 | RESPIRATION RATE: 18 BRPM | DIASTOLIC BLOOD PRESSURE: 57 MMHG | WEIGHT: 128 LBS | OXYGEN SATURATION: 93 % | TEMPERATURE: 98.5 F | SYSTOLIC BLOOD PRESSURE: 106 MMHG

## 2019-09-04 DIAGNOSIS — J45.41 MODERATE PERSISTENT ASTHMA WITH EXACERBATION: Primary | ICD-10-CM

## 2019-09-04 DIAGNOSIS — J40 BRONCHITIS: ICD-10-CM

## 2019-09-04 LAB
ALBUMIN SERPL-MCNC: 4.2 G/DL (ref 3.5–5.2)
ALBUMIN/GLOB SERPL: 1.1 G/DL
ALP SERPL-CCNC: 78 U/L (ref 39–117)
ALT SERPL W P-5'-P-CCNC: 14 U/L (ref 1–33)
ANION GAP SERPL CALCULATED.3IONS-SCNC: 13 MMOL/L (ref 5–15)
AST SERPL-CCNC: 23 U/L (ref 1–32)
BASOPHILS # BLD AUTO: 0.05 10*3/MM3 (ref 0–0.2)
BASOPHILS NFR BLD AUTO: 0.5 % (ref 0–1.5)
BILIRUB SERPL-MCNC: 1.5 MG/DL (ref 0.2–1.2)
BUN BLD-MCNC: 11 MG/DL (ref 6–20)
BUN/CREAT SERPL: 13.8 (ref 7–25)
CALCIUM SPEC-SCNC: 9.6 MG/DL (ref 8.6–10.5)
CHLORIDE SERPL-SCNC: 102 MMOL/L (ref 98–107)
CO2 SERPL-SCNC: 24 MMOL/L (ref 22–29)
CREAT BLD-MCNC: 0.8 MG/DL (ref 0.57–1)
DEPRECATED RDW RBC AUTO: 44.3 FL (ref 37–54)
EOSINOPHIL # BLD AUTO: 0.06 10*3/MM3 (ref 0–0.4)
EOSINOPHIL NFR BLD AUTO: 0.6 % (ref 0.3–6.2)
ERYTHROCYTE [DISTWIDTH] IN BLOOD BY AUTOMATED COUNT: 14.6 % (ref 12.3–15.4)
GFR SERPL CREATININE-BSD FRML MDRD: 73 ML/MIN/1.73
GLOBULIN UR ELPH-MCNC: 3.7 GM/DL
GLUCOSE BLD-MCNC: 94 MG/DL (ref 65–99)
HCT VFR BLD AUTO: 38.8 % (ref 34–46.6)
HGB BLD-MCNC: 12.9 G/DL (ref 12–15.9)
HOLD SPECIMEN: NORMAL
HOLD SPECIMEN: NORMAL
IMM GRANULOCYTES # BLD AUTO: 0.04 10*3/MM3 (ref 0–0.05)
IMM GRANULOCYTES NFR BLD AUTO: 0.4 % (ref 0–0.5)
LIPASE SERPL-CCNC: 27 U/L (ref 13–60)
LYMPHOCYTES # BLD AUTO: 1.36 10*3/MM3 (ref 0.7–3.1)
LYMPHOCYTES NFR BLD AUTO: 13.9 % (ref 19.6–45.3)
MCH RBC QN AUTO: 29.1 PG (ref 26.6–33)
MCHC RBC AUTO-ENTMCNC: 33.2 G/DL (ref 31.5–35.7)
MCV RBC AUTO: 87.6 FL (ref 79–97)
MONOCYTES # BLD AUTO: 0.88 10*3/MM3 (ref 0.1–0.9)
MONOCYTES NFR BLD AUTO: 9 % (ref 5–12)
NEUTROPHILS # BLD AUTO: 7.4 10*3/MM3 (ref 1.7–7)
NEUTROPHILS NFR BLD AUTO: 75.6 % (ref 42.7–76)
NRBC BLD AUTO-RTO: 0 /100 WBC (ref 0–0.2)
NT-PROBNP SERPL-MCNC: 177.1 PG/ML (ref 5–900)
PLATELET # BLD AUTO: 235 10*3/MM3 (ref 140–450)
PMV BLD AUTO: 9.5 FL (ref 6–12)
POTASSIUM BLD-SCNC: 4.1 MMOL/L (ref 3.5–5.2)
PROT SERPL-MCNC: 7.9 G/DL (ref 6–8.5)
RBC # BLD AUTO: 4.43 10*6/MM3 (ref 3.77–5.28)
SODIUM BLD-SCNC: 139 MMOL/L (ref 136–145)
TROPONIN T SERPL-MCNC: <0.01 NG/ML (ref 0–0.03)
WBC NRBC COR # BLD: 9.79 10*3/MM3 (ref 3.4–10.8)
WHOLE BLOOD HOLD SPECIMEN: NORMAL
WHOLE BLOOD HOLD SPECIMEN: NORMAL

## 2019-09-04 PROCEDURE — 71045 X-RAY EXAM CHEST 1 VIEW: CPT

## 2019-09-04 PROCEDURE — 85025 COMPLETE CBC W/AUTO DIFF WBC: CPT | Performed by: EMERGENCY MEDICINE

## 2019-09-04 PROCEDURE — 83880 ASSAY OF NATRIURETIC PEPTIDE: CPT | Performed by: EMERGENCY MEDICINE

## 2019-09-04 PROCEDURE — 93005 ELECTROCARDIOGRAM TRACING: CPT | Performed by: EMERGENCY MEDICINE

## 2019-09-04 PROCEDURE — 25010000002 METHYLPREDNISOLONE PER 40 MG: Performed by: EMERGENCY MEDICINE

## 2019-09-04 PROCEDURE — 96374 THER/PROPH/DIAG INJ IV PUSH: CPT

## 2019-09-04 PROCEDURE — 83690 ASSAY OF LIPASE: CPT | Performed by: EMERGENCY MEDICINE

## 2019-09-04 PROCEDURE — 94640 AIRWAY INHALATION TREATMENT: CPT

## 2019-09-04 PROCEDURE — 99284 EMERGENCY DEPT VISIT MOD MDM: CPT

## 2019-09-04 PROCEDURE — 80053 COMPREHEN METABOLIC PANEL: CPT | Performed by: EMERGENCY MEDICINE

## 2019-09-04 PROCEDURE — 84484 ASSAY OF TROPONIN QUANT: CPT | Performed by: EMERGENCY MEDICINE

## 2019-09-04 PROCEDURE — 93005 ELECTROCARDIOGRAM TRACING: CPT

## 2019-09-04 RX ORDER — SODIUM CHLORIDE 0.9 % (FLUSH) 0.9 %
10 SYRINGE (ML) INJECTION AS NEEDED
Status: DISCONTINUED | OUTPATIENT
Start: 2019-09-04 | End: 2019-09-04 | Stop reason: HOSPADM

## 2019-09-04 RX ORDER — IPRATROPIUM BROMIDE AND ALBUTEROL SULFATE 2.5; .5 MG/3ML; MG/3ML
3 SOLUTION RESPIRATORY (INHALATION) ONCE
Status: COMPLETED | OUTPATIENT
Start: 2019-09-04 | End: 2019-09-04

## 2019-09-04 RX ORDER — DOXYCYCLINE 100 MG/1
100 CAPSULE ORAL 2 TIMES DAILY
Qty: 14 CAPSULE | Refills: 0 | Status: SHIPPED | OUTPATIENT
Start: 2019-09-04 | End: 2019-09-20

## 2019-09-04 RX ORDER — ASPIRIN 81 MG/1
324 TABLET, CHEWABLE ORAL ONCE
Status: DISCONTINUED | OUTPATIENT
Start: 2019-09-04 | End: 2019-09-04

## 2019-09-04 RX ORDER — PREDNISONE 20 MG/1
20 TABLET ORAL 2 TIMES DAILY
Qty: 10 TABLET | Refills: 0 | Status: SHIPPED | OUTPATIENT
Start: 2019-09-04 | End: 2019-09-11

## 2019-09-04 RX ORDER — IPRATROPIUM BROMIDE AND ALBUTEROL SULFATE 2.5; .5 MG/3ML; MG/3ML
3 SOLUTION RESPIRATORY (INHALATION)
Qty: 360 ML | Refills: 0 | Status: SHIPPED | OUTPATIENT
Start: 2019-09-04

## 2019-09-04 RX ORDER — METHYLPREDNISOLONE SODIUM SUCCINATE 40 MG/ML
80 INJECTION, POWDER, LYOPHILIZED, FOR SOLUTION INTRAMUSCULAR; INTRAVENOUS ONCE
Status: COMPLETED | OUTPATIENT
Start: 2019-09-04 | End: 2019-09-04

## 2019-09-04 RX ADMIN — METHYLPREDNISOLONE SODIUM SUCCINATE 80 MG: 40 INJECTION, POWDER, FOR SOLUTION INTRAMUSCULAR; INTRAVENOUS at 13:39

## 2019-09-04 RX ADMIN — IPRATROPIUM BROMIDE AND ALBUTEROL SULFATE 3 ML: 2.5; .5 SOLUTION RESPIRATORY (INHALATION) at 12:22

## 2019-09-04 NOTE — ED PROVIDER NOTES
Subjective   Jud Garnica is a 58 y.o female who presents to the ED with complaints of shortness of breath. She reports she began experiencing shortness of breath with an onset two weeks ago. She states her symptoms have worsened in the last two days. She has a past medical history of asthma and tried using her albuterol inhaler but states it was ineffective. She also complains of cough, rhinorrhea, and headache. She denies fever, congestion, sneezing, abdominal pain, leg swelling, and urinary symptoms. She is a non-smoker. There are no other acute symptoms at this time.        History provided by:  Patient  Shortness of Breath   Severity:  Moderate  Onset quality:  Sudden  Duration:  2 weeks  Timing:  Constant  Progression:  Worsening  Ineffective treatments:  Inhaler  Associated symptoms: cough and headaches    Associated symptoms: no abdominal pain and no fever    Cough:     Cough characteristics:  Productive    Sputum characteristics:  Green    Severity:  Moderate    Onset quality:  Sudden    Timing:  Constant    Progression:  Unchanged    Chronicity:  New  Headaches:     Severity:  Moderate    Onset quality:  Sudden    Timing:  Constant    Progression:  Unchanged    Chronicity:  New      Review of Systems   Constitutional: Negative for fever.   HENT: Positive for rhinorrhea. Negative for congestion and sneezing.    Respiratory: Positive for cough and shortness of breath.    Cardiovascular: Negative for leg swelling.   Gastrointestinal: Negative for abdominal pain.   Genitourinary: Negative for difficulty urinating, dysuria, frequency and urgency.   Neurological: Positive for headaches.   All other systems reviewed and are negative.      Past Medical History:   Diagnosis Date   • Asthma    • Pneumonia 2018   • Uterine cancer (CMS/[facility])        Allergies   Allergen Reactions   • Codeine Irritability   • Nyquil Multi-Symptom [Pseudoeph-Doxylamine-Dm-Apap] Irritability       Past Surgical History:   Procedure  Laterality Date   • BREAST BIOPSY Right 06/08/2018    US bx   • HYSTERECTOMY      age 29       Family History   Problem Relation Age of Onset   • Dementia Mother    • Cancer Father    • Heart attack Maternal Grandfather    • Anuerysm Maternal Grandfather    • Heart attack Paternal Grandmother    • Cancer Paternal Grandmother    • Dementia Paternal Grandfather    • Breast cancer Neg Hx    • Ovarian cancer Neg Hx        Social History     Socioeconomic History   • Marital status:      Spouse name: Not on file   • Number of children: Not on file   • Years of education: Not on file   • Highest education level: Not on file   Tobacco Use   • Smoking status: Never Smoker   • Smokeless tobacco: Never Used   Substance and Sexual Activity   • Alcohol use: No   • Drug use: No   • Sexual activity: Defer         Objective   Physical Exam   Constitutional: She is oriented to person, place, and time. She appears well-developed and well-nourished. No distress.   HENT:   Head: Normocephalic and atraumatic.   Nose: Nose normal.   Pharynx benign. Airway patent.   Eyes: Conjunctivae are normal. No scleral icterus.   Neck: Normal range of motion. Neck supple.   Cardiovascular: Normal rate, regular rhythm and normal heart sounds.   No murmur heard.  Pulmonary/Chest: Effort normal. No respiratory distress. She has no wheezes. She has rhonchi.   Some delay in expiratory phase. Diffuse rhonchi.   Abdominal: Soft. There is no tenderness.   Musculoskeletal: Normal range of motion. She exhibits no edema.   Lymphadenopathy:     She has no cervical adenopathy.   Neurological: She is alert and oriented to person, place, and time.   Skin: Skin is warm and dry.   Psychiatric: She has a normal mood and affect. Her behavior is normal.   Nursing note and vitals reviewed.      Procedures         ED Course  ED Course as of Sep 04 2118   Wed Sep 04, 2019   1319 Peak flows about 1/3rd of predicted pre and post neb despite good effort.  Awaiting  labs to complete evaluation.  [LI]   1357 Continues to have acceptable O2 sats.  Voice normal, no stridor, but has sensation throat is closing.  However, no evidence on exam for that.  Will maximize outpatient treatment.  [LI]      ED Course User Index  [LI] Mejia Carranza MD         Final Diagnosis: as of Sep 04 2118   Moderate persistent asthma with exacerbation   Bronchitis     Recent Results (from the past 24 hour(s))   Light Blue Top    Collection Time: 09/04/19 12:15 PM   Result Value Ref Range    Extra Tube hold for add-on    Lavender Top    Collection Time: 09/04/19 12:15 PM   Result Value Ref Range    Extra Tube hold for add-on    CBC Auto Differential    Collection Time: 09/04/19 12:15 PM   Result Value Ref Range    WBC 9.79 3.40 - 10.80 10*3/mm3    RBC 4.43 3.77 - 5.28 10*6/mm3    Hemoglobin 12.9 12.0 - 15.9 g/dL    Hematocrit 38.8 34.0 - 46.6 %    MCV 87.6 79.0 - 97.0 fL    MCH 29.1 26.6 - 33.0 pg    MCHC 33.2 31.5 - 35.7 g/dL    RDW 14.6 12.3 - 15.4 %    RDW-SD 44.3 37.0 - 54.0 fl    MPV 9.5 6.0 - 12.0 fL    Platelets 235 140 - 450 10*3/mm3    Neutrophil % 75.6 42.7 - 76.0 %    Lymphocyte % 13.9 (L) 19.6 - 45.3 %    Monocyte % 9.0 5.0 - 12.0 %    Eosinophil % 0.6 0.3 - 6.2 %    Basophil % 0.5 0.0 - 1.5 %    Immature Grans % 0.4 0.0 - 0.5 %    Neutrophils, Absolute 7.40 (H) 1.70 - 7.00 10*3/mm3    Lymphocytes, Absolute 1.36 0.70 - 3.10 10*3/mm3    Monocytes, Absolute 0.88 0.10 - 0.90 10*3/mm3    Eosinophils, Absolute 0.06 0.00 - 0.40 10*3/mm3    Basophils, Absolute 0.05 0.00 - 0.20 10*3/mm3    Immature Grans, Absolute 0.04 0.00 - 0.05 10*3/mm3    nRBC 0.0 0.0 - 0.2 /100 WBC   Troponin    Collection Time: 09/04/19  1:10 PM   Result Value Ref Range    Troponin T <0.010 0.000 - 0.030 ng/mL   Comprehensive Metabolic Panel    Collection Time: 09/04/19  1:10 PM   Result Value Ref Range    Glucose 94 65 - 99 mg/dL    BUN 11 6 - 20 mg/dL    Creatinine 0.80 0.57 - 1.00 mg/dL    Sodium 139 136 - 145 mmol/L     Potassium 4.1 3.5 - 5.2 mmol/L    Chloride 102 98 - 107 mmol/L    CO2 24.0 22.0 - 29.0 mmol/L    Calcium 9.6 8.6 - 10.5 mg/dL    Total Protein 7.9 6.0 - 8.5 g/dL    Albumin 4.20 3.50 - 5.20 g/dL    ALT (SGPT) 14 1 - 33 U/L    AST (SGOT) 23 1 - 32 U/L    Alkaline Phosphatase 78 39 - 117 U/L    Total Bilirubin 1.5 (H) 0.2 - 1.2 mg/dL    eGFR Non African Amer 73 >60 mL/min/1.73    Globulin 3.7 gm/dL    A/G Ratio 1.1 g/dL    BUN/Creatinine Ratio 13.8 7.0 - 25.0    Anion Gap 13.0 5.0 - 15.0 mmol/L   Lipase    Collection Time: 09/04/19  1:10 PM   Result Value Ref Range    Lipase 27 13 - 60 U/L   BNP    Collection Time: 09/04/19  1:10 PM   Result Value Ref Range    proBNP 177.1 5.0 - 900.0 pg/mL   Green Top (Gel)    Collection Time: 09/04/19  1:10 PM   Result Value Ref Range    Extra Tube Hold for add-ons.    Gold Top - SST    Collection Time: 09/04/19  1:10 PM   Result Value Ref Range    Extra Tube Hold for add-ons.      Note: In addition to lab results from this visit, the labs listed above may include labs taken at another facility or during a different encounter within the last 24 hours. Please correlate lab times with ED admission and discharge times for further clarification of the services performed during this visit.    XR Chest 1 View   Final Result   Chronic change; no acute findings.       D:  09/04/2019   E:  09/04/2019       This report was finalized on 9/4/2019 2:20 PM by Dr. Ken Lopez MD.            Vitals:    09/04/19 1331 09/04/19 1359 09/04/19 1400 09/04/19 1411   BP:   106/57    Pulse: 111 110 107    Resp:    18   Temp:    98.5 °F (36.9 °C)   TempSrc:    Oral   SpO2: 93% 94% 93%    Weight:       Height:         Medications   ipratropium-albuterol (DUO-NEB) nebulizer solution 3 mL (3 mL Nebulization Given 9/4/19 1222)   methylPREDNISolone sodium succinate (SOLU-Medrol) injection 80 mg (80 mg Intravenous Given 9/4/19 1339)     ECG/EMG Results (last 24 hours)     Procedure Component Value Units Date/Time     ECG 12 Lead [[id number]] Collected:  09/04/19 1102     Updated:  09/04/19 1118    Narrative:       Test Reason : chest pain  Blood Pressure : **/** mmHG  Vent. Rate : 106 BPM     Atrial Rate : 106 BPM     P-R Int : 148 ms          QRS Dur : 080 ms      QT Int : 320 ms       P-R-T Axes : 000 145 137 degrees     QTc Int : 425 ms    Sinus tachycardia  Left posterior fascicular block  Minimal voltage criteria for LVH, may be normal variant  Abnormal ECG  When compared with ECG of 17-MAR-2018 15:04,  No significant change was found  Confirmed by YULIANA CARRANZA MD ([id number]) on 9/4/2019 11:17:58 AM    Referred By:  edmd           Confirmed By:YULIANA CARRANZA MD        ECG 12 Lead   Final Result   Test Reason : chest pain   Blood Pressure : **/** mmHG   Vent. Rate : 106 BPM     Atrial Rate : 106 BPM      P-R Int : 148 ms          QRS Dur : 080 ms       QT Int : 320 ms       P-R-T Axes : 000 145 137 degrees      QTc Int : 425 ms      Sinus tachycardia   Left posterior fascicular block   Minimal voltage criteria for LVH, may be normal variant   Abnormal ECG   When compared with ECG of 17-MAR-2018 15:04,   No significant change was found   Confirmed by YULIANA CARRANZA MD ([id number]) on 9/4/2019 11:17:58 AM      Referred By:  edmd           Confirmed By:YULIANA CARRANZA MD                        [facility]    Final diagnoses:   Moderate persistent asthma with exacerbation   Bronchitis       Documentation assistance provided by jocelyn Pugh.  Information recorded by the scribe was done at my direction and has been verified and validated by me.     Joshua Pugh  09/04/19 1633       Yuliana Carranza MD  09/04/19 3309

## 2019-09-04 NOTE — DISCHARGE INSTRUCTIONS
Please call to schedule a follow-up appointment with Felecia Santizo in two days if you are not much better.    Start the doxycycline today and the prednisone tomorrow.    Immediately return to the emergency department for any new or worsening symptoms.

## 2019-09-06 ENCOUNTER — OFFICE VISIT (OUTPATIENT)
Dept: INTERNAL MEDICINE | Facility: CLINIC | Age: 59
End: 2019-09-06

## 2019-09-06 VITALS
HEART RATE: 88 BPM | WEIGHT: 124 LBS | OXYGEN SATURATION: 94 % | DIASTOLIC BLOOD PRESSURE: 60 MMHG | SYSTOLIC BLOOD PRESSURE: 102 MMHG | BODY MASS INDEX: 20.01 KG/M2 | RESPIRATION RATE: 16 BRPM | TEMPERATURE: 98.1 F

## 2019-09-06 DIAGNOSIS — J45.41 MODERATE PERSISTENT ASTHMA WITH ACUTE EXACERBATION: Primary | ICD-10-CM

## 2019-09-06 PROCEDURE — 99213 OFFICE O/P EST LOW 20 MIN: CPT | Performed by: NURSE PRACTITIONER

## 2019-09-06 NOTE — PROGRESS NOTES
Subjective:    Jud Garnica is a 58 y.o. female.     Chief Complaint   Patient presents with   • Follow-up     Bronchitis       History of Present Illness   Patient present for follow up after emergency room visit on 9/4/2019 for asthma exacerbation. Patient reports she has pulmonology follow up   9/20/2019. Patient has history of asthma that is worsened by occupational exposures and she continues to look for different employment. Chest x ray at emergency room did not show any acute processes and shows chronic changes. She has had pneumonia in the past-6/21/2018. Patient currently taking antibiotic, Nebulizer treatments and oral steroids. She is anxious about falling asleep and fears she may stop breathing during sleep. No fever reported. She has decreased endurance and shortness of breath. She is off work until Monday and is trying to increase rest periods.     Current Outpatient Medications:   •  albuterol sulfate  (90 Base) MCG/ACT inhaler, Inhale 2 puffs Every 4 (Four) Hours As Needed for Wheezing., Disp: 1 inhaler, Rfl: 0  •  doxycycline (MONODOX) 100 MG capsule, Take 1 capsule by mouth 2 (Two) Times a Day., Disp: 14 capsule, Rfl: 0  •  ipratropium-albuterol (DUO-NEB) 0.5-2.5 mg/3 ml nebulizer, Take 3 mL by nebulization 4 (Four) Times a Day., Disp: 360 mL, Rfl: 0  •  montelukast (SINGULAIR) 10 MG tablet, Take 1 tablet by mouth Every Night., Disp: 30 tablet, Rfl: 11  •  Multiple Vitamins-Minerals (MULTIVITAMINS/MINERALS ADULT PO), Take  by mouth., Disp: , Rfl:   •  predniSONE (DELTASONE) 20 MG tablet, Take 1 tablet by mouth 2 (Two) Times a Day., Disp: 10 tablet, Rfl: 0  •  beclomethasone (QVAR) 80 MCG/ACT inhaler, Inhale 1 puff 2 (Two) Times a Day., Disp: 1 inhaler, Rfl: 11     The following portions of the patient's history were reviewed and updated as appropriate: allergies, current medications, past family history, past medical history, past social history, past surgical history and problem  list.    Review of Systems   Constitutional: Positive for activity change. Negative for chills, fatigue and fever.   HENT: Negative for congestion, ear pain, postnasal drip, rhinorrhea, sinus pressure, sneezing and sore throat.    Eyes: Negative for pain, discharge, redness and itching.   Respiratory: Positive for cough and shortness of breath. Negative for chest tightness and wheezing.    Cardiovascular: Negative for chest pain.   Gastrointestinal: Negative for abdominal pain, diarrhea, nausea and vomiting.   Musculoskeletal: Negative for arthralgias and myalgias.   Skin: Negative for rash.   Neurological: Negative for headaches.   Hematological: Negative for adenopathy.       Objective:    /60   Pulse 88   Temp 98.1 °F (36.7 °C) (Temporal)   Resp 16   Wt 56.2 kg (124 lb)   SpO2 94%   BMI 20.01 kg/m²     Physical Exam   Constitutional: She is oriented to person, place, and time. She appears well-developed and well-nourished. She is active and cooperative.  Non-toxic appearance. She does not have a sickly appearance. She appears ill. No distress.   HENT:   Head: Normocephalic and atraumatic.   Right Ear: Tympanic membrane, external ear and ear canal normal.   Left Ear: Tympanic membrane, external ear and ear canal normal.   Nose: Nose normal.   Mouth/Throat: Uvula is midline, oropharynx is clear and moist and mucous membranes are normal. No oral lesions.   Eyes: Conjunctivae and lids are normal.   Neck: Normal range of motion. Neck supple.   Cardiovascular: Normal rate and regular rhythm.   No murmur heard.  Pulmonary/Chest: Breath sounds normal. Accessory muscle usage present.   Lymphadenopathy:     She has no cervical adenopathy.   Neurological: She is alert and oriented to person, place, and time.   Skin: Skin is warm, dry and intact. No rash noted.   Psychiatric: She has a normal mood and affect.   Nursing note and vitals reviewed.      Assessment/Plan:    Jud was seen today for  follow-up.    Diagnoses and all orders for this visit:    Moderate persistent asthma with acute exacerbation  -     beclomethasone (QVAR) 80 MCG/ACT inhaler; Inhale 1 puff 2 (Two) Times a Day.    Complete doxycycline and prednisone and use Nebulizer and albuterol as needed. Continue Singulair. Maintain pulmonology follow on 9/20/2019.     Return in about 1 month (around 10/6/2019), or if symptoms worsen or fail to improve.

## 2019-09-11 ENCOUNTER — OFFICE VISIT (OUTPATIENT)
Dept: INTERNAL MEDICINE | Facility: CLINIC | Age: 59
End: 2019-09-11

## 2019-09-11 VITALS
TEMPERATURE: 97.4 F | BODY MASS INDEX: 20.48 KG/M2 | RESPIRATION RATE: 16 BRPM | SYSTOLIC BLOOD PRESSURE: 108 MMHG | WEIGHT: 127.4 LBS | DIASTOLIC BLOOD PRESSURE: 62 MMHG | HEART RATE: 68 BPM | HEIGHT: 66 IN | OXYGEN SATURATION: 99 %

## 2019-09-11 DIAGNOSIS — J45.41 MODERATE PERSISTENT ASTHMA WITH ACUTE EXACERBATION: Primary | ICD-10-CM

## 2019-09-11 PROCEDURE — 99213 OFFICE O/P EST LOW 20 MIN: CPT | Performed by: PHYSICIAN ASSISTANT

## 2019-09-11 RX ORDER — BECLOMETHASONE DIPROPIONATE HFA 80 UG/1
1 AEROSOL, METERED RESPIRATORY (INHALATION) 2 TIMES DAILY
Refills: 11 | COMMUNITY
Start: 2019-09-06 | End: 2019-09-11

## 2019-09-11 NOTE — PROGRESS NOTES
Chief Complaint   Patient presents with   • Bronchitis     x1 week F/U, chest pressure, pt works around ink thinners,       Subjective       History of Present Illness     Jud Garnica is a 58 y.o. female. She presents for follow up of bronchitis. Pt was seen at [facility] ED on 9/4/2019 and dx with bronchitis, acute exacerbation of asthma. Pt has been taking doxycycline BID as directed, prednisone, and using DuoNeb as directed. She continues with her QVAR and rescue inhaler. She reports that she is feeling better with less chest pressure and congestion and mucous have resolved, but continues to have cough and mild chest tightness/ pressure consistent with asthma symptoms. She denies fever, chills, sore throat, SOA, abdominal pain, N/V/D. Mild wheezing on occasion but also improved. She does see pulmonary medicine with next f/u scheduled 9/20/2019.     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 Since that time, she has had more acute flares in her asthma and she has missed several days of work. Pt would like to return to an area of the building where she has minimal exposure to chemicals where she was previously working, but needs a note from PCP for medical necessity. She feels that she could work more effectively with less missed days and less asthma symptoms if she were to return to her previous work space.       The following portions of the patient's history were reviewed and updated as appropriate: allergies, current medications, past medical history, past social history and problem list.    Allergies   Allergen Reactions   • Codeine Irritability   • Nyquil Multi-Symptom [Pseudoeph-Doxylamine-Dm-Apap] Irritability     Social History     Tobacco Use   • Smoking status: Never Smoker   • Smokeless tobacco: Never Used   Substance Use Topics   • Alcohol use: No         Current Outpatient  Medications:   •  albuterol sulfate  (90 Base) MCG/ACT inhaler, Inhale 2 puffs Every 4 (Four) Hours As Needed for Wheezing., Disp: 1 inhaler, Rfl: 0  •  beclomethasone (QVAR) 80 MCG/ACT inhaler, Inhale 1 puff 2 (Two) Times a Day., Disp: 1 inhaler, Rfl: 11  •  ipratropium-albuterol (DUO-NEB) 0.5-2.5 mg/3 ml nebulizer, Take 3 mL by nebulization 4 (Four) Times a Day., Disp: 360 mL, Rfl: 0  •  Multiple Vitamins-Minerals (MULTIVITAMINS/MINERALS ADULT PO), Take  by mouth., Disp: , Rfl:     Review of Systems   Constitutional: Negative for chills, fatigue, fever and unexpected weight loss.   HENT: Negative for congestion, ear pain and sore throat.    Eyes: Negative for pain.   Respiratory: Positive for cough, chest tightness and wheezing. Negative for shortness of breath.    Cardiovascular: Negative for chest pain and palpitations.   Gastrointestinal: Negative for abdominal pain, nausea and vomiting.   Genitourinary: Negative for dysuria.   Neurological: Negative for dizziness and headache.       Objective   Vitals:    09/11/19 1126   BP: 108/62   Pulse: 68   Resp: 16   Temp: 97.4 °F (36.3 °C)   SpO2: 99%     Physical Exam   Constitutional: She appears well-developed and well-nourished.   HENT:   Head: Normocephalic and atraumatic.   Right Ear: Tympanic membrane, external ear and ear canal normal.   Left Ear: Tympanic membrane, external ear and ear canal normal.   Mouth/Throat: Oropharynx is clear and moist and mucous membranes are normal.   Eyes: Conjunctivae are normal.   Cardiovascular: Normal rate and regular rhythm.   No murmur heard.  Pulmonary/Chest: Effort normal and breath sounds normal. She has no wheezes. She has no rhonchi. She has no rales.   Lymphadenopathy:     She has no cervical adenopathy.   Psychiatric: She has a normal mood and affect. Her behavior is normal.         Assessment/Plan   Jud was seen today for bronchitis.    Diagnoses and all orders for this visit:    Moderate persistent asthma  with acute exacerbation      Continue Advair, DuoNeb as directed.   Will call pt when letter is ready for pickup for her workplace.   Continue follow up with pulmonary medicine as scheduled.              Return for Next scheduled follow up.

## 2019-09-16 ENCOUNTER — TELEPHONE (OUTPATIENT)
Dept: INTERNAL MEDICINE | Facility: CLINIC | Age: 59
End: 2019-09-16

## 2019-09-16 NOTE — TELEPHONE ENCOUNTER
Patient stopped by to check on her letter for work. Patient stated she was off work today and is returning tomorrow. Patient can be reached at [phone]

## 2019-09-19 NOTE — TELEPHONE ENCOUNTER
Eddi Garnica [phone]  [facility] advising of clinical message. Office closes today at 4:30. Open tomorrow @ 7:45am. Office number given for any questions or concerns.

## 2019-09-20 ENCOUNTER — OFFICE VISIT (OUTPATIENT)
Dept: PULMONOLOGY | Facility: CLINIC | Age: 59
End: 2019-09-20

## 2019-09-20 VITALS
HEART RATE: 80 BPM | SYSTOLIC BLOOD PRESSURE: 120 MMHG | OXYGEN SATURATION: 96 % | DIASTOLIC BLOOD PRESSURE: 76 MMHG | HEIGHT: 66 IN | BODY MASS INDEX: 19.93 KG/M2 | TEMPERATURE: 98 F | WEIGHT: 124 LBS

## 2019-09-20 DIAGNOSIS — Z91.09 MULTIPLE ENVIRONMENTAL ALLERGIES: ICD-10-CM

## 2019-09-20 DIAGNOSIS — J45.909 MILD ASTHMA, UNSPECIFIED WHETHER COMPLICATED, UNSPECIFIED WHETHER PERSISTENT: Primary | ICD-10-CM

## 2019-09-20 PROCEDURE — 94729 DIFFUSING CAPACITY: CPT | Performed by: NURSE PRACTITIONER

## 2019-09-20 PROCEDURE — 94375 RESPIRATORY FLOW VOLUME LOOP: CPT | Performed by: NURSE PRACTITIONER

## 2019-09-20 PROCEDURE — 94726 PLETHYSMOGRAPHY LUNG VOLUMES: CPT | Performed by: NURSE PRACTITIONER

## 2019-09-20 PROCEDURE — 99214 OFFICE O/P EST MOD 30 MIN: CPT | Performed by: NURSE PRACTITIONER

## 2019-09-20 NOTE — TELEPHONE ENCOUNTER
Jud Garnica [phone]  Spoke to pt, confirmed she received the voicemail yesterday, pt confirmed she will be in the office today to  the letter. Good verbal understanding.

## 2019-09-20 NOTE — PROGRESS NOTES
[facility]    CHIEF COMPLAINT    Asthma, environmental allergies    Subjective   HISTORY OF PRESENT ILLNESS    Jud Garnica is a 58 y.o.female here today for anuual follow up on her asthma.  She has had a couple exacerbations over the last year.  Recently earlier this month.  She has been on a course of steroids.  She is currently using Qvar for her chronic maintenance therapy.      Her asthma is aggravated by seasonal allergies as well as chronic exposures at work.    Today she is feeling back to normal.  She has some minor shortness of breath as well as a continued productive cough.  She states the cough is rather thick in nature and occasionally creamy colored.  She denies any wheezing or chest tightness.  She denies any fevers or chills.        Patient Active Problem List   Diagnosis   • Cough   • Asthma   • Multiple environmental allergies   • Occupational exposure to chemicals       Allergies   Allergen Reactions   • Codeine Irritability   • Nyquil Multi-Symptom [Pseudoeph-Doxylamine-Dm-Apap] Irritability       Current Outpatient Medications:   •  albuterol sulfate  (90 Base) MCG/ACT inhaler, Inhale 2 puffs Every 4 (Four) Hours As Needed for Wheezing., Disp: 1 inhaler, Rfl: 0  •  beclomethasone (QVAR) 80 MCG/ACT inhaler, Inhale 1 puff 2 (Two) Times a Day., Disp: 1 inhaler, Rfl: 11  •  ipratropium-albuterol (DUO-NEB) 0.5-2.5 mg/3 ml nebulizer, Take 3 mL by nebulization 4 (Four) Times a Day., Disp: 360 mL, Rfl: 0  •  Multiple Vitamins-Minerals (MULTIVITAMINS/MINERALS ADULT PO), Take  by mouth., Disp: , Rfl:   MEDICATION LIST AND ALLERGIES REVIEWED.    Social History     Tobacco Use   • Smoking status: Never Smoker   • Smokeless tobacco: Never Used   Substance Use Topics   • Alcohol use: No   • Drug use: No       FAMILY AND SOCIAL HISTORY REVIEWED.    Review of Systems   Constitutional: Positive for activity change. Negative for chills, fatigue, fever and unexpected weight change.  "  HENT: Positive for rhinorrhea. Negative for congestion, nosebleeds, postnasal drip, sinus pressure and trouble swallowing.    Respiratory: Positive for cough and shortness of breath. Negative for chest tightness and wheezing.    Cardiovascular: Negative for chest pain and leg swelling.   Gastrointestinal: Negative for abdominal pain, constipation, diarrhea, nausea and vomiting.   Genitourinary: Negative for dysuria, frequency, hematuria and urgency.   Musculoskeletal: Negative for myalgias.   Allergic/Immunologic: Positive for environmental allergies.   Neurological: Negative for dizziness, weakness, numbness and headaches.   All other systems reviewed and are negative.  .  Objective   /76   Pulse 80   Temp 98 °F (36.7 °C)   Ht 167.6 cm (66\")   Wt 56.2 kg (124 lb)   LMP  (LMP Unknown)   SpO2 96% Comment: resting, room air  Breastfeeding? No   BMI 20.01 kg/m²     Immunization History   Administered Date(s) Administered   • Tdap 04/23/2018       Physical Exam   Constitutional: She is oriented to person, place, and time. She appears well-developed and well-nourished.   HENT:   Head: Normocephalic and atraumatic.   Eyes: EOM are normal. Pupils are equal, round, and reactive to light.   Neck: Normal range of motion. Neck supple.   Cardiovascular: Normal rate and regular rhythm.   No murmur heard.  Pulmonary/Chest: Effort normal and breath sounds normal. No respiratory distress. She has no wheezes. She has no rales.   Abdominal: Soft. Bowel sounds are normal. She exhibits no distension.   Musculoskeletal: Normal range of motion. She exhibits no edema.   Neurological: She is alert and oriented to person, place, and time.   Skin: Skin is warm and dry. No erythema.   Psychiatric: She has a normal mood and affect. Her behavior is normal.   Vitals reviewed.        RESULTS    PFTS in the office today, read by me:  No obstruction but a decreased FVC and normal total lung capacity.  FVC is 1.79, 48% predicted.  No " significant changes from last year.      EXAMINATION: XR CHEST 1 VW- 09/04/2019     INDICATION: Chest Pain triage protocol      COMPARISON: 12/21/2018     FINDINGS: Cardiac silhouette is normal. There are advanced chronic  pulmonary changes. There is no consolidation, mass or effusion.         IMPRESSION:  Chronic change; no acute findings.     D:  09/04/2019  E:  09/04/2019         Lab Results   Component Value Date    GLUCOSE 94 09/04/2019    BUN 11 09/04/2019    CREATININE 0.80 09/04/2019    EGFRIFNONA 73 09/04/2019    BCR 13.8 09/04/2019    K 4.1 09/04/2019    CO2 24.0 09/04/2019    CALCIUM 9.6 09/04/2019    ALBUMIN 4.20 09/04/2019    AST 23 09/04/2019    ALT 14 09/04/2019     Lab Results   Component Value Date    WBC 9.79 09/04/2019    HGB 12.9 09/04/2019    HCT 38.8 09/04/2019    MCV 87.6 09/04/2019     09/04/2019         Assessment/Plan     PROBLEM LIST    Problem List Items Addressed This Visit        Respiratory    Asthma - Primary    Relevant Orders    Pulmonary Function Test (Completed)    XR Chest PA & Lateral       Other    Multiple environmental allergies            DISCUSSION    We went over her testing today in the office.  Overall her PFTs and chest x-ray have not changed.  Continue on her Qvar.  She is intolerant to montelukast secondary to tremors and anxiety.    Continue to use her nebulizers as needed especially with episodes of bronchitis.  She can also use the Mucinex as needed to help with secretion clearance.    Follow-up annually or sooner if needed.    I spent 25 minutes with the patient. I spent > 50% percent of this time counseling and discussing diagnostic testing, evaluation, current status and management.    Dary Watson, APRN  09/20/20198:13 AM  Electronically signed     Please note that portions of this note were completed with a voice recognition program. Efforts were made to edit the dictations, but occasionally words are mistranscribed.      CC: Arpita Batres,  TROY

## 2019-09-27 ENCOUNTER — OFFICE VISIT (OUTPATIENT)
Dept: INTERNAL MEDICINE | Facility: CLINIC | Age: 59
End: 2019-09-27

## 2019-09-27 VITALS
SYSTOLIC BLOOD PRESSURE: 102 MMHG | TEMPERATURE: 98.2 F | RESPIRATION RATE: 21 BRPM | DIASTOLIC BLOOD PRESSURE: 60 MMHG | BODY MASS INDEX: 20.18 KG/M2 | WEIGHT: 125 LBS

## 2019-09-27 DIAGNOSIS — J45.40 MODERATE PERSISTENT ASTHMA WITHOUT COMPLICATION: Primary | ICD-10-CM

## 2019-09-27 PROCEDURE — 99213 OFFICE O/P EST LOW 20 MIN: CPT | Performed by: PHYSICIAN ASSISTANT

## 2019-09-27 NOTE — PROGRESS NOTES
Chief Complaint   Patient presents with   • Asthma       Subjective       History of Present Illness     Jud Garnica is a 58 y.o. female. She presents for follow up of asthma. Pt's recent acute flare has resolved. She states she still has a very mild cough with scant yellow mucous production in AM along with PND and runny nose, but believes this is related to allergies and weather change more than asthma. She denies SOA, wheezing, chest tightness. Her severe coughing has resolved. She continues to use QVAR daily and rescue inhaler PRN, and this controls her symptoms well. She has not needed nebs in over a week. 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     The following portions of the patient's history were reviewed and updated as appropriate: allergies, current medications, past medical history, past social history and problem list.    Allergies   Allergen Reactions   • Codeine Irritability   • Nyquil Multi-Symptom [Pseudoeph-Doxylamine-Dm-Apap] Irritability     Social History     Tobacco Use   • Smoking status: Never Smoker   • Smokeless tobacco: Never Used   Substance Use Topics   • Alcohol use: No         Current Outpatient Medications:   •  albuterol sulfate  (90 Base) MCG/ACT inhaler, Inhale 2 puffs Every 4 (Four) Hours As Needed for Wheezing., Disp: 1 inhaler, Rfl: 0  •  beclomethasone (QVAR) 80 MCG/ACT inhaler, Inhale 1 puff 2 (Two) Times a Day., Disp: 1 inhaler, Rfl: 11  •  ipratropium-albuterol (DUO-NEB) 0.5-2.5 mg/3 ml nebulizer, Take 3 mL by nebulization 4 (Four) Times a Day., Disp: 360 mL, Rfl: 0  •  Multiple Vitamins-Minerals (MULTIVITAMINS/MINERALS ADULT PO), Take  by mouth., Disp: , Rfl:     Review of Systems   Constitutional: Negative for chills, fatigue and fever.   HENT: Negative for congestion, ear pain,  sinus pressure, sneezing and sore throat.    Eyes: Negative for pain.   Respiratory: Positive for cough. Negative for chest tightness, shortness of breath and wheezing.    Cardiovascular: Negative for chest pain.   Gastrointestinal: Negative for abdominal pain, nausea and vomiting.   Genitourinary: Negative for dysuria.   Neurological: Negative for dizziness and headache.       Objective   Vitals:    09/27/19 0853   BP: 102/60   Resp: 21   Temp: 98.2 °F (36.8 °C)     Physical Exam   Constitutional: She appears well-developed and well-nourished.   HENT:   Head: Normocephalic and atraumatic.   Right Ear: Tympanic membrane, external ear and ear canal normal.   Left Ear: Tympanic membrane, external ear and ear canal normal.   Mouth/Throat: Oropharynx is clear and moist and mucous membranes are normal.   Eyes: Conjunctivae are normal.   Cardiovascular: Normal rate and regular rhythm.   No murmur heard.  Pulmonary/Chest: Effort normal and breath sounds normal. She has no decreased breath sounds. She has no wheezes. She has no rales.   Lymphadenopathy:     She has no cervical adenopathy.   Psychiatric: She has a normal mood and affect. Her behavior is normal.             Assessment/Plan   Jud was seen today for asthma.    Diagnoses and all orders for this visit:    Moderate persistent asthma without complication  - Continue QVAR daily as directed.   - Continue albuterol inhaler PRN.  - Advised Xyzal 5mg nightly for allergies.          Return in about 6 months (around 3/27/2020) for Annual physical- fasting labs .

## 2020-01-01 ENCOUNTER — APPOINTMENT (OUTPATIENT)
Dept: GENERAL RADIOLOGY | Facility: HOSPITAL | Age: 60
End: 2020-01-01

## 2020-01-01 ENCOUNTER — HOSPITAL ENCOUNTER (EMERGENCY)
Facility: HOSPITAL | Age: 60
Discharge: HOME | End: 2020-01-01
Attending: EMERGENCY MEDICINE | Admitting: EMERGENCY MEDICINE

## 2020-01-01 VITALS
HEART RATE: 87 BPM | WEIGHT: 120 LBS | BODY MASS INDEX: 18.83 KG/M2 | RESPIRATION RATE: 16 BRPM | DIASTOLIC BLOOD PRESSURE: 62 MMHG | TEMPERATURE: 98.6 F | HEIGHT: 67 IN | SYSTOLIC BLOOD PRESSURE: 110 MMHG | OXYGEN SATURATION: 96 %

## 2020-01-01 DIAGNOSIS — J18.9 COMMUNITY ACQUIRED PNEUMONIA OF LEFT LUNG, UNSPECIFIED PART OF LUNG: Primary | ICD-10-CM

## 2020-01-01 LAB
ALBUMIN SERPL-MCNC: 3.9 G/DL (ref 3.5–5.2)
ALBUMIN/GLOB SERPL: 1.1 G/DL
ALP SERPL-CCNC: 69 U/L (ref 39–117)
ALT SERPL W P-5'-P-CCNC: 24 U/L (ref 1–33)
ANION GAP SERPL CALCULATED.3IONS-SCNC: 12 MMOL/L (ref 5–15)
AST SERPL-CCNC: 35 U/L (ref 1–32)
BASOPHILS # BLD AUTO: 0.02 10*3/MM3 (ref 0–0.2)
BASOPHILS NFR BLD AUTO: 0.3 % (ref 0–1.5)
BILIRUB SERPL-MCNC: 0.9 MG/DL (ref 0.2–1.2)
BUN BLD-MCNC: 14 MG/DL (ref 8–23)
BUN/CREAT SERPL: 18.4 (ref 7–25)
CALCIUM SPEC-SCNC: 9.4 MG/DL (ref 8.6–10.5)
CHLORIDE SERPL-SCNC: 99 MMOL/L (ref 98–107)
CO2 SERPL-SCNC: 25 MMOL/L (ref 22–29)
CREAT BLD-MCNC: 0.76 MG/DL (ref 0.57–1)
DEPRECATED RDW RBC AUTO: 44.1 FL (ref 37–54)
EOSINOPHIL # BLD AUTO: 0.02 10*3/MM3 (ref 0–0.4)
EOSINOPHIL NFR BLD AUTO: 0.3 % (ref 0.3–6.2)
ERYTHROCYTE [DISTWIDTH] IN BLOOD BY AUTOMATED COUNT: 14.3 % (ref 12.3–15.4)
FLUAV AG NPH QL: NEGATIVE
FLUBV AG NPH QL IA: NEGATIVE
GFR SERPL CREATININE-BSD FRML MDRD: 78 ML/MIN/1.73
GLOBULIN UR ELPH-MCNC: 3.7 GM/DL
GLUCOSE BLD-MCNC: 87 MG/DL (ref 65–99)
HCT VFR BLD AUTO: 37.4 % (ref 34–46.6)
HGB BLD-MCNC: 12.1 G/DL (ref 12–15.9)
HOLD SPECIMEN: NORMAL
HOLD SPECIMEN: NORMAL
IMM GRANULOCYTES # BLD AUTO: 0.02 10*3/MM3 (ref 0–0.05)
IMM GRANULOCYTES NFR BLD AUTO: 0.3 % (ref 0–0.5)
LIPASE SERPL-CCNC: 34 U/L (ref 13–60)
LYMPHOCYTES # BLD AUTO: 1.5 10*3/MM3 (ref 0.7–3.1)
LYMPHOCYTES NFR BLD AUTO: 19.4 % (ref 19.6–45.3)
MCH RBC QN AUTO: 27.6 PG (ref 26.6–33)
MCHC RBC AUTO-ENTMCNC: 32.4 G/DL (ref 31.5–35.7)
MCV RBC AUTO: 85.4 FL (ref 79–97)
MONOCYTES # BLD AUTO: 0.54 10*3/MM3 (ref 0.1–0.9)
MONOCYTES NFR BLD AUTO: 7 % (ref 5–12)
NEUTROPHILS # BLD AUTO: 5.64 10*3/MM3 (ref 1.7–7)
NEUTROPHILS NFR BLD AUTO: 72.7 % (ref 42.7–76)
NRBC BLD AUTO-RTO: 0 /100 WBC (ref 0–0.2)
NT-PROBNP SERPL-MCNC: 241.1 PG/ML (ref 5–900)
PLATELET # BLD AUTO: 161 10*3/MM3 (ref 140–450)
PMV BLD AUTO: 9.2 FL (ref 6–12)
POTASSIUM BLD-SCNC: 3.7 MMOL/L (ref 3.5–5.2)
PROT SERPL-MCNC: 7.6 G/DL (ref 6–8.5)
RBC # BLD AUTO: 4.38 10*6/MM3 (ref 3.77–5.28)
SODIUM BLD-SCNC: 136 MMOL/L (ref 136–145)
TROPONIN T SERPL-MCNC: <0.01 NG/ML (ref 0–0.03)
TROPONIN T SERPL-MCNC: <0.01 NG/ML (ref 0–0.03)
WBC NRBC COR # BLD: 7.74 10*3/MM3 (ref 3.4–10.8)
WHOLE BLOOD HOLD SPECIMEN: NORMAL
WHOLE BLOOD HOLD SPECIMEN: NORMAL

## 2020-01-01 PROCEDURE — 83880 ASSAY OF NATRIURETIC PEPTIDE: CPT | Performed by: EMERGENCY MEDICINE

## 2020-01-01 PROCEDURE — 87804 INFLUENZA ASSAY W/OPTIC: CPT | Performed by: EMERGENCY MEDICINE

## 2020-01-01 PROCEDURE — 84484 ASSAY OF TROPONIN QUANT: CPT | Performed by: EMERGENCY MEDICINE

## 2020-01-01 PROCEDURE — 85025 COMPLETE CBC W/AUTO DIFF WBC: CPT | Performed by: EMERGENCY MEDICINE

## 2020-01-01 PROCEDURE — 94640 AIRWAY INHALATION TREATMENT: CPT

## 2020-01-01 PROCEDURE — 93005 ELECTROCARDIOGRAM TRACING: CPT | Performed by: EMERGENCY MEDICINE

## 2020-01-01 PROCEDURE — 25010000002 METHYLPREDNISOLONE PER 125 MG: Performed by: EMERGENCY MEDICINE

## 2020-01-01 PROCEDURE — 80053 COMPREHEN METABOLIC PANEL: CPT | Performed by: EMERGENCY MEDICINE

## 2020-01-01 PROCEDURE — 96374 THER/PROPH/DIAG INJ IV PUSH: CPT

## 2020-01-01 PROCEDURE — 71045 X-RAY EXAM CHEST 1 VIEW: CPT

## 2020-01-01 PROCEDURE — 83690 ASSAY OF LIPASE: CPT | Performed by: EMERGENCY MEDICINE

## 2020-01-01 PROCEDURE — 99284 EMERGENCY DEPT VISIT MOD MDM: CPT

## 2020-01-01 RX ORDER — PREDNISONE 20 MG/1
60 TABLET ORAL DAILY
Qty: 15 TABLET | Refills: 0 | Status: SHIPPED | OUTPATIENT
Start: 2020-01-01 | End: 2020-01-30

## 2020-01-01 RX ORDER — IPRATROPIUM BROMIDE AND ALBUTEROL SULFATE 2.5; .5 MG/3ML; MG/3ML
3 SOLUTION RESPIRATORY (INHALATION) ONCE
Status: COMPLETED | OUTPATIENT
Start: 2020-01-01 | End: 2020-01-01

## 2020-01-01 RX ORDER — ASPIRIN 81 MG/1
324 TABLET, CHEWABLE ORAL ONCE
Status: COMPLETED | OUTPATIENT
Start: 2020-01-01 | End: 2020-01-01

## 2020-01-01 RX ORDER — DOXYCYCLINE 100 MG/1
100 CAPSULE ORAL 2 TIMES DAILY
Qty: 14 CAPSULE | Refills: 0 | Status: SHIPPED | OUTPATIENT
Start: 2020-01-01 | End: 2020-01-30

## 2020-01-01 RX ORDER — METHYLPREDNISOLONE SODIUM SUCCINATE 125 MG/2ML
125 INJECTION, POWDER, LYOPHILIZED, FOR SOLUTION INTRAMUSCULAR; INTRAVENOUS ONCE
Status: COMPLETED | OUTPATIENT
Start: 2020-01-01 | End: 2020-01-01

## 2020-01-01 RX ORDER — SODIUM CHLORIDE 0.9 % (FLUSH) 0.9 %
10 SYRINGE (ML) INJECTION AS NEEDED
Status: DISCONTINUED | OUTPATIENT
Start: 2020-01-01 | End: 2020-01-01 | Stop reason: HOSPADM

## 2020-01-01 RX ADMIN — ASPIRIN 81 MG 324 MG: 81 TABLET ORAL at 14:45

## 2020-01-01 RX ADMIN — METHYLPREDNISOLONE SODIUM SUCCINATE 125 MG: 125 INJECTION, POWDER, FOR SOLUTION INTRAMUSCULAR; INTRAVENOUS at 15:16

## 2020-01-01 RX ADMIN — IPRATROPIUM BROMIDE AND ALBUTEROL SULFATE 3 ML: 2.5; .5 SOLUTION RESPIRATORY (INHALATION) at 15:28

## 2020-01-01 NOTE — ED PROVIDER NOTES
Subjective   Jud Garnica is a 59 y.o.female who presents to the ED with complaints of bilateral chest pain. The patient reports her pain has been ongoing for the past four days. She describes her pain as a sharp, stabbing sensation that worsens when she breathes deeply or coughs. She has not taken any medication for her pain. She also complains of a productive cough. She denies any fever. Additionally, she has a history of asthma. She did not receive a flu shot this year. She also has a history of pleuesy, but she states her current pain is not similar to her previous sensations. There are no other complaints at this time.       History provided by:  Patient  Chest Pain   Pain location:  L chest and R chest  Pain quality: sharp and stabbing    Pain radiates to:  Does not radiate  Pain severity:  Moderate  Onset quality:  Sudden  Duration:  4 days  Timing:  Constant  Progression:  Unchanged  Chronicity:  New  Relieved by:  None tried  Worsened by:  Coughing and deep breathing  Ineffective treatments:  None tried  Associated symptoms: cough    Associated symptoms: no fever    Cough:     Cough characteristics:  Productive      Review of Systems   Constitutional: Negative for fever.   Respiratory: Positive for cough.    Cardiovascular: Positive for chest pain.   All other systems reviewed and are negative.      Past Medical History:   Diagnosis Date   • Asthma    • Pneumonia 2018   • Uterine cancer (CMS/HCC)        Allergies   Allergen Reactions   • Codeine Irritability   • Nyquil Multi-Symptom [Pseudoeph-Doxylamine-Dm-Apap] Irritability       Past Surgical History:   Procedure Laterality Date   • BREAST BIOPSY Right 06/08/2018     bx   • HYSTERECTOMY      age 29       Family History   Problem Relation Age of Onset   • Dementia Mother    • Cancer Father    • Heart attack Maternal Grandfather    • Anuerysm Maternal Grandfather    • Heart attack Paternal Grandmother    • Cancer Paternal Grandmother    • Dementia  Paternal Grandfather    • Breast cancer Neg Hx    • Ovarian cancer Neg Hx        Social History     Socioeconomic History   • Marital status:      Spouse name: Not on file   • Number of children: Not on file   • Years of education: Not on file   • Highest education level: Not on file   Tobacco Use   • Smoking status: Never Smoker   • Smokeless tobacco: Never Used   Substance and Sexual Activity   • Alcohol use: No   • Drug use: No   • Sexual activity: Defer         Objective   Physical Exam   Constitutional: She is oriented to person, place, and time. She appears well-developed and well-nourished.   HENT:   Head: Normocephalic and atraumatic.   Eyes: Conjunctivae are normal. No scleral icterus.   Neck: Normal range of motion. Neck supple.   Cardiovascular: Normal rate, regular rhythm, normal heart sounds and intact distal pulses.   No murmur heard.  Pulmonary/Chest: Effort normal and breath sounds normal. No respiratory distress.   Abdominal: Soft. Bowel sounds are normal. There is no tenderness.   Musculoskeletal: Normal range of motion. She exhibits no edema.   Neurological: She is alert and oriented to person, place, and time.   Skin: Skin is warm and dry.   Psychiatric: She has a normal mood and affect. Her behavior is normal.   Nursing note and vitals reviewed.      Procedures         ED Course     CXR with some slight patchy opacities questionable for early pneumonia.  EKG NSR.  Labs benign.  Flu negative.  Pt feels much better with treatment.  Patient stable on serial rechecks.  Discussed findings, concerns, plan of care, expected course, reasons to return and followup.  Provided the opportunity to ask questions.                  MDM  Number of Diagnoses or Management Options  Community acquired pneumonia of left lung, unspecified part of lung:      Amount and/or Complexity of Data Reviewed  Clinical lab tests: reviewed and ordered  Tests in the radiology section of CPT®: reviewed and  ordered  Independent visualization of images, tracings, or specimens: yes        Final diagnoses:   Community acquired pneumonia of left lung, unspecified part of lung       Documentation assistance provided by jocelyn Yeung.  Information recorded by the federicoibbetty was done at my direction and has been verified and validated by me.     Cole Yeung  01/01/20 1517       Cole Yeung  01/01/20 1811       Huber Eduardo MD  01/01/20 1936

## 2020-01-30 ENCOUNTER — OFFICE VISIT (OUTPATIENT)
Dept: INTERNAL MEDICINE | Facility: CLINIC | Age: 60
End: 2020-01-30

## 2020-01-30 VITALS
BODY MASS INDEX: 20.56 KG/M2 | DIASTOLIC BLOOD PRESSURE: 80 MMHG | RESPIRATION RATE: 16 BRPM | WEIGHT: 131 LBS | OXYGEN SATURATION: 96 % | SYSTOLIC BLOOD PRESSURE: 110 MMHG | HEART RATE: 84 BPM | HEIGHT: 67 IN | TEMPERATURE: 97.5 F

## 2020-01-30 DIAGNOSIS — Z91.09 MULTIPLE ENVIRONMENTAL ALLERGIES: ICD-10-CM

## 2020-01-30 DIAGNOSIS — J45.40 MODERATE PERSISTENT ASTHMA WITHOUT COMPLICATION: Primary | ICD-10-CM

## 2020-01-30 PROCEDURE — 99213 OFFICE O/P EST LOW 20 MIN: CPT | Performed by: PHYSICIAN ASSISTANT

## 2020-06-18 ENCOUNTER — OFFICE VISIT (OUTPATIENT)
Dept: INTERNAL MEDICINE | Facility: CLINIC | Age: 60
End: 2020-06-18

## 2020-06-18 VITALS
OXYGEN SATURATION: 99 % | SYSTOLIC BLOOD PRESSURE: 110 MMHG | DIASTOLIC BLOOD PRESSURE: 70 MMHG | TEMPERATURE: 98 F | RESPIRATION RATE: 16 BRPM | BODY MASS INDEX: 20.59 KG/M2 | WEIGHT: 131.2 LBS | HEART RATE: 76 BPM | HEIGHT: 67 IN

## 2020-06-18 DIAGNOSIS — Z00.00 ENCOUNTER FOR HEALTH MAINTENANCE EXAMINATION: Primary | ICD-10-CM

## 2020-06-18 DIAGNOSIS — J45.40 MODERATE PERSISTENT ASTHMA WITHOUT COMPLICATION: ICD-10-CM

## 2020-06-18 DIAGNOSIS — R00.2 HEART PALPITATIONS: ICD-10-CM

## 2020-06-18 DIAGNOSIS — Z13.220 SCREENING FOR LIPID DISORDERS: ICD-10-CM

## 2020-06-18 PROBLEM — R05.9 COUGH: Status: RESOLVED | Noted: 2018-03-17 | Resolved: 2020-06-18

## 2020-06-18 LAB
ALBUMIN SERPL-MCNC: 4.5 G/DL (ref 3.5–5.2)
ALBUMIN/GLOB SERPL: 1.6 G/DL
ALP SERPL-CCNC: 69 U/L (ref 39–117)
ALT SERPL W P-5'-P-CCNC: 16 U/L (ref 1–33)
ANION GAP SERPL CALCULATED.3IONS-SCNC: 9.8 MMOL/L (ref 5–15)
AST SERPL-CCNC: 25 U/L (ref 1–32)
BASOPHILS # BLD AUTO: 0.02 10*3/MM3 (ref 0–0.2)
BASOPHILS NFR BLD AUTO: 0.4 % (ref 0–1.5)
BILIRUB BLD-MCNC: NEGATIVE MG/DL
BILIRUB SERPL-MCNC: 1.6 MG/DL (ref 0.2–1.2)
BUN BLD-MCNC: 17 MG/DL (ref 8–23)
BUN/CREAT SERPL: 20.5 (ref 7–25)
CALCIUM SPEC-SCNC: 9.7 MG/DL (ref 8.6–10.5)
CHLORIDE SERPL-SCNC: 105 MMOL/L (ref 98–107)
CHOLEST SERPL-MCNC: 150 MG/DL (ref 0–200)
CLARITY, POC: CLEAR
CO2 SERPL-SCNC: 25.2 MMOL/L (ref 22–29)
COLOR UR: YELLOW
CREAT BLD-MCNC: 0.83 MG/DL (ref 0.57–1)
DEPRECATED RDW RBC AUTO: 42.1 FL (ref 37–54)
EOSINOPHIL # BLD AUTO: 0.09 10*3/MM3 (ref 0–0.4)
EOSINOPHIL NFR BLD AUTO: 1.9 % (ref 0.3–6.2)
ERYTHROCYTE [DISTWIDTH] IN BLOOD BY AUTOMATED COUNT: 13.5 % (ref 12.3–15.4)
EXPIRATION DATE: ABNORMAL
GFR SERPL CREATININE-BSD FRML MDRD: 70 ML/MIN/1.73
GLOBULIN UR ELPH-MCNC: 2.8 GM/DL
GLUCOSE BLD-MCNC: 82 MG/DL (ref 65–99)
GLUCOSE UR STRIP-MCNC: NEGATIVE MG/DL
HCT VFR BLD AUTO: 40.3 % (ref 34–46.6)
HDLC SERPL-MCNC: 90 MG/DL (ref 40–60)
HGB BLD-MCNC: 13.1 G/DL (ref 12–15.9)
IMM GRANULOCYTES # BLD AUTO: 0.01 10*3/MM3 (ref 0–0.05)
IMM GRANULOCYTES NFR BLD AUTO: 0.2 % (ref 0–0.5)
KETONES UR QL: NEGATIVE
LDLC SERPL CALC-MCNC: 49 MG/DL (ref 0–100)
LDLC/HDLC SERPL: 0.55 {RATIO}
LEUKOCYTE EST, POC: ABNORMAL
LYMPHOCYTES # BLD AUTO: 1.71 10*3/MM3 (ref 0.7–3.1)
LYMPHOCYTES NFR BLD AUTO: 36.3 % (ref 19.6–45.3)
Lab: ABNORMAL
MCH RBC QN AUTO: 28.1 PG (ref 26.6–33)
MCHC RBC AUTO-ENTMCNC: 32.5 G/DL (ref 31.5–35.7)
MCV RBC AUTO: 86.5 FL (ref 79–97)
MONOCYTES # BLD AUTO: 0.4 10*3/MM3 (ref 0.1–0.9)
MONOCYTES NFR BLD AUTO: 8.5 % (ref 5–12)
NEUTROPHILS # BLD AUTO: 2.48 10*3/MM3 (ref 1.7–7)
NEUTROPHILS NFR BLD AUTO: 52.7 % (ref 42.7–76)
NITRITE UR-MCNC: NEGATIVE MG/ML
NRBC BLD AUTO-RTO: 0 /100 WBC (ref 0–0.2)
PH UR: 7 [PH] (ref 5–8)
PLATELET # BLD AUTO: 221 10*3/MM3 (ref 140–450)
PMV BLD AUTO: 10.5 FL (ref 6–12)
POTASSIUM BLD-SCNC: 4.4 MMOL/L (ref 3.5–5.2)
PROT SERPL-MCNC: 7.3 G/DL (ref 6–8.5)
PROT UR STRIP-MCNC: NEGATIVE MG/DL
RBC # BLD AUTO: 4.66 10*6/MM3 (ref 3.77–5.28)
RBC # UR STRIP: NEGATIVE /UL
SODIUM BLD-SCNC: 140 MMOL/L (ref 136–145)
SP GR UR: 1.03 (ref 1–1.03)
T4 FREE SERPL-MCNC: 1.12 NG/DL (ref 0.93–1.7)
TRIGL SERPL-MCNC: 54 MG/DL (ref 0–150)
TSH SERPL DL<=0.05 MIU/L-ACNC: 1.78 UIU/ML (ref 0.27–4.2)
UROBILINOGEN UR QL: NORMAL
VLDLC SERPL-MCNC: 10.8 MG/DL (ref 5–40)
WBC NRBC COR # BLD: 4.71 10*3/MM3 (ref 3.4–10.8)

## 2020-06-18 PROCEDURE — 84439 ASSAY OF FREE THYROXINE: CPT | Performed by: PHYSICIAN ASSISTANT

## 2020-06-18 PROCEDURE — 99396 PREV VISIT EST AGE 40-64: CPT | Performed by: PHYSICIAN ASSISTANT

## 2020-06-18 PROCEDURE — 80053 COMPREHEN METABOLIC PANEL: CPT | Performed by: PHYSICIAN ASSISTANT

## 2020-06-18 PROCEDURE — 93000 ELECTROCARDIOGRAM COMPLETE: CPT | Performed by: PHYSICIAN ASSISTANT

## 2020-06-18 PROCEDURE — 84443 ASSAY THYROID STIM HORMONE: CPT | Performed by: PHYSICIAN ASSISTANT

## 2020-06-18 PROCEDURE — 85025 COMPLETE CBC W/AUTO DIFF WBC: CPT | Performed by: PHYSICIAN ASSISTANT

## 2020-06-18 PROCEDURE — 81003 URINALYSIS AUTO W/O SCOPE: CPT | Performed by: PHYSICIAN ASSISTANT

## 2020-06-18 PROCEDURE — 80061 LIPID PANEL: CPT | Performed by: PHYSICIAN ASSISTANT

## 2020-06-18 NOTE — PROGRESS NOTES
Chief Complaint   Patient presents with   • Annual Exam     fasting, w/o pap       Subjective       History of Present Illness     Jud Garnica is a 59 y.o. female. She presents for her annual physical. New concerns include heart palpitations. Patient reports heart palpitations 2-4x/ day with sensation of extra heart beats and tightness in her chest. These episodes last for 3-5 seconds before resolving. She has difficulty getting a deep breath during these episodes, but denies SOA or BROOKS outside of these episodes. She denies chest pain. She does have a heart murmur, which she states sometimes can be heard but was more distinct when she was a child. She does have asthma which is well-controlled and her current episodes do not feel like her normal asthma. She has not been using her inhaler routinely as she her asthma has been well-controlled without need for inhaler. She has tried her inhaler during heart palpitations without relief of sx. She has had an ECHO 3/19/2018 with trace mitral valve regurgitation, trace tricuspid valve regurgitation, trace pulmonic valve regurgitation and no other significant findings. She denies swelling of feet or ankles or fluid retention. She does not follow with a cardiologist.     Are you currently seeing any other doctors or specialists? No   Are you currently taking any OTC medications or herbal medications? Multivitamin, Vit B Complex, Vit E, Vit D 50    Sleep: 7 hours/ night   Diet: very healthy, per pt  Exercise: very active, no regular exercise routine due to pandemic     Most recent colonoscopy: 5/18/2018  Most recent mammogram: 6/21/2019  Most recent pap smear: 5/4/2018, repeat in 3 years  First day of last menses: n/a, hx partial hysterectomy (partial-- both ovaries intact)    Regular dental visits: Yes, UTD  Regular eye exams: Yes, UTD, wears glasses       PHQ-2 Depression Screening  Little interest or pleasure in doing things? 0   Feeling down, depressed, or hopeless? 0    PHQ-2 Total Score 0         The following portions of the patient's history were reviewed and updated as appropriate: allergies, current medications, past family history, past medical history, past social history, past surgical history and problem list.    Allergies   Allergen Reactions   • Codeine Irritability   • Nyquil Multi-Symptom [Pseudoeph-Doxylamine-Dm-Apap] Irritability     Social History     Tobacco Use   • Smoking status: Never Smoker   • Smokeless tobacco: Never Used   Substance Use Topics   • Alcohol use: No     Past Surgical History:   Procedure Laterality Date   • BREAST BIOPSY Right 06/08/2018    US bx   • HYSTERECTOMY      age 29     Family History   Problem Relation Age of Onset   • Dementia Mother    • Cancer Father    • Heart attack Maternal Grandfather    • Anuerysm Maternal Grandfather    • Heart attack Paternal Grandmother    • Cancer Paternal Grandmother    • Dementia Paternal Grandfather    • Breast cancer Neg Hx    • Ovarian cancer Neg Hx          Current Outpatient Medications:   •  albuterol sulfate  (90 Base) MCG/ACT inhaler, Inhale 2 puffs Every 4 (Four) Hours As Needed for Wheezing., Disp: 1 inhaler, Rfl: 0  •  Multiple Vitamins-Minerals (MULTIVITAMINS/MINERALS ADULT PO), Take  by mouth., Disp: , Rfl:   •  ipratropium-albuterol (DUO-NEB) 0.5-2.5 mg/3 ml nebulizer, Take 3 mL by nebulization 4 (Four) Times a Day., Disp: 360 mL, Rfl: 0    Patient Active Problem List   Diagnosis   • Moderate persistent asthma without complication   • Multiple environmental allergies   • Occupational exposure to chemicals       Review of Systems   Constitutional: Negative for chills, fatigue, fever, unexpected weight gain and unexpected weight loss.   HENT: Negative for congestion, ear pain, sore throat, swollen glands and trouble swallowing.    Eyes: Negative for pain and visual disturbance.   Respiratory: Positive for chest tightness. Negative for cough, shortness of breath and wheezing.     Cardiovascular: Positive for palpitations. Negative for chest pain.   Gastrointestinal: Negative for abdominal pain, blood in stool, diarrhea, nausea and vomiting.   Genitourinary: Negative for breast lump, breast pain, dysuria and hematuria.   Musculoskeletal: Negative for back pain.   Skin: Negative for rash.   Allergic/Immunologic: Negative for immunocompromised state.   Neurological: Negative for dizziness, syncope, weakness, numbness and headache.   Psychiatric/Behavioral: Negative for depressed mood. The patient is not nervous/anxious.        Objective   Vitals:    06/18/20 1124   BP: 110/70   Pulse: 76   Resp: 16   Temp: 98 °F (36.7 °C)   SpO2: 99%     Physical Exam   Constitutional: She is oriented to person, place, and time. She appears well-developed and well-nourished.   HENT:   Head: Normocephalic and atraumatic.   Right Ear: Tympanic membrane, external ear and ear canal normal. No tenderness.   Left Ear: Tympanic membrane, external ear and ear canal normal. No tenderness.   Nose: Nose normal.   Mouth/Throat: Uvula is midline and oropharynx is clear and moist. No oral lesions.   Eyes: Pupils are equal, round, and reactive to light. Conjunctivae and EOM are normal. No scleral icterus.   Neck: Trachea normal and normal range of motion. Neck supple. Carotid bruit is not present. No thyroid mass and no thyromegaly present.   Cardiovascular: Normal rate, regular rhythm, normal heart sounds and normal pulses. Exam reveals no gallop.   No murmur heard.  Pulses:       Radial pulses are 2+ on the right side, and 2+ on the left side.        Dorsalis pedis pulses are 2+ on the right side, and 2+ on the left side.   No audible murmur on exam.    Pulmonary/Chest: Effort normal and breath sounds normal. She has no wheezes. She has no rales. She exhibits no tenderness and no deformity. Right breast exhibits no mass. Left breast exhibits no mass. No breast tenderness or discharge.   Abdominal: Soft. Bowel sounds are  normal. She exhibits no distension and no mass. There is no hepatosplenomegaly. There is no tenderness. There is no CVA tenderness.   Musculoskeletal: Normal range of motion. She exhibits no edema or deformity.   Lymphadenopathy:     She has no cervical adenopathy.     She has no axillary adenopathy.   Neurological: She is alert and oriented to person, place, and time. She has normal strength.   Skin: Skin is warm and dry. No rash noted.   No atypical nevi.    Psychiatric: She has a normal mood and affect. Her behavior is normal.   Vitals reviewed.          ECG 12 Lead  Date/Time: 6/18/2020 1:23 PM  Performed by: Arpita Batres PA-C  Authorized by: Arpita Batres PA-C   Comparison: compared with previous ECG from 1/4/2020  Comparison to previous ECG: Per machine read, possible LVH although I see no discernible difference in current EKG as compared to previous.   Rhythm: sinus rhythm  Rate: normal  QRS axis: normal    Clinical impression: non-specific ECG  Comments: Possible LVH, could be considered normal variant.               Assessment/Plan   Jud was seen today for annual exam.    Diagnoses and all orders for this visit:    Encounter for health maintenance examination  -     CBC & Differential  -     Comprehensive Metabolic Panel  -     Lipid Panel  -     POC Urinalysis Dipstick, Automated  -     T4, Free  -     TSH    Screening for lipid disorders  -     Lipid Panel    Moderate persistent asthma without complication    Heart palpitations  -     T4, Free  -     TSH  -     Holter Monitor - 48 Hour; Future      Will obtain holter monitor for new onset palpitations. She has had an ECHO in March 2018 with only trace regurgitation as noted in HPI. Further plans after review of labs and holter study.   Continue routine medications.          Patient education discussed during this visit:  - avoidance of texting while driving and the need for wearing seatbelt  - use of sunscreen  - healthy sleep habits and  appropriate amount of sleep  - H2O consumption, well-balanced diet  - exercise routine which includes at least 150 minutes of cardio per week + muscle strengthening exercises  - immunizations including annual flu vaccination      Return in about 6 months (around 12/18/2020) for Follow up.

## 2020-06-22 ENCOUNTER — TELEPHONE (OUTPATIENT)
Dept: INTERNAL MEDICINE | Facility: CLINIC | Age: 60
End: 2020-06-22

## 2020-06-22 NOTE — TELEPHONE ENCOUNTER
Jud Garnica [phone]  Spoke to pt, advised of clinical message. Pt is in agreement with plan. Good verbal understanding.

## 2020-06-22 NOTE — TELEPHONE ENCOUNTER
"Please call pt- all labs stable/ unremarkable. HDL or \"good cholesterol\" was elevated, similar to last year. Otherwise labs WNL. Will await holter study for further investigation of palpitations.   "

## 2020-06-29 NOTE — PROGRESS NOTES
[facility]  [facility] Val[facility]      06/30/2020         Jud Garnica  [facility] [facility] [address]  [unfilled]    1960    Arpita Batres PA-C    Jud Garnica is a 59 y.o. female.      Subjective:     Chief Complaint:  Palpitations and Establish Care       HPI   59-year-old female with history of asthma and remote history of uterine cancer who presents today for evaluation of palpitations at the request of Arpita Batres PA-C.  Patient reports several month history of intermittent palpitations that occur occasionally. None this week.  She describes this as a fluttering sensation.   Episodes typically last 3 to 5 seconds before spontaneously resolving.Only happens at rest. She is raising a 3 year old grandson and is active without exertional symptoms. She does have chronic BROOKS, which she relates to asthma. She denies any exertional chest pain. She notes occasional chest tightness only at rest, which is brief.     Cardiac risks: age  Patient Active Problem List   Diagnosis   • Moderate persistent asthma without complication   • Multiple environmental allergies   • Occupational exposure to chemicals       Past Medical History:   Diagnosis Date   • Asthma    • Pneumonia 2018   • Uterine cancer (CMS/HCC)        Past Surgical History:   Procedure Laterality Date   • BREAST BIOPSY Right 06/08/2018    US bx   • HYSTERECTOMY      age 29, partial       Family History   Problem Relation Age of Onset   • Dementia Mother    • Cancer Father    • Heart attack Maternal Grandfather    • Anuerysm Maternal Grandfather    • Heart attack Paternal Grandmother    • Cancer Paternal Grandmother    • Dementia Paternal Grandfather    • Breast cancer Neg Hx    • Ovarian cancer Neg Hx        Social History     Socioeconomic History   • Marital status:      Spouse name: Not on file   • Number of children: Not on file   • Years of education: Not on file   • Highest education level: Not on  file   Tobacco Use   • Smoking status: Never Smoker   • Smokeless tobacco: Never Used   Substance and Sexual Activity   • Alcohol use: No   • Drug use: No   • Sexual activity: Defer   Social History Narrative    Caffeine? None       Allergies   Allergen Reactions   • Codeine Irritability   • Nyquil Multi-Symptom [Pseudoeph-Doxylamine-Dm-Apap] Irritability         Current Outpatient Medications:   •  albuterol sulfate  (90 Base) MCG/ACT inhaler, Inhale 2 puffs Every 4 (Four) Hours As Needed for Wheezing., Disp: 1 inhaler, Rfl: 0  •  ipratropium-albuterol (DUO-NEB) 0.5-2.5 mg/3 ml nebulizer, Take 3 mL by nebulization 4 (Four) Times a Day., Disp: 360 mL, Rfl: 0  •  Multiple Vitamins-Minerals (MULTIVITAMINS/MINERALS ADULT PO), Take  by mouth., Disp: , Rfl:     The following portions of the patient's history were reviewed today and updated as appropriate: allergies, current medications, past family history, past medical history, past social history, past surgical history and problem list     Review of Systems   Constitution: Negative for chills and fever.   HENT: Negative.    Eyes: Negative.    Cardiovascular: Positive for palpitations. Negative for chest pain, claudication, cyanosis, dyspnea on exertion, irregular heartbeat, leg swelling, near-syncope, orthopnea, paroxysmal nocturnal dyspnea and syncope.   Respiratory: Negative for cough, shortness of breath and snoring.    Endocrine: Negative.    Hematologic/Lymphatic: Does not bruise/bleed easily.   Skin: Negative for poor wound healing.   Musculoskeletal: Negative.    Gastrointestinal: Negative for abdominal pain, heartburn, hematemesis, melena, nausea and vomiting.   Genitourinary: Negative.  Negative for hematuria.   Neurological: Negative.    Psychiatric/Behavioral: Negative.    Allergic/Immunologic: Negative.        Objective:     Vitals:    06/30/20 0825 06/30/20 0826 06/30/20 0827   BP: 104/64 108/63 112/64   BP Location: Left arm Left arm Right arm  "  Patient Position: Standing Sitting Sitting   Cuff Size: Adult Adult Adult   Pulse: 85 71 69   Resp:   20   Temp:   97.8 °F (36.6 °C)   TempSrc:   Temporal   SpO2: 99% 99% 99%   Weight:   60.4 kg (133 lb 2 oz)   Height:   170.2 cm (67\")       Body mass index is 20.85 kg/m².    Physical Exam   Constitutional: She is oriented to person, place, and time. She appears well-developed and well-nourished. No distress.   HENT:   Head: Normocephalic.   Eyes: Pupils are equal, round, and reactive to light. Conjunctivae are normal.   Neck: Neck supple. No JVD present. No thyromegaly present.   Cardiovascular: Normal rate, regular rhythm, normal heart sounds and intact distal pulses. Exam reveals no gallop and no friction rub.   No murmur heard.  Pulmonary/Chest: Effort normal and breath sounds normal. No respiratory distress. She has no wheezes. She has no rales. She exhibits no tenderness.   Abdominal: Soft. Bowel sounds are normal.   Musculoskeletal: Normal range of motion. She exhibits no edema.   Neurological: She is alert and oriented to person, place, and time.   Skin: Skin is warm and dry.   Psychiatric: She has a normal mood and affect. Her behavior is normal. Thought content normal.   Vitals reviewed.      Lab and Diagnostic Review:  Echo 3/19/18  · Left ventricular systolic function is normal.  · EF appears to be in the range of 56 - 60%.  Results for orders placed or performed in visit on 06/18/20   Comprehensive Metabolic Panel   Result Value Ref Range    Glucose 82 65 - 99 mg/dL    BUN 17 8 - 23 mg/dL    Creatinine 0.83 0.57 - 1.00 mg/dL    Sodium 140 136 - 145 mmol/L    Potassium 4.4 3.5 - 5.2 mmol/L    Chloride 105 98 - 107 mmol/L    CO2 25.2 22.0 - 29.0 mmol/L    Calcium 9.7 8.6 - 10.5 mg/dL    Total Protein 7.3 6.0 - 8.5 g/dL    Albumin 4.50 3.50 - 5.20 g/dL    ALT (SGPT) 16 1 - 33 U/L    AST (SGOT) 25 1 - 32 U/L    Alkaline Phosphatase 69 39 - 117 U/L    Total Bilirubin 1.6 (H) 0.2 - 1.2 mg/dL    eGFR Non " African Amer 70 >60 mL/min/1.73    Globulin 2.8 gm/dL    A/G Ratio 1.6 g/dL    BUN/Creatinine Ratio 20.5 7.0 - 25.0    Anion Gap 9.8 5.0 - 15.0 mmol/L   Lipid Panel   Result Value Ref Range    Total Cholesterol 150 0 - 200 mg/dL    Triglycerides 54 0 - 150 mg/dL    HDL Cholesterol 90 (H) 40 - 60 mg/dL    LDL Cholesterol  49 0 - 100 mg/dL    VLDL Cholesterol 10.8 5 - 40 mg/dL    LDL/HDL Ratio 0.55    T4, Free   Result Value Ref Range    Free T4 1.12 0.93 - 1.70 ng/dL   TSH   Result Value Ref Range    TSH 1.780 0.270 - 4.200 uIU/mL   CBC Auto Differential   Result Value Ref Range    WBC 4.71 3.40 - 10.80 10*3/mm3    RBC 4.66 3.77 - 5.28 10*6/mm3    Hemoglobin 13.1 12.0 - 15.9 g/dL    Hematocrit 40.3 34.0 - 46.6 %    MCV 86.5 79.0 - 97.0 fL    MCH 28.1 26.6 - 33.0 pg    MCHC 32.5 31.5 - 35.7 g/dL    RDW 13.5 12.3 - 15.4 %    RDW-SD 42.1 37.0 - 54.0 fl    MPV 10.5 6.0 - 12.0 fL    Platelets 221 140 - 450 10*3/mm3    Neutrophil % 52.7 42.7 - 76.0 %    Lymphocyte % 36.3 19.6 - 45.3 %    Monocyte % 8.5 5.0 - 12.0 %    Eosinophil % 1.9 0.3 - 6.2 %    Basophil % 0.4 0.0 - 1.5 %    Immature Grans % 0.2 0.0 - 0.5 %    Neutrophils, Absolute 2.48 1.70 - 7.00 10*3/mm3    Lymphocytes, Absolute 1.71 0.70 - 3.10 10*3/mm3    Monocytes, Absolute 0.40 0.10 - 0.90 10*3/mm3    Eosinophils, Absolute 0.09 0.00 - 0.40 10*3/mm3    Basophils, Absolute 0.02 0.00 - 0.20 10*3/mm3    Immature Grans, Absolute 0.01 0.00 - 0.05 10*3/mm3    nRBC 0.0 0.0 - 0.2 /100 WBC   POC Urinalysis Dipstick, Automated   Result Value Ref Range    Color Yellow Yellow, Straw, Dark Yellow, Alma Rosa    Clarity, UA Clear Clear    Specific Gravity  1.030 1.005 - 1.030    pH, Urine 7.0 5.0 - 8.0    Leukocytes 75 Gesron/ul (A) Negative    Nitrite, UA Negative Negative    Protein, POC Negative Negative mg/dL    Glucose, UA Negative Negative, 1000 mg/dL (3+) mg/dL    Ketones, UA Negative Negative    Urobilinogen, UA Normal Normal    Bilirubin Negative Negative    Blood, UA  Negative Negative    Lot Number [id number]     Expiration Date 11/30/20      EKG 6/18/2020 normal sinus rhythm, possible left atrial enlargement, possible LVH.  Heart rate 61.  Normal intervals    Assessment and Plan:   1. Heart palpitations  - Holter Monitor - 14 days    2. Chest pain, atypical  Only at rest and never with exertion. Low ASCVD. No need for ischemic evaluation unless holter monitor is abnormal    Video visit in 6 weeks          It has been a pleasure to participate in the care of this patient.  Patient was instructed to call the [facility] Center with any questions, concerns, or worsening symptoms.    *Please note that portions of this note were completed with a voice recognition program. Efforts were made to edit the dictations, but occasionally words are mistranscribed.

## 2020-06-30 ENCOUNTER — OFFICE VISIT (OUTPATIENT)
Dept: CARDIOLOGY | Facility: HOSPITAL | Age: 60
End: 2020-06-30

## 2020-06-30 ENCOUNTER — HOSPITAL ENCOUNTER (OUTPATIENT)
Dept: CARDIOLOGY | Facility: HOSPITAL | Age: 60
Discharge: HOME | End: 2020-06-30
Admitting: NURSE PRACTITIONER

## 2020-06-30 VITALS
TEMPERATURE: 97.8 F | SYSTOLIC BLOOD PRESSURE: 112 MMHG | HEART RATE: 69 BPM | OXYGEN SATURATION: 99 % | BODY MASS INDEX: 20.9 KG/M2 | RESPIRATION RATE: 20 BRPM | WEIGHT: 133.13 LBS | DIASTOLIC BLOOD PRESSURE: 64 MMHG | HEIGHT: 67 IN

## 2020-06-30 DIAGNOSIS — R00.2 HEART PALPITATIONS: Primary | ICD-10-CM

## 2020-06-30 DIAGNOSIS — R00.2 HEART PALPITATIONS: ICD-10-CM

## 2020-06-30 DIAGNOSIS — R07.89 CHEST PAIN, ATYPICAL: ICD-10-CM

## 2020-06-30 PROCEDURE — 0296T HC EXT ECG > 48HR TO 21 DAY RCRD W/CONECT INTL RCRD: CPT

## 2020-06-30 PROCEDURE — 99214 OFFICE O/P EST MOD 30 MIN: CPT | Performed by: NURSE PRACTITIONER

## 2020-06-30 NOTE — PROGRESS NOTES
[facility] Heart Monitor Documentation    Jud Garnica  1960  [id number]  06/30/20    NEERAJ [place]    [] ZIO XT Patch  Model [id number] Prescribed for  Days    · Serial Number: (N + 9 Digits) N   · Apply-By Date on Box:   · USPS Tracking Number:   · USPS Tracking        [] [employer]ian MINI PLUS Mobile Cardiac Telemetry  Model BGMINIPLUS Prescribed for  Days    · Serial Number: (BGM + 7 Digits) BGM  · Shipped-By Date on Box:   · UPS Tracking Number: [id number]  · UPS Tracking      [x] [employer] BodyGuardian MINI Holter Monitor  Model BGMINIEL Prescribed for 14 Days    · Serial Number: (7 Digits) [id number]   · Shipped-By Date on Box: 06/23/2020  · UPS Tracking Number: [id number]  · UPS Tracking        This monitor was applied to the patient's chest and checked for proper functioning.  Ms. Jud Garnica was instructed in the proper use of this monitor.  She was given the opportunity to ask questions and left the office with the device 's instruction manual.    Henna Shaikh [facility], 9:21 AM, 06/30/20                  [facility]

## 2020-07-09 ENCOUNTER — TELEPHONE (OUTPATIENT)
Dept: INTERNAL MEDICINE | Facility: CLINIC | Age: 60
End: 2020-07-09

## 2020-07-09 NOTE — TELEPHONE ENCOUNTER
Patient called saying that heart monitor that her PCP Arpita sent her to get is causing her skin to break out due to the tape that holds it up. [place] advise

## 2020-07-09 NOTE — TELEPHONE ENCOUNTER
If she can, call the number for the heart clinic as they can advise alternatives or if she needs to come back in to the office. I would avoid any topical creams or ointments at this time. She can take PO benadryl, but I doubt this will be sufficient. Since she is on a longer monitor, would advise to contact cardiology heart clinic who set this up for her.

## 2020-08-05 PROCEDURE — 0298T HOLTER MONITOR - 72 HOUR UP TO 21 DAY: CPT | Performed by: INTERNAL MEDICINE

## 2020-08-11 NOTE — PROGRESS NOTES
[facility]  Heart and Valve Center  Telemedicine note    08/12/2020         Jud Garnica  [address] [address]  [unfilled]    1960    Arpita Batres PA-C    Jud Garnica is a 59 y.o. female.      Subjective:     Chief Complaint:  Follow-up and Palpitations       This was an audio enabled telemedicine encounter. Patient unable to get logged into itravel    HPI   59-year-old female history of asthma and remote history of uterine cancer who presents today as a telemedicine follow-up on palpitations.  Patient wore extended Holter monitor which showed 29 runs of brief SVT with the longest lasting 12 beats.  No correlation with patient events.  Rare and short second-degree AV block type I. She reports having intermittent palpitations for years, usually when she is relaxed and watching TV and always brief. No worsening palpitations. Denies shortness of breath or chest pain. No dizziness or lightheadedness     Patient Active Problem List   Diagnosis   • Moderate persistent asthma without complication   • Multiple environmental allergies   • Occupational exposure to chemicals       Past Medical History:   Diagnosis Date   • Asthma    • Pneumonia 2018   • Uterine cancer (CMS/HCC)        Past Surgical History:   Procedure Laterality Date   • BREAST BIOPSY Right 06/08/2018    US bx   • HYSTERECTOMY      age 29, partial       Family History   Problem Relation Age of Onset   • Dementia Mother    • Cancer Father    • Heart attack Maternal Grandfather    • Anuerysm Maternal Grandfather    • Heart attack Paternal Grandmother    • Cancer Paternal Grandmother    • Dementia Paternal Grandfather    • Breast cancer Neg Hx    • Ovarian cancer Neg Hx        Social History     Socioeconomic History   • Marital status:      Spouse name: Not on file   • Number of children: Not on file   • Years of education: Not on file   • Highest education level: Not on file   Tobacco Use   •  Smoking status: Never Smoker   • Smokeless tobacco: Never Used   Substance and Sexual Activity   • Alcohol use: No   • Drug use: No   • Sexual activity: Defer   Social History Narrative    Caffeine? None       Allergies   Allergen Reactions   • Codeine Irritability   • Nyquil Multi-Symptom [Pseudoeph-Doxylamine-Dm-Apap] Irritability         Current Outpatient Medications:   •  albuterol sulfate  (90 Base) MCG/ACT inhaler, Inhale 2 puffs Every 4 (Four) Hours As Needed for Wheezing., Disp: 1 inhaler, Rfl: 0  •  ipratropium-albuterol (DUO-NEB) 0.5-2.5 mg/3 ml nebulizer, Take 3 mL by nebulization 4 (Four) Times a Day., Disp: 360 mL, Rfl: 0  •  Multiple Vitamins-Minerals (MULTIVITAMINS/MINERALS ADULT PO), Take  by mouth., Disp: , Rfl:     The following portions of the patient's history were reviewed today and updated as appropriate: allergies, current medications, past family history, past medical history, past social history, past surgical history and problem list     Review of Systems   Constitution: Negative for chills and fever.   HENT: Negative.    Eyes: Negative.    Cardiovascular: Positive for palpitations. Negative for chest pain, claudication, cyanosis, dyspnea on exertion, irregular heartbeat, leg swelling, near-syncope, orthopnea, paroxysmal nocturnal dyspnea and syncope.   Respiratory: Negative for cough, shortness of breath and snoring.    Endocrine: Negative.    Hematologic/Lymphatic: Does not bruise/bleed easily.   Skin: Negative for poor wound healing.   Musculoskeletal: Negative.    Gastrointestinal: Negative for abdominal pain, heartburn, hematemesis, melena, nausea and vomiting.   Genitourinary: Negative.  Negative for hematuria.   Neurological: Negative.    Psychiatric/Behavioral: Negative.    Allergic/Immunologic: Negative.        Objective:     Vitals:    08/12/20 1108   BP: 110/68   Pulse: 77   Weight: 59 kg (130 lb)       Body mass index is 20.36 kg/m².    Physical Exam    Lab and  Diagnostic Review:  Results for orders placed or performed in visit on 06/18/20   Comprehensive Metabolic Panel   Result Value Ref Range    Glucose 82 65 - 99 mg/dL    BUN 17 8 - 23 mg/dL    Creatinine 0.83 0.57 - 1.00 mg/dL    Sodium 140 136 - 145 mmol/L    Potassium 4.4 3.5 - 5.2 mmol/L    Chloride 105 98 - 107 mmol/L    CO2 25.2 22.0 - 29.0 mmol/L    Calcium 9.7 8.6 - 10.5 mg/dL    Total Protein 7.3 6.0 - 8.5 g/dL    Albumin 4.50 3.50 - 5.20 g/dL    ALT (SGPT) 16 1 - 33 U/L    AST (SGOT) 25 1 - 32 U/L    Alkaline Phosphatase 69 39 - 117 U/L    Total Bilirubin 1.6 (H) 0.2 - 1.2 mg/dL    eGFR Non African Amer 70 >60 mL/min/1.73    Globulin 2.8 gm/dL    A/G Ratio 1.6 g/dL    BUN/Creatinine Ratio 20.5 7.0 - 25.0    Anion Gap 9.8 5.0 - 15.0 mmol/L   Lipid Panel   Result Value Ref Range    Total Cholesterol 150 0 - 200 mg/dL    Triglycerides 54 0 - 150 mg/dL    HDL Cholesterol 90 (H) 40 - 60 mg/dL    LDL Cholesterol  49 0 - 100 mg/dL    VLDL Cholesterol 10.8 5 - 40 mg/dL    LDL/HDL Ratio 0.55    T4, Free   Result Value Ref Range    Free T4 1.12 0.93 - 1.70 ng/dL   TSH   Result Value Ref Range    TSH 1.780 0.270 - 4.200 uIU/mL   CBC Auto Differential   Result Value Ref Range    WBC 4.71 3.40 - 10.80 10*3/mm3    RBC 4.66 3.77 - 5.28 10*6/mm3    Hemoglobin 13.1 12.0 - 15.9 g/dL    Hematocrit 40.3 34.0 - 46.6 %    MCV 86.5 79.0 - 97.0 fL    MCH 28.1 26.6 - 33.0 pg    MCHC 32.5 31.5 - 35.7 g/dL    RDW 13.5 12.3 - 15.4 %    RDW-SD 42.1 37.0 - 54.0 fl    MPV 10.5 6.0 - 12.0 fL    Platelets 221 140 - 450 10*3/mm3    Neutrophil % 52.7 42.7 - 76.0 %    Lymphocyte % 36.3 19.6 - 45.3 %    Monocyte % 8.5 5.0 - 12.0 %    Eosinophil % 1.9 0.3 - 6.2 %    Basophil % 0.4 0.0 - 1.5 %    Immature Grans % 0.2 0.0 - 0.5 %    Neutrophils, Absolute 2.48 1.70 - 7.00 10*3/mm3    Lymphocytes, Absolute 1.71 0.70 - 3.10 10*3/mm3    Monocytes, Absolute 0.40 0.10 - 0.90 10*3/mm3    Eosinophils, Absolute 0.09 0.00 - 0.40 10*3/mm3    Basophils,  Absolute 0.02 0.00 - 0.20 10*3/mm3    Immature Grans, Absolute 0.01 0.00 - 0.05 10*3/mm3    nRBC 0.0 0.0 - 0.2 /100 WBC   POC Urinalysis Dipstick, Automated   Result Value Ref Range    Color Yellow Yellow, Straw, Dark Yellow, Alma Rosa    Clarity, UA Clear Clear    Specific Gravity  1.030 1.005 - 1.030    pH, Urine 7.0 5.0 - 8.0    Leukocytes 75 Gerson/ul (A) Negative    Nitrite, UA Negative Negative    Protein, POC Negative Negative mg/dL    Glucose, UA Negative Negative, 1000 mg/dL (3+) mg/dL    Ketones, UA Negative Negative    Urobilinogen, UA Normal Normal    Bilirubin Negative Negative    Blood, UA Negative Negative    Lot Number [id number]     Expiration Date 11/30/20        Ended Holter monitor worn for almost 12 days showed an average heart rate of 85, minimum heart of 52 and maximal heart rate of 158 bpm.  There were 29 brief supraventricular runs with the longest lasting 12 beats.  There was no correlation with patient reported events.  There were rare PVCs and rare PACs.  Second-degree AV block type I was noted but was rare and brief  Assessment and Plan:   1. Paroxysmal SVT (supraventricular tachycardia)/palpitations  Rare and brief SVT runs.  Interestingly, she was not symptomatic with these when she triggered the monitor.  Triggered events appear to be more normal sinus rhythm.  Currently she has stable symptoms.  We did discuss possibly adding beta-blocker if symptoms worsen, for now she plans to monitor her symptoms and avoid any stimulants.  Since she has no other cardiac symptoms will defer any further cardiac evaluation but I have strongly advised her to call with any new or worsening symptoms      This visit has been rescheduled as a telephone visit to comply with patient safety concerns in accordance with CDC recommendations. Total time of discussion was 9 minutes.      It has been a pleasure to participate in the care of this patient.  Patient was instructed to call the [facility] with any  questions, concerns, or worsening symptoms.    *Please note that portions of this note were completed with a voice recognition program. Efforts were made to edit the dictations, but occasionally words are mistranscribed.

## 2020-08-12 ENCOUNTER — TELEMEDICINE (OUTPATIENT)
Dept: CARDIOLOGY | Facility: HOSPITAL | Age: 60
End: 2020-08-12

## 2020-08-12 VITALS
BODY MASS INDEX: 20.36 KG/M2 | DIASTOLIC BLOOD PRESSURE: 68 MMHG | SYSTOLIC BLOOD PRESSURE: 110 MMHG | HEART RATE: 77 BPM | WEIGHT: 130 LBS

## 2020-08-12 DIAGNOSIS — R00.2 HEART PALPITATIONS: ICD-10-CM

## 2020-08-12 DIAGNOSIS — I47.1 PAROXYSMAL SVT (SUPRAVENTRICULAR TACHYCARDIA) (HCC): Primary | ICD-10-CM

## 2020-08-12 PROCEDURE — 99441 PR PHYS/QHP TELEPHONE EVALUATION 5-10 MIN: CPT | Performed by: NURSE PRACTITIONER

## 2020-09-21 ENCOUNTER — OFFICE VISIT (OUTPATIENT)
Dept: INTERNAL MEDICINE | Facility: CLINIC | Age: 60
End: 2020-09-21

## 2020-09-21 VITALS
DIASTOLIC BLOOD PRESSURE: 70 MMHG | WEIGHT: 139.5 LBS | RESPIRATION RATE: 20 BRPM | TEMPERATURE: 97.8 F | HEART RATE: 76 BPM | SYSTOLIC BLOOD PRESSURE: 106 MMHG | BODY MASS INDEX: 21.85 KG/M2 | OXYGEN SATURATION: 98 %

## 2020-09-21 DIAGNOSIS — H92.01 OTALGIA OF RIGHT EAR: Primary | ICD-10-CM

## 2020-09-21 PROCEDURE — 99213 OFFICE O/P EST LOW 20 MIN: CPT | Performed by: INTERNAL MEDICINE

## 2020-09-23 NOTE — PROGRESS NOTES
Subjective   Jud Garnica is a 59 y.o. female.     History of Present Illness     The following portions of the patient's history were reviewed and updated as appropriate: allergies, current medications, past family history, past medical history, past social history, past surgical history and problem list.    Right ear pain (acute)  Duration 1 to 2 days  Symptoms: Patient says that for the past 1 to 2 days she has been having increasing pain in the right ear.  Patient denies any trauma to the ear, congestion, runny nose, cough, fever, chills, dizziness, or any other systemic symptoms.    Review of Systems   All other systems reviewed and are negative.      Objective   Physical Exam  Vitals signs and nursing note reviewed.   Constitutional:       Appearance: Normal appearance. She is normal weight.   HENT:      Head: Normocephalic and atraumatic.      Right Ear: Tympanic membrane, ear canal and external ear normal.      Left Ear: Tympanic membrane, ear canal and external ear normal.      Nose: Nose normal.      Mouth/Throat:      Mouth: Mucous membranes are moist.   Eyes:      Extraocular Movements: Extraocular movements intact.      Conjunctiva/sclera: Conjunctivae normal.      Pupils: Pupils are equal, round, and reactive to light.   Neck:      Musculoskeletal: Normal range of motion and neck supple.   Cardiovascular:      Rate and Rhythm: Normal rate.   Pulmonary:      Effort: Pulmonary effort is normal.   Neurological:      Mental Status: She is alert.           Assessment/Plan   Jud was seen today for earache.    Diagnoses and all orders for this visit:    Otalgia of right ear    Supportive care  Advance diet as tolerated with emphasis on hydration.  Monitor for signs for dehydration.  Continue with Tylenol and or Motrin for fever reduction and or pain control.  Return to clinic if symptoms do not improve.

## 2021-01-15 ENCOUNTER — OFFICE VISIT (OUTPATIENT)
Dept: INTERNAL MEDICINE | Facility: CLINIC | Age: 61
End: 2021-01-15

## 2021-01-15 VITALS
OXYGEN SATURATION: 96 % | RESPIRATION RATE: 16 BRPM | HEART RATE: 75 BPM | DIASTOLIC BLOOD PRESSURE: 70 MMHG | WEIGHT: 140.8 LBS | HEIGHT: 67 IN | BODY MASS INDEX: 22.1 KG/M2 | TEMPERATURE: 98 F | SYSTOLIC BLOOD PRESSURE: 110 MMHG

## 2021-01-15 DIAGNOSIS — J45.40 MODERATE PERSISTENT ASTHMA WITHOUT COMPLICATION: Primary | ICD-10-CM

## 2021-01-15 PROCEDURE — 99213 OFFICE O/P EST LOW 20 MIN: CPT | Performed by: PHYSICIAN ASSISTANT

## 2021-01-15 NOTE — PROGRESS NOTES
Chief Complaint   Patient presents with   • Asthma     6 month F/U       Subjective       History of Present Illness     Jud Garnica is a 60 y.o. female. She presents for follow up of asthma. Pt states her asthma is under excellent control and has only needed her inhaler a few times in recent months. Of note, has not been working since March 2020 as she has been taking care of her grandson at home. She denies any cough, wheezing, SOA, chest tightness of other concerns today. She has a nebulizer which she also has not needed to use in nearly a year. She has no additional acute concerns today.     The following portions of the patient's history were reviewed and updated as appropriate: allergies, current medications, past medical history, past social history and problem list.    Allergies   Allergen Reactions   • Codeine Irritability   • Nyquil Multi-Symptom [Pseudoeph-Doxylamine-Dm-Apap] Irritability     Social History     Tobacco Use   • Smoking status: Never Smoker   • Smokeless tobacco: Never Used   Substance Use Topics   • Alcohol use: No         Current Outpatient Medications:   •  albuterol sulfate  (90 Base) MCG/ACT inhaler, Inhale 2 puffs Every 4 (Four) Hours As Needed for Wheezing., Disp: 1 inhaler, Rfl: 0  •  ipratropium-albuterol (DUO-NEB) 0.5-2.5 mg/3 ml nebulizer, Take 3 mL by nebulization 4 (Four) Times a Day., Disp: 360 mL, Rfl: 0  •  Multiple Vitamins-Minerals (MULTIVITAMINS/MINERALS ADULT PO), Take  by mouth., Disp: , Rfl:     Review of Systems   Constitutional: Negative for chills, fatigue and fever.   HENT: Negative for congestion, ear pain, sore throat and trouble swallowing.    Eyes: Negative for pain.   Respiratory: Negative for cough, chest tightness, shortness of breath and wheezing.    Cardiovascular: Negative for chest pain and palpitations.   Gastrointestinal: Negative for abdominal pain, nausea and vomiting.   Genitourinary: Negative for dysuria and hematuria.   Neurological:  Negative for dizziness and headache.       Objective   Vitals:    01/15/21 1330   BP: 110/70   Pulse: 75   Resp: 16   Temp: 98 °F (36.7 °C)   SpO2: 96%     Physical Exam  Constitutional:       Appearance: Normal appearance. She is well-developed.   HENT:      Head: Normocephalic and atraumatic.      Right Ear: Tympanic membrane, ear canal and external ear normal.      Left Ear: Tympanic membrane, ear canal and external ear normal.      Nose: Nose normal.      Mouth/Throat:      Mouth: Mucous membranes are moist.      Pharynx: Oropharynx is clear.   Eyes:      Conjunctiva/sclera: Conjunctivae normal.   Neck:      Musculoskeletal: Neck supple.   Cardiovascular:      Rate and Rhythm: Normal rate and regular rhythm.      Heart sounds: No murmur.   Pulmonary:      Effort: Pulmonary effort is normal.      Breath sounds: Normal breath sounds. No wheezing or rales.   Lymphadenopathy:      Cervical: No cervical adenopathy.   Skin:     Findings: No rash.   Psychiatric:         Behavior: Behavior normal.               Assessment/Plan   Diagnoses and all orders for this visit:    1. Moderate persistent asthma without complication (Primary)  - Continue rescue inhaler PRN. Nebs PRN.            Return in about 6 months (around 7/15/2021) for Annual physical.

## 2021-05-11 ENCOUNTER — OFFICE VISIT (OUTPATIENT)
Dept: INTERNAL MEDICINE | Facility: CLINIC | Age: 61
End: 2021-05-11

## 2021-05-11 VITALS
RESPIRATION RATE: 16 BRPM | TEMPERATURE: 97.8 F | WEIGHT: 138.4 LBS | OXYGEN SATURATION: 97 % | SYSTOLIC BLOOD PRESSURE: 124 MMHG | HEART RATE: 83 BPM | DIASTOLIC BLOOD PRESSURE: 68 MMHG | BODY MASS INDEX: 21.68 KG/M2

## 2021-05-11 DIAGNOSIS — R30.0 DYSURIA: ICD-10-CM

## 2021-05-11 DIAGNOSIS — R04.0 EPISTAXIS: ICD-10-CM

## 2021-05-11 DIAGNOSIS — R82.90 ABNORMAL URINE: Primary | ICD-10-CM

## 2021-05-11 LAB
BILIRUB BLD-MCNC: NEGATIVE MG/DL
CLARITY, POC: ABNORMAL
COLOR UR: YELLOW
EXPIRATION DATE: ABNORMAL
GLUCOSE UR STRIP-MCNC: NEGATIVE MG/DL
KETONES UR QL: NEGATIVE
LEUKOCYTE EST, POC: ABNORMAL
Lab: ABNORMAL
NITRITE UR-MCNC: POSITIVE MG/ML
PH UR: 6 [PH] (ref 5–8)
PROT UR STRIP-MCNC: NEGATIVE MG/DL
RBC # UR STRIP: ABNORMAL /UL
SP GR UR: 1.02 (ref 1–1.03)
UROBILINOGEN UR QL: NORMAL

## 2021-05-11 PROCEDURE — 81015 MICROSCOPIC EXAM OF URINE: CPT | Performed by: NURSE PRACTITIONER

## 2021-05-11 PROCEDURE — 87186 SC STD MICRODIL/AGAR DIL: CPT | Performed by: NURSE PRACTITIONER

## 2021-05-11 PROCEDURE — 99214 OFFICE O/P EST MOD 30 MIN: CPT | Performed by: NURSE PRACTITIONER

## 2021-05-11 PROCEDURE — 87086 URINE CULTURE/COLONY COUNT: CPT | Performed by: NURSE PRACTITIONER

## 2021-05-11 PROCEDURE — 87077 CULTURE AEROBIC IDENTIFY: CPT | Performed by: NURSE PRACTITIONER

## 2021-05-11 RX ORDER — NITROFURANTOIN 25; 75 MG/1; MG/1
100 CAPSULE ORAL 2 TIMES DAILY
Qty: 14 CAPSULE | Refills: 0 | Status: SHIPPED | OUTPATIENT
Start: 2021-05-11 | End: 2021-07-15

## 2021-05-11 NOTE — PROGRESS NOTES
Chief Complaint  Urinary Tract Infection (x1wk. No other Sx. Smell) and Nose Bleed (Rt side of nose. x5wks)    Subjective          Jud Garnica presents to [facility]  History of Present Illness    Patient is here today with concern for urinary tract infection.  She had a 1 week history of malodorous urine.  No frequency hesitancy urgency Positive dysuria no visible hematuria.      She is also had nosebleed on the right side of her nose over the last 5 weeks.weekly occurs and today for 3 hours  No nosebleeds as a child.       Review of systems no headache dizziness passing out chest pain shortness of breath swelling no new lumps or bumps or rashes no abdominal symptoms no change in bowel bladder habits positive for epistaxis positive for malodorous urine.    Objective   Vital Signs:   /68 (BP Location: Right arm, Patient Position: Sitting, Cuff Size: Adult)   Pulse 83   Temp 97.8 °F (36.6 °C) (Temporal)   Resp 16   Wt 62.8 kg (138 lb 6.4 oz)   SpO2 97%   BMI 21.68 kg/m²     Physical Exam  Vitals and nursing note reviewed.   Constitutional:       Appearance: She is well-developed.   HENT:      Head: Normocephalic and atraumatic.      Right Ear: External ear normal.      Left Ear: External ear normal.      Nose: Nose normal.      Comments: Unable to visualize source of bleeding from nares bilaterally.  Neck:      Thyroid: No thyromegaly.   Cardiovascular:      Rate and Rhythm: Normal rate and regular rhythm.   Pulmonary:      Effort: Pulmonary effort is normal.      Breath sounds: Normal breath sounds.   Abdominal:      General: Bowel sounds are normal. There is no distension.      Palpations: Abdomen is soft.      Tenderness: There is no abdominal tenderness.   Lymphadenopathy:      Cervical: No cervical adenopathy.   Skin:     Capillary Refill: Capillary refill takes 2 to 3 seconds.      Coloration: Skin is not pale.   Neurological:      Mental  Status: She is alert and oriented to person, place, and time.   Psychiatric:         Behavior: Behavior normal.        Result Review :{Labs  Result Review  Imaging  Med Tab  Media :23}                 Assessment and Plan    Diagnoses and all orders for this visit:    1. Abnormal urine (Primary)  -     Urine Culture - Urine, Urine, Random Void  -     Urinalysis, Microscopic Only - Urine, Clean Catch    2. Epistaxis  -     Ambulatory Referral to ENT (Otolaryngology)  -     CBC & Differential; Future  -     External Prothrombin Time; Future  -     APTT; Future    3. Dysuria  -     POC Urinalysis Dipstick, Automated    Other orders  -     nitrofurantoin, macrocrystal-monohydrate, (Macrobid) 100 MG capsule; Take 1 capsule by mouth 2 (Two) Times a Day.  Dispense: 14 capsule; Refill: 0        5-week history of nosebleeds.  ENT for further evaluation unable to visualize actual bleed.  Until that time of encouraged to use nasal saline daily and Afrin as needed as directed.      Urine diptrace leukocytes.  Follow Up   Return if symptoms worsen or fail to improve.  Patient was given instructions and counseling regarding her condition or for health maintenance advice. Please see specific information pulled into the AVS if appropriate.     RTC/call  If symptoms worsen  Meds MOA and SE's reviewed and pt v/u

## 2021-05-11 NOTE — PATIENT INSTRUCTIONS
Nosebleed, Adult  A nosebleed is when blood comes out of the nose. Nosebleeds are common and can be caused by many things. They are usually not a sign of a serious medical problem.  Follow these instructions at home:  When you have a nosebleed:    · Sit down.  · Tilt your head a little forward.  · Follow these steps:  1. Pinch your nose with a clean towel or tissue.  2. Keep pinching your nose for 5 minutes. Do not let go.  3. After 5 minutes, let go of your nose.  4. If there is still bleeding, do these steps again. Keep doing these steps until the bleeding stops.  · Do not put tissues or other things in your nose to stop the bleeding.  · Avoid lying down or putting your head back.  · Use a nose spray decongestant as told by your doctor.  After a nosebleed:  · Try not to blow your nose or sniffle for several hours.  · Try not to strain, lift, or bend at the waist for several days.  · Aspirin and blood-thinning medicines make bleeding more likely. If you take these medicines:  ? Ask your doctor if you should stop taking them or if you should change how much you take.  ? Do not stop taking the medicine unless your doctor tells you to.  · If your nosebleed was caused by dryness, use over-the-counter saline nasal spray or gel and humidifier as told by your doctor. This will keep the inside of your nose moist and allow it to heal. If you need to use one of these products:  ? Choose one that is water-soluble.  ? Use only as much as you need and use it only as often as needed.  ? Do not lie down right away after you use it.  · If you get nosebleeds often, talk with your doctor about treatments. These may include:  ? Nasal cautery. A chemical swab or electrical device is used to lightly burn tiny blood vessels inside the nose. This helps stop or prevent nosebleeds.  ? Nasal packing. A gauze or other material is placed in the nose to keep constant pressure on the bleeding area.  Contact a doctor if:  · You have a  fever.  · You get nosebleeds often.  · You are getting nosebleeds more often than usual.  · You bruise very easily.  · You have something stuck in your nose.  · You have bleeding in your mouth.  · You vomit or cough up brown material.  · You get a nosebleed after you start a new medicine.  Get help right away if:  · You have a nosebleed after you fall or hurt your head.  · Your nosebleed does not go away after 20 minutes.  · You feel dizzy or weak.  · You have unusual bleeding from other parts of your body.  · You have unusual bruising on other parts of your body.  · You get sweaty.  · You vomit blood.  Summary  · Nosebleeds are common. They are usually not a sign of a serious medical problem.  · When you have a nosebleed, sit down and tilt your head a little forward. Pinch your nose with a clean tissue for 5 minutes.  · Use saline spray or saline gel and a humidifier as told by your doctor.  · Get help right away if your nosebleed does not go away after 20 minutes.  This information is not intended to replace advice given to you by your health care provider. Make sure you discuss any questions you have with your health care provider.  Document Revised: 10/15/2020 Document Reviewed: 10/15/2020  Elsevier Patient Education © 2021 [employer]

## 2021-05-12 ENCOUNTER — LAB (OUTPATIENT)
Dept: INTERNAL MEDICINE | Facility: CLINIC | Age: 61
End: 2021-05-12

## 2021-05-12 DIAGNOSIS — R04.0 EPISTAXIS: ICD-10-CM

## 2021-05-12 LAB
APTT PPP: 31.8 SECONDS (ref 22–39)
BACTERIA UR QL AUTO: ABNORMAL /HPF
BASOPHILS # BLD AUTO: 0.03 10*3/MM3 (ref 0–0.2)
BASOPHILS NFR BLD AUTO: 0.6 % (ref 0–1.5)
DEPRECATED RDW RBC AUTO: 39.9 FL (ref 37–54)
EOSINOPHIL # BLD AUTO: 0.08 10*3/MM3 (ref 0–0.4)
EOSINOPHIL NFR BLD AUTO: 1.6 % (ref 0.3–6.2)
ERYTHROCYTE [DISTWIDTH] IN BLOOD BY AUTOMATED COUNT: 13 % (ref 12.3–15.4)
HCT VFR BLD AUTO: 36.1 % (ref 34–46.6)
HGB BLD-MCNC: 11.9 G/DL (ref 12–15.9)
HYALINE CASTS UR QL AUTO: ABNORMAL /LPF
IMM GRANULOCYTES # BLD AUTO: 0.02 10*3/MM3 (ref 0–0.05)
IMM GRANULOCYTES NFR BLD AUTO: 0.4 % (ref 0–0.5)
INR PPP: 1.02 (ref 0.85–1.16)
LYMPHOCYTES # BLD AUTO: 1.39 10*3/MM3 (ref 0.7–3.1)
LYMPHOCYTES NFR BLD AUTO: 27.4 % (ref 19.6–45.3)
MCH RBC QN AUTO: 28.1 PG (ref 26.6–33)
MCHC RBC AUTO-ENTMCNC: 33 G/DL (ref 31.5–35.7)
MCV RBC AUTO: 85.3 FL (ref 79–97)
MONOCYTES # BLD AUTO: 0.54 10*3/MM3 (ref 0.1–0.9)
MONOCYTES NFR BLD AUTO: 10.7 % (ref 5–12)
NEUTROPHILS NFR BLD AUTO: 3.01 10*3/MM3 (ref 1.7–7)
NEUTROPHILS NFR BLD AUTO: 59.3 % (ref 42.7–76)
NRBC BLD AUTO-RTO: 0 /100 WBC (ref 0–0.2)
PLATELET # BLD AUTO: 249 10*3/MM3 (ref 140–450)
PMV BLD AUTO: 10 FL (ref 6–12)
PROTHROMBIN TIME: 13.1 SECONDS (ref 11.4–14.4)
RBC # BLD AUTO: 4.23 10*6/MM3 (ref 3.77–5.28)
RBC # UR: ABNORMAL /HPF
REF LAB TEST METHOD: ABNORMAL
SQUAMOUS #/AREA URNS HPF: ABNORMAL /HPF
WBC # BLD AUTO: 5.07 10*3/MM3 (ref 3.4–10.8)
WBC UR QL AUTO: ABNORMAL /HPF

## 2021-05-12 PROCEDURE — 85730 THROMBOPLASTIN TIME PARTIAL: CPT | Performed by: NURSE PRACTITIONER

## 2021-05-12 PROCEDURE — 85610 PROTHROMBIN TIME: CPT | Performed by: NURSE PRACTITIONER

## 2021-05-12 PROCEDURE — 36415 COLL VENOUS BLD VENIPUNCTURE: CPT | Performed by: NURSE PRACTITIONER

## 2021-05-12 PROCEDURE — 85025 COMPLETE CBC W/AUTO DIFF WBC: CPT | Performed by: NURSE PRACTITIONER

## 2021-05-13 LAB — BACTERIA SPEC AEROBE CULT: ABNORMAL

## 2021-07-15 ENCOUNTER — OFFICE VISIT (OUTPATIENT)
Dept: INTERNAL MEDICINE | Facility: CLINIC | Age: 61
End: 2021-07-15

## 2021-07-15 VITALS
WEIGHT: 137 LBS | RESPIRATION RATE: 15 BRPM | DIASTOLIC BLOOD PRESSURE: 82 MMHG | TEMPERATURE: 97.8 F | BODY MASS INDEX: 21.5 KG/M2 | HEIGHT: 67 IN | HEART RATE: 73 BPM | SYSTOLIC BLOOD PRESSURE: 110 MMHG | OXYGEN SATURATION: 98 %

## 2021-07-15 DIAGNOSIS — Z13.220 SCREENING FOR LIPID DISORDERS: ICD-10-CM

## 2021-07-15 DIAGNOSIS — D22.9 SUSPICIOUS NEVUS: ICD-10-CM

## 2021-07-15 DIAGNOSIS — Z00.00 ENCOUNTER FOR HEALTH MAINTENANCE EXAMINATION: Primary | ICD-10-CM

## 2021-07-15 LAB
BILIRUB BLD-MCNC: NEGATIVE MG/DL
CLARITY, POC: CLEAR
COLOR UR: YELLOW
EXPIRATION DATE: NORMAL
GLUCOSE UR STRIP-MCNC: NEGATIVE MG/DL
KETONES UR QL: NEGATIVE
LEUKOCYTE EST, POC: NEGATIVE
Lab: NORMAL
NITRITE UR-MCNC: NEGATIVE MG/ML
PH UR: 5 [PH] (ref 5–8)
PROT UR STRIP-MCNC: NEGATIVE MG/DL
RBC # UR STRIP: NEGATIVE /UL
SP GR UR: 1.02 (ref 1–1.03)
UROBILINOGEN UR QL: NORMAL

## 2021-07-15 PROCEDURE — 80061 LIPID PANEL: CPT | Performed by: PHYSICIAN ASSISTANT

## 2021-07-15 PROCEDURE — 99396 PREV VISIT EST AGE 40-64: CPT | Performed by: PHYSICIAN ASSISTANT

## 2021-07-15 PROCEDURE — 84443 ASSAY THYROID STIM HORMONE: CPT | Performed by: PHYSICIAN ASSISTANT

## 2021-07-15 PROCEDURE — 80053 COMPREHEN METABOLIC PANEL: CPT | Performed by: PHYSICIAN ASSISTANT

## 2021-07-15 PROCEDURE — 81003 URINALYSIS AUTO W/O SCOPE: CPT | Performed by: PHYSICIAN ASSISTANT

## 2021-07-15 NOTE — PROGRESS NOTES
"Chief Complaint   Patient presents with   • Annual Exam     Fasting   • Laceration     1 year, spot on left thigh, no pain, won't heal, scabs over, and returns       Subjective       History of Present Illness     Jud Garnica is a 60 y.o. female. She presents for her annual physical. Her only concern today is a \"scab\" on L thigh that won't heal. This is not painful. She denies any previous trauma or injury to the area.     Are you currently seeing any other doctors or specialists? No   Are you currently taking any OTC medications or herbal medications? Multivitamin, Vit B Complex, Vit E, Vit D 50     Sleep: 5 hours/ night   Diet: very healthy, per pt  Exercise: very active, no regular exercise routine due to pandemic      Most recent colonoscopy: 5/18/2018  Most recent mammogram: 6/21/2019  Most recent pap smear: 5/4/2018, repeat in 3 years  First day of last menses: n/a, hx partial hysterectomy (partial-- both ovaries intact)     Regular dental visits: Yes, UTD  Regular eye exams: Yes, UTD, wears glasses       PHQ-2 Depression Screening  Little interest or pleasure in doing things? 0   Feeling down, depressed, or hopeless? 0   PHQ-2 Total Score 0         The following portions of the patient's history were reviewed and updated as appropriate: allergies, current medications, past medical history, past social history, past surgical history and problem list.    Allergies   Allergen Reactions   • Codeine Irritability   • Nyquil Multi-Symptom [Pseudoeph-Doxylamine-Dm-Apap] Irritability   • Meclizine Other (See Comments)     \"Not sure, but knows it doesn't make her feel good\"     Social History     Tobacco Use   • Smoking status: Never Smoker   • Smokeless tobacco: Never Used   Substance Use Topics   • Alcohol use: No     Past Surgical History:   Procedure Laterality Date   • BREAST BIOPSY Right 06/08/2018    US bx   • HYSTERECTOMY      age 29, partial     Family History   Problem Relation Age of Onset   • Dementia " Mother    • Cancer Father    • Heart attack Maternal Grandfather    • Anuerysm Maternal Grandfather    • Heart attack Paternal Grandmother    • Cancer Paternal Grandmother    • Dementia Paternal Grandfather    • Breast cancer Neg Hx    • Ovarian cancer Neg Hx          Current Outpatient Medications:   •  albuterol sulfate  (90 Base) MCG/ACT inhaler, Inhale 2 puffs Every 4 (Four) Hours As Needed for Wheezing., Disp: 1 inhaler, Rfl: 0  •  ipratropium-albuterol (DUO-NEB) 0.5-2.5 mg/3 ml nebulizer, Take 3 mL by nebulization 4 (Four) Times a Day., Disp: 360 mL, Rfl: 0  •  Multiple Vitamins-Minerals (MULTIVITAMINS/MINERALS ADULT PO), Take  by mouth., Disp: , Rfl:     Patient Active Problem List   Diagnosis   • Moderate persistent asthma without complication   • Multiple environmental allergies   • Occupational exposure to chemicals       Review of Systems   Constitutional: Negative for chills, fatigue and fever.   HENT: Negative for congestion, ear pain, sore throat and trouble swallowing.    Eyes: Negative for pain.   Respiratory: Negative for cough, shortness of breath and wheezing.    Cardiovascular: Negative for chest pain and palpitations.   Gastrointestinal: Negative for abdominal pain, diarrhea, nausea and vomiting.   Endocrine: Negative for cold intolerance and heat intolerance.   Genitourinary: Negative for breast lump, breast pain, dysuria and hematuria.   Musculoskeletal: Negative for back pain.   Skin: Positive for skin lesions. Negative for rash.   Allergic/Immunologic: Negative for immunocompromised state.   Neurological: Negative for dizziness, syncope, weakness and headache.   Psychiatric/Behavioral: Negative for depressed mood. The patient is not nervous/anxious.        Objective   Vitals:    07/15/21 1058   BP: 110/82   Pulse: 73   Resp: 15   Temp: 97.8 °F (36.6 °C)   SpO2: 98%     Physical Exam  Vitals reviewed.   Constitutional:       Appearance: She is well-developed.   HENT:      Head:  Normocephalic and atraumatic.      Right Ear: Tympanic membrane, ear canal and external ear normal. No tenderness.      Left Ear: Tympanic membrane, ear canal and external ear normal. No tenderness.      Nose: Nose normal.      Mouth/Throat:      Mouth: Mucous membranes are moist. No oral lesions.      Pharynx: Oropharynx is clear. Uvula midline.   Eyes:      General: No scleral icterus.     Conjunctiva/sclera: Conjunctivae normal.      Pupils: Pupils are equal, round, and reactive to light.   Neck:      Thyroid: No thyroid mass or thyromegaly.      Vascular: No carotid bruit.      Trachea: Trachea normal.   Cardiovascular:      Rate and Rhythm: Normal rate and regular rhythm.      Pulses: Normal pulses.           Radial pulses are 2+ on the right side and 2+ on the left side.        Dorsalis pedis pulses are 2+ on the right side and 2+ on the left side.      Heart sounds: Normal heart sounds. No murmur heard.   No gallop.    Pulmonary:      Effort: Pulmonary effort is normal.      Breath sounds: Normal breath sounds. No wheezing or rales.   Chest:      Chest wall: No deformity or tenderness.      Breasts:         Right: No mass.         Left: No mass.   Abdominal:      General: Bowel sounds are normal. There is no distension.      Palpations: Abdomen is soft. There is no mass.      Tenderness: There is no abdominal tenderness.   Musculoskeletal:         General: No deformity. Normal range of motion.      Cervical back: Normal range of motion and neck supple.   Lymphadenopathy:      Cervical: No cervical adenopathy.   Skin:     General: Skin is warm and dry.      Findings: No rash.      Comments: Atypical nevus of L anterior thigh with centralized crusting and pink borders.    Neurological:      Mental Status: She is alert and oriented to person, place, and time.   Psychiatric:         Behavior: Behavior normal.               Assessment/Plan   Diagnoses and all orders for this visit:    1. Encounter for health  maintenance examination (Primary)  -     Comprehensive Metabolic Panel  -     Lipid Panel  -     TSH  -     POC Urinalysis Dipstick, Automated    2. Suspicious nevus  -     Ambulatory Referral to Dermatology    3. Screening for lipid disorders  -     Lipid Panel             Patient education discussed during this visit:  - avoidance of texting while driving and the need for wearing seatbelt  - use of sunscreen  - healthy sleep habits and appropriate amount of sleep  - H2O consumption, well-balanced diet  - exercise routine which includes at least 150 minutes of cardio per week + muscle strengthening exercises  - immunizations including annual flu vaccination      Return in about 1 year (around 7/15/2022) for Annual physical.

## 2021-07-16 LAB
ALBUMIN SERPL-MCNC: 4.2 G/DL (ref 3.5–5.2)
ALBUMIN/GLOB SERPL: 1.3 G/DL
ALP SERPL-CCNC: 84 U/L (ref 39–117)
ALT SERPL W P-5'-P-CCNC: 24 U/L (ref 1–33)
ANION GAP SERPL CALCULATED.3IONS-SCNC: 11.5 MMOL/L (ref 5–15)
AST SERPL-CCNC: 31 U/L (ref 1–32)
BILIRUB SERPL-MCNC: 1 MG/DL (ref 0–1.2)
BUN SERPL-MCNC: 14 MG/DL (ref 8–23)
BUN/CREAT SERPL: 16.7 (ref 7–25)
CALCIUM SPEC-SCNC: 9.2 MG/DL (ref 8.6–10.5)
CHLORIDE SERPL-SCNC: 104 MMOL/L (ref 98–107)
CHOLEST SERPL-MCNC: 163 MG/DL (ref 0–200)
CO2 SERPL-SCNC: 25.5 MMOL/L (ref 22–29)
CREAT SERPL-MCNC: 0.84 MG/DL (ref 0.57–1)
GFR SERPL CREATININE-BSD FRML MDRD: 69 ML/MIN/1.73
GLOBULIN UR ELPH-MCNC: 3.2 GM/DL
GLUCOSE SERPL-MCNC: 82 MG/DL (ref 65–99)
HDLC SERPL-MCNC: 99 MG/DL (ref 40–60)
LDLC SERPL CALC-MCNC: 55 MG/DL (ref 0–100)
LDLC/HDLC SERPL: 0.57 {RATIO}
POTASSIUM SERPL-SCNC: 4.1 MMOL/L (ref 3.5–5.2)
PROT SERPL-MCNC: 7.4 G/DL (ref 6–8.5)
SODIUM SERPL-SCNC: 141 MMOL/L (ref 136–145)
TRIGL SERPL-MCNC: 40 MG/DL (ref 0–150)
TSH SERPL DL<=0.05 MIU/L-ACNC: 1.08 UIU/ML (ref 0.27–4.2)
VLDLC SERPL-MCNC: 9 MG/DL (ref 5–40)

## 2021-07-26 ENCOUNTER — TELEPHONE (OUTPATIENT)
Dept: INTERNAL MEDICINE | Facility: CLINIC | Age: 61
End: 2021-07-26

## 2021-07-26 NOTE — TELEPHONE ENCOUNTER
Caller: Jud Garnica    Relationship: Self    Best call back number: [phone]    What specialty or service is being requested: DERMATOLOGY    What is the office location: ANYWHERE CLOSE TO PATIENT'S HOME    Any additional details: PATIENT CALLED DERMATOLOGIST THAT WAS REFERRED TO HER AND THEY DO NOT HAVE ANY OPENINGS UNTIL NOVEMBER. PATIENT WOULD LIKE TO KNOW IF THERE IS A DIFFERENT DERMATOLOGIST THAT COULD GET HER IN SOONER. PLEASE ADVISE.

## 2022-04-29 ENCOUNTER — OFFICE VISIT (OUTPATIENT)
Dept: INTERNAL MEDICINE | Facility: CLINIC | Age: 62
End: 2022-04-29

## 2022-04-29 DIAGNOSIS — N30.01 ACUTE CYSTITIS WITH HEMATURIA: Primary | ICD-10-CM

## 2022-04-29 PROCEDURE — 81001 URINALYSIS AUTO W/SCOPE: CPT | Performed by: NURSE PRACTITIONER

## 2022-04-29 PROCEDURE — 87086 URINE CULTURE/COLONY COUNT: CPT | Performed by: NURSE PRACTITIONER

## 2022-04-29 PROCEDURE — 99213 OFFICE O/P EST LOW 20 MIN: CPT | Performed by: NURSE PRACTITIONER

## 2022-04-29 PROCEDURE — 87077 CULTURE AEROBIC IDENTIFY: CPT | Performed by: NURSE PRACTITIONER

## 2022-04-29 PROCEDURE — 87186 SC STD MICRODIL/AGAR DIL: CPT | Performed by: NURSE PRACTITIONER

## 2022-04-29 RX ORDER — NITROFURANTOIN 25; 75 MG/1; MG/1
100 CAPSULE ORAL 2 TIMES DAILY
Qty: 14 CAPSULE | Refills: 0 | Status: SHIPPED | OUTPATIENT
Start: 2022-04-29 | End: 2022-07-21

## 2022-04-30 LAB
BACTERIA UR QL AUTO: ABNORMAL /HPF
BILIRUB UR QL STRIP: NEGATIVE
CLARITY UR: ABNORMAL
COD CRY URNS QL: ABNORMAL /HPF
COLOR UR: ABNORMAL
GLUCOSE UR STRIP-MCNC: NEGATIVE MG/DL
HGB UR QL STRIP.AUTO: NEGATIVE
HYALINE CASTS UR QL AUTO: ABNORMAL /LPF
KETONES UR QL STRIP: NEGATIVE
LEUKOCYTE ESTERASE UR QL STRIP.AUTO: ABNORMAL
NITRITE UR QL STRIP: POSITIVE
PH UR STRIP.AUTO: 6 [PH] (ref 5–8)
PROT UR QL STRIP: NEGATIVE
RBC # UR STRIP: ABNORMAL /HPF
REF LAB TEST METHOD: ABNORMAL
SP GR UR STRIP: 1.02 (ref 1–1.03)
SQUAMOUS #/AREA URNS HPF: ABNORMAL /HPF
UROBILINOGEN UR QL STRIP: ABNORMAL
WBC # UR STRIP: ABNORMAL /HPF

## 2022-05-02 LAB — BACTERIA SPEC AEROBE CULT: ABNORMAL

## 2022-05-09 ENCOUNTER — TELEPHONE (OUTPATIENT)
Dept: INTERNAL MEDICINE | Facility: CLINIC | Age: 62
End: 2022-05-09

## 2022-05-09 NOTE — TELEPHONE ENCOUNTER
Caller: Jud Garnica    Relationship: Self    Best call back number: [phone]    When was the test performed: 04/29/2022    Additional notes:   PATIENT WOULD LIKE A CALL BACK REGARDING THE LAB RESULTS 04/29/2022

## 2022-05-10 NOTE — TELEPHONE ENCOUNTER
Please call patient regarding her questions.  She was seen on 4/29/2022 had a urinary tract infection was treated with Macrobid.  On urine culture it notes that the Macrobid was sensitive to the bacteria the patient was growing.  Please see if she is having any further symptoms.

## 2022-05-17 NOTE — TELEPHONE ENCOUNTER
Caller: Jud Garnica    Relationship: Self    Best call back number: [phone]    What was the call regarding:   PATIENT A CALL BACK REGARDING QUESTION'S SHE HAS ABOUT HER LABS DRAWN ON 04/29/2022    Do you require a callback: YES

## 2022-05-17 NOTE — TELEPHONE ENCOUNTER
Message sent 1 week ago with information noted below and patient was called with information. Please see note informatin in this message from last week.      The patient had issue with alcohol abuse.  She was also having problems with diarrhea.  She was started on BuSpar for anxiety and alcohol abuse.  She was in a Suboxone treatment center and was to meet with them in regards to the alcohol use.  She had a C. difficile which was negative at evaluate for the diarrhea

## 2022-07-21 ENCOUNTER — OFFICE VISIT (OUTPATIENT)
Dept: INTERNAL MEDICINE | Facility: CLINIC | Age: 62
End: 2022-07-21

## 2022-07-21 VITALS
OXYGEN SATURATION: 97 % | DIASTOLIC BLOOD PRESSURE: 64 MMHG | SYSTOLIC BLOOD PRESSURE: 124 MMHG | HEART RATE: 71 BPM | WEIGHT: 134.6 LBS | HEIGHT: 66 IN | BODY MASS INDEX: 21.63 KG/M2 | RESPIRATION RATE: 18 BRPM | TEMPERATURE: 98.7 F

## 2022-07-21 DIAGNOSIS — Z23 NEED FOR PNEUMOCOCCAL VACCINATION: ICD-10-CM

## 2022-07-21 DIAGNOSIS — G25.81 RESTLESS LEG SYNDROME: ICD-10-CM

## 2022-07-21 DIAGNOSIS — Z12.31 ENCOUNTER FOR SCREENING MAMMOGRAM FOR MALIGNANT NEOPLASM OF BREAST: ICD-10-CM

## 2022-07-21 DIAGNOSIS — J45.40 MODERATE PERSISTENT ASTHMA WITHOUT COMPLICATION: ICD-10-CM

## 2022-07-21 DIAGNOSIS — Z00.00 ENCOUNTER FOR HEALTH MAINTENANCE EXAMINATION: Primary | ICD-10-CM

## 2022-07-21 DIAGNOSIS — Z12.4 PAP SMEAR FOR CERVICAL CANCER SCREENING: ICD-10-CM

## 2022-07-21 LAB
ALBUMIN SERPL-MCNC: 4.3 G/DL (ref 3.5–5.2)
ALBUMIN/GLOB SERPL: 1.7 G/DL
ALP SERPL-CCNC: 78 U/L (ref 39–117)
ALT SERPL W P-5'-P-CCNC: 19 U/L (ref 1–33)
ANION GAP SERPL CALCULATED.3IONS-SCNC: 9.8 MMOL/L (ref 5–15)
AST SERPL-CCNC: 27 U/L (ref 1–32)
BASOPHILS # BLD AUTO: 0.02 10*3/MM3 (ref 0–0.2)
BASOPHILS NFR BLD AUTO: 0.3 % (ref 0–1.5)
BILIRUB SERPL-MCNC: 1.2 MG/DL (ref 0–1.2)
BUN SERPL-MCNC: 12 MG/DL (ref 8–23)
BUN/CREAT SERPL: 12.8 (ref 7–25)
CALCIUM SPEC-SCNC: 9.7 MG/DL (ref 8.6–10.5)
CHLORIDE SERPL-SCNC: 106 MMOL/L (ref 98–107)
CHOLEST SERPL-MCNC: 153 MG/DL (ref 0–200)
CO2 SERPL-SCNC: 25.2 MMOL/L (ref 22–29)
CREAT SERPL-MCNC: 0.94 MG/DL (ref 0.57–1)
DEPRECATED RDW RBC AUTO: 42.7 FL (ref 37–54)
EGFRCR SERPLBLD CKD-EPI 2021: 68.7 ML/MIN/1.73
EOSINOPHIL # BLD AUTO: 0.11 10*3/MM3 (ref 0–0.4)
EOSINOPHIL NFR BLD AUTO: 1.9 % (ref 0.3–6.2)
ERYTHROCYTE [DISTWIDTH] IN BLOOD BY AUTOMATED COUNT: 13.1 % (ref 12.3–15.4)
GLOBULIN UR ELPH-MCNC: 2.6 GM/DL
GLUCOSE SERPL-MCNC: 77 MG/DL (ref 65–99)
HCT VFR BLD AUTO: 40.3 % (ref 34–46.6)
HDLC SERPL-MCNC: 91 MG/DL (ref 40–60)
HGB BLD-MCNC: 12.6 G/DL (ref 12–15.9)
IMM GRANULOCYTES # BLD AUTO: 0.01 10*3/MM3 (ref 0–0.05)
IMM GRANULOCYTES NFR BLD AUTO: 0.2 % (ref 0–0.5)
LDLC SERPL CALC-MCNC: 50 MG/DL (ref 0–100)
LDLC/HDLC SERPL: 0.55 {RATIO}
LYMPHOCYTES # BLD AUTO: 1.88 10*3/MM3 (ref 0.7–3.1)
LYMPHOCYTES NFR BLD AUTO: 31.9 % (ref 19.6–45.3)
MCH RBC QN AUTO: 27.8 PG (ref 26.6–33)
MCHC RBC AUTO-ENTMCNC: 31.3 G/DL (ref 31.5–35.7)
MCV RBC AUTO: 88.8 FL (ref 79–97)
MONOCYTES # BLD AUTO: 0.45 10*3/MM3 (ref 0.1–0.9)
MONOCYTES NFR BLD AUTO: 7.6 % (ref 5–12)
NEUTROPHILS NFR BLD AUTO: 3.43 10*3/MM3 (ref 1.7–7)
NEUTROPHILS NFR BLD AUTO: 58.1 % (ref 42.7–76)
NRBC BLD AUTO-RTO: 0 /100 WBC (ref 0–0.2)
PLATELET # BLD AUTO: 229 10*3/MM3 (ref 140–450)
PMV BLD AUTO: 9.6 FL (ref 6–12)
POTASSIUM SERPL-SCNC: 4 MMOL/L (ref 3.5–5.2)
PROT SERPL-MCNC: 6.9 G/DL (ref 6–8.5)
RBC # BLD AUTO: 4.54 10*6/MM3 (ref 3.77–5.28)
SODIUM SERPL-SCNC: 141 MMOL/L (ref 136–145)
TRIGL SERPL-MCNC: 58 MG/DL (ref 0–150)
VLDLC SERPL-MCNC: 12 MG/DL (ref 5–40)
WBC NRBC COR # BLD: 5.9 10*3/MM3 (ref 3.4–10.8)

## 2022-07-21 PROCEDURE — 80061 LIPID PANEL: CPT | Performed by: PHYSICIAN ASSISTANT

## 2022-07-21 PROCEDURE — 90677 PCV20 VACCINE IM: CPT | Performed by: PHYSICIAN ASSISTANT

## 2022-07-21 PROCEDURE — 99396 PREV VISIT EST AGE 40-64: CPT | Performed by: PHYSICIAN ASSISTANT

## 2022-07-21 PROCEDURE — 85025 COMPLETE CBC W/AUTO DIFF WBC: CPT | Performed by: PHYSICIAN ASSISTANT

## 2022-07-21 PROCEDURE — 90471 IMMUNIZATION ADMIN: CPT | Performed by: PHYSICIAN ASSISTANT

## 2022-07-21 PROCEDURE — 80053 COMPREHEN METABOLIC PANEL: CPT | Performed by: PHYSICIAN ASSISTANT

## 2022-07-25 ENCOUNTER — HOSPITAL ENCOUNTER (OUTPATIENT)
Dept: MAMMOGRAPHY | Facility: HOSPITAL | Age: 62
Discharge: HOME | End: 2022-07-25
Admitting: PHYSICIAN ASSISTANT

## 2022-07-25 DIAGNOSIS — Z12.31 ENCOUNTER FOR SCREENING MAMMOGRAM FOR MALIGNANT NEOPLASM OF BREAST: ICD-10-CM

## 2022-07-25 PROCEDURE — 77067 SCR MAMMO BI INCL CAD: CPT | Performed by: RADIOLOGY

## 2022-07-25 PROCEDURE — 77063 BREAST TOMOSYNTHESIS BI: CPT

## 2022-07-25 PROCEDURE — 77067 SCR MAMMO BI INCL CAD: CPT

## 2022-07-25 PROCEDURE — 77063 BREAST TOMOSYNTHESIS BI: CPT | Performed by: RADIOLOGY

## 2022-07-27 LAB — REF LAB TEST METHOD: NORMAL

## 2023-01-13 ENCOUNTER — TELEPHONE (OUTPATIENT)
Dept: INTERNAL MEDICINE | Facility: CLINIC | Age: 63
End: 2023-01-13
Payer: COMMERCIAL

## 2023-01-13 NOTE — TELEPHONE ENCOUNTER
Please call patient and have her schedule an appointment to evaluate her pain.  She can try supportive cares in the meantime elevate, ice, Tylenol or Advil if she can tolerate those.

## 2023-01-17 ENCOUNTER — OFFICE VISIT (OUTPATIENT)
Dept: INTERNAL MEDICINE | Facility: CLINIC | Age: 63
End: 2023-01-17
Payer: COMMERCIAL

## 2023-01-17 VITALS
DIASTOLIC BLOOD PRESSURE: 60 MMHG | SYSTOLIC BLOOD PRESSURE: 100 MMHG | HEART RATE: 90 BPM | WEIGHT: 133.25 LBS | TEMPERATURE: 97.8 F | RESPIRATION RATE: 20 BRPM | BODY MASS INDEX: 21.51 KG/M2 | OXYGEN SATURATION: 98 %

## 2023-01-17 DIAGNOSIS — R13.10 DYSPHAGIA, UNSPECIFIED TYPE: ICD-10-CM

## 2023-01-17 DIAGNOSIS — M79.645 THUMB PAIN, LEFT: Primary | ICD-10-CM

## 2023-01-17 DIAGNOSIS — Z23 ENCOUNTER FOR VACCINATION: ICD-10-CM

## 2023-01-17 PROCEDURE — 90686 IIV4 VACC NO PRSV 0.5 ML IM: CPT | Performed by: STUDENT IN AN ORGANIZED HEALTH CARE EDUCATION/TRAINING PROGRAM

## 2023-01-17 PROCEDURE — 90471 IMMUNIZATION ADMIN: CPT | Performed by: STUDENT IN AN ORGANIZED HEALTH CARE EDUCATION/TRAINING PROGRAM

## 2023-01-17 PROCEDURE — 99214 OFFICE O/P EST MOD 30 MIN: CPT | Performed by: STUDENT IN AN ORGANIZED HEALTH CARE EDUCATION/TRAINING PROGRAM

## 2023-01-17 NOTE — ASSESSMENT & PLAN NOTE
No alarm features such as weight loss, fevers, melena or hematochezia.  No history of GERD.  Suspicion for Schatzki ring.  Will refer to gastroenterology for consideration of EGD and possible dilation.

## 2023-01-17 NOTE — PROGRESS NOTES
Follow Up Office Visit      Date: 2023   Patient Name: Jud Garnica  : 1960   MRN: [id number]     Chief Complaint:    Chief Complaint   Patient presents with   • Hand Pain     Thumb pain        History of Present Illness: Jud Garnica is a 62 y.o. female who is here today for left thumb pain and trouble swallowing.    HPI   Left thumb pain  Patient reports approximately 4 to 5 days ago she noticed left thenar eminence pain and bruising with prominence of a superficial vein.  She states her hand was aching, but this improved with Tylenol.  Swelling and prominence of the vein has resolved but there is mild bruising still present.  Denies any injury to the area.  No history of blood clot.  Has history of uterine cancer which was surgically removed.  No history of gout or family history of rheumatoid arthritis.  She states she injured her left fifth toe in the past with multiple fractures and has had similar symptoms intermittently with that.  Denies any other digits involved and no effects from cold temperatures.    Dysphagia  Patient reports over the past few months she has noticed progressive dysphagia to solids such as chicken and bread reporting a feeling that it is getting stuck behind her chest.  Denies history of GERD or radiation to the chest.  No melena or hematochezia.  No early satiety or bloating.    Subjective      Review of Systems:   Review of Systems    I have reviewed the patients family history, social history, past medical history, past surgical history and have updated it as appropriate.     Medications:     Current Outpatient Medications:   •  albuterol sulfate  (90 Base) MCG/ACT inhaler, Inhale 2 puffs Every 4 (Four) Hours As Needed for Wheezing., Disp: 1 inhaler, Rfl: 0  •  ipratropium-albuterol (DUO-NEB) 0.5-2.5 mg/3 ml nebulizer, Take 3 mL by nebulization 4 (Four) Times a Day., Disp: 360 mL, Rfl: 0  •  Multiple Vitamins-Minerals (MULTIVITAMINS/MINERALS ADULT  "PO), Take  by mouth., Disp: , Rfl:     Allergies:   Allergies   Allergen Reactions   • Codeine Irritability   • Nyquil Multi-Symptom [Pseudoeph-Doxylamine-Dm-Apap] Irritability   • Meclizine Other (See Comments) and GI Intolerance     \"Not sure, but knows it doesn't make her feel good\"       Objective     Physical Exam: Please see above  Vital Signs:   Vitals:    01/17/23 0849   BP: 100/60   BP Location: Left arm   Patient Position: Sitting   Cuff Size: Adult   Pulse: 90   Resp: 20   Temp: 97.8 °F (36.6 °C)   TempSrc: Temporal   SpO2: 98%   Weight: 60.4 kg (133 lb 4 oz)   PainSc: 0-No pain     Body mass index is 21.51 kg/m².    Physical Exam  Vitals reviewed.   Constitutional:       General: She is not in acute distress.     Appearance: Normal appearance. She is normal weight. She is not ill-appearing or toxic-appearing.   HENT:      Head: Normocephalic and atraumatic.      Right Ear: External ear normal.      Left Ear: External ear normal.   Eyes:      General: No scleral icterus.     Extraocular Movements: Extraocular movements intact.      Pupils: Pupils are equal, round, and reactive to light.   Cardiovascular:      Rate and Rhythm: Normal rate and regular rhythm.      Pulses: Normal pulses.      Heart sounds: Normal heart sounds. No murmur heard.    No friction rub. No gallop.   Pulmonary:      Effort: Pulmonary effort is normal. No respiratory distress.      Breath sounds: Normal breath sounds. No stridor. No wheezing, rhonchi or rales.   Abdominal:      General: Abdomen is flat. Bowel sounds are normal. There is no distension.      Palpations: Abdomen is soft.   Musculoskeletal:         General: Tenderness (mild tenderness over left 1st MCP) present. No swelling or signs of injury. Normal range of motion.      Cervical back: Normal range of motion. No rigidity.      Comments: No swelling, erythema or warmth. Mild ecchymoses to thenar eminence   Skin:     General: Skin is warm and dry.      Capillary Refill: " Capillary refill takes less than 2 seconds.      Findings: No erythema or rash.   Neurological:      General: No focal deficit present.      Mental Status: She is alert and oriented to person, place, and time.   Psychiatric:         Mood and Affect: Mood normal.         Behavior: Behavior normal.         Judgment: Judgment normal.         Procedures    Results:   Labs:   TSH   Date Value Ref Range Status   07/15/2021 1.080 0.270 - 4.200 uIU/mL Final        Imaging:   No valid procedures specified.     Assessment / Plan      Assessment/Plan:   62-year-old female with history of environmental allergies, remote history of uterine cancer status post surgical removal, asthma who presents with left thumb pain.  Unclear etiology based on history, patient was concerned about thrombosis though there is no swelling of the arm and patient with minimal risk factors for blood clot.  Symptoms are improving with supportive care.  My suspicion is that there was some osteoarthritis of the MCP joint, unclear if there is injury, or possibly gout.  We discussed the possibility of obtaining basic lab work-up, but patient deferred at this time.  Encourage supportive care with icing, Tylenol.  Counseled patient to take photos if this recurs and make same-day appointment at that time.  Patient voiced understanding.      Problem List Items Addressed This Visit        Gastrointestinal Abdominal     Dysphagia    Overview     Sensation of solids stuck behind chest         Current Assessment & Plan     No alarm features such as weight loss, fevers, melena or hematochezia.  No history of GERD.  Suspicion for Schatzki ring.  Will refer to gastroenterology for consideration of EGD and possible dilation.         Relevant Orders    Ambulatory Referral to Gastroenterology   Other Visit Diagnoses     Thumb pain, left    -  Primary    Encounter for vaccination        Relevant Orders    FluLaval/Fluarix/Fluzone >6 Months (Completed)            Follow  Up:   Return if symptoms worsen or fail to improve.      Dov Leon [place]  [facility] [place]

## 2023-02-17 ENCOUNTER — OUTSIDE FACILITY SERVICE (OUTPATIENT)
Dept: GASTROENTEROLOGY | Facility: CLINIC | Age: 63
End: 2023-02-17
Payer: COMMERCIAL

## 2023-02-17 DIAGNOSIS — K21.00 GASTROESOPHAGEAL REFLUX DISEASE WITH ESOPHAGITIS WITHOUT HEMORRHAGE: Primary | ICD-10-CM

## 2023-02-17 PROCEDURE — 43249 ESOPH EGD DILATION <30 MM: CPT | Performed by: INTERNAL MEDICINE

## 2023-02-17 PROCEDURE — 43239 EGD BIOPSY SINGLE/MULTIPLE: CPT | Performed by: INTERNAL MEDICINE

## 2023-02-17 RX ORDER — ESOMEPRAZOLE MAGNESIUM 40 MG/1
40 CAPSULE, DELAYED RELEASE ORAL DAILY
Qty: 30 CAPSULE | Refills: 5 | Status: SHIPPED | OUTPATIENT
Start: 2023-02-17

## 2023-04-25 ENCOUNTER — OFFICE VISIT (OUTPATIENT)
Dept: INTERNAL MEDICINE | Facility: CLINIC | Age: 63
End: 2023-04-25
Payer: COMMERCIAL

## 2023-04-25 ENCOUNTER — HOSPITAL ENCOUNTER (OUTPATIENT)
Dept: GENERAL RADIOLOGY | Facility: HOSPITAL | Age: 63
Discharge: HOME | End: 2023-04-25
Admitting: PHYSICIAN ASSISTANT
Payer: COMMERCIAL

## 2023-04-25 VITALS
DIASTOLIC BLOOD PRESSURE: 78 MMHG | WEIGHT: 140.13 LBS | SYSTOLIC BLOOD PRESSURE: 100 MMHG | RESPIRATION RATE: 20 BRPM | BODY MASS INDEX: 22.62 KG/M2 | HEART RATE: 80 BPM | TEMPERATURE: 97.8 F

## 2023-04-25 DIAGNOSIS — M25.551 RIGHT HIP PAIN: Primary | ICD-10-CM

## 2023-04-25 DIAGNOSIS — M25.551 RIGHT HIP PAIN: ICD-10-CM

## 2023-04-25 PROCEDURE — 73502 X-RAY EXAM HIP UNI 2-3 VIEWS: CPT

## 2023-04-25 NOTE — PROGRESS NOTES
"    Office Note     Name: Jud Garnica    : 1960     MRN: [id number]     Chief Complaint  Hip Pain (Right side )    Subjective     History of Present Illness:  Jud Garnica is a 62 y.o. female who presents today for right hip pain.  Patient reports that she is having right hip pain that is radiating into her thigh and into her groin.  She reports she symptoms as a \"popping\" and \"catching\" feeling in her right hip.  She reports that sometimes this occurs when she is getting up from seated position.  She denies any back pain.  She denies any known injury.  She reports she has been taking Tylenol twice daily with some relief.  She reports she cannot take Motrin due to epistaxis.  She denies any additional symptoms.      Review of Systems:   Review of Systems    Past Medical History:   Past Medical History:   Diagnosis Date   • Asthma    • Pneumonia    • Uterine cancer        Past Surgical History:   Past Surgical History:   Procedure Laterality Date   • BREAST BIOPSY Right 2018    US bx   • HYSTERECTOMY      age 29, partial       Immunizations:   Immunization History   Administered Date(s) Administered   • COVID-19 (MODERNA) 1st,2nd,3rd Dose Monovalent 2021, 10/18/2021   • FluLaval/Fluzone >6mos 2023   • Hepatitis A 2019   • Pneumococcal Conjugate 20-Valent (PCV20) 2022   • Tdap 2018        Medications:     Current Outpatient Medications:   •  albuterol sulfate  (90 Base) MCG/ACT inhaler, Inhale 2 puffs Every 4 (Four) Hours As Needed for Wheezing., Disp: 1 inhaler, Rfl: 0  •  esomeprazole (nexIUM) 40 MG capsule, Take 1 capsule by mouth Daily., Disp: 30 capsule, Rfl: 5  •  ipratropium-albuterol (DUO-NEB) 0.5-2.5 mg/3 ml nebulizer, Take 3 mL by nebulization 4 (Four) Times a Day., Disp: 360 mL, Rfl: 0  •  Multiple Vitamins-Minerals (MULTIVITAMINS/MINERALS ADULT PO), Take  by mouth., Disp: , Rfl:     Allergies:   Allergies   Allergen Reactions   • Codeine " "Irritability   • Nyquil Multi-Symptom [Pseudoeph-Doxylamine-Dm-Apap] Irritability   • Meclizine Other (See Comments) and GI Intolerance     \"Not sure, but knows it doesn't make her feel good\"       Family History:   Family History   Problem Relation Age of Onset   • Dementia Mother    • Cancer Father         Lung   • Breast cancer Sister    • Heart attack Paternal Grandmother    • Cancer Paternal Grandmother         lung   • Heart attack Maternal Grandfather    • Anuerysm Maternal Grandfather    • Dementia Paternal Grandfather    • Ovarian cancer Neg Hx        Social History:   Social History     Socioeconomic History   • Marital status:    Tobacco Use   • Smoking status: Never   • Smokeless tobacco: Never   Substance and Sexual Activity   • Alcohol use: No   • Drug use: Never   • Sexual activity: Yes     Partners: Male         Objective     Vital Signs  /78 (BP Location: Right arm, Patient Position: Sitting, Cuff Size: Adult)   Pulse 80   Temp 97.8 °F (36.6 °C) (Temporal)   Resp 20   Wt 63.6 kg (140 lb 2 oz)   BMI 22.62 kg/m²   Estimated body mass index is 22.62 kg/m² as calculated from the following:    Height as of 7/21/22: 167.6 cm (66\").    Weight as of this encounter: 63.6 kg (140 lb 2 oz).    BMI is within normal parameters. No other follow-up for BMI required.      Physical Exam  Vitals and nursing note reviewed.   Constitutional:       Appearance: Normal appearance.   Cardiovascular:      Rate and Rhythm: Normal rate and regular rhythm.      Pulses: Normal pulses.      Heart sounds: Normal heart sounds.   Pulmonary:      Effort: Pulmonary effort is normal.      Breath sounds: Normal breath sounds.   Musculoskeletal:      Right hip: Tenderness present. No deformity, lacerations, bony tenderness or crepitus. Normal range of motion. Normal strength.      Left hip: Normal.      Comments: Hip pain with external rotation and worse pain with internal rotation.  No pain with straight leg raise "   Skin:     General: Skin is warm and dry.   Neurological:      Mental Status: She is alert.      Motor: Motor function is intact.      Coordination: Coordination is intact.      Gait: Gait is intact.   Psychiatric:         Mood and Affect: Mood normal.         Behavior: Behavior normal.          Assessment and Plan     1. Right hip pain    - XR Hip With or Without Pelvis 2 - 3 View Right; Future  - Ambulatory Referral to Physical Therapy Evaluate and treat       Follow Up  Return in about 6 weeks (around 6/6/2023) for Recheck right hip pain .    TROY Aragon [facility]  [address]  [facility] [address] [phone]

## 2023-04-27 DIAGNOSIS — M25.551 RIGHT HIP PAIN: ICD-10-CM

## 2023-04-27 DIAGNOSIS — M16.10 HIP ARTHRITIS: Primary | ICD-10-CM

## 2023-05-02 ENCOUNTER — OFFICE VISIT (OUTPATIENT)
Dept: ORTHOPEDIC SURGERY | Facility: CLINIC | Age: 63
End: 2023-05-02
Payer: COMMERCIAL

## 2023-05-02 VITALS
HEIGHT: 67 IN | SYSTOLIC BLOOD PRESSURE: 115 MMHG | DIASTOLIC BLOOD PRESSURE: 75 MMHG | WEIGHT: 139 LBS | BODY MASS INDEX: 21.82 KG/M2

## 2023-05-02 DIAGNOSIS — M16.11 PRIMARY OSTEOARTHRITIS OF RIGHT HIP: ICD-10-CM

## 2023-05-02 DIAGNOSIS — M70.61 TROCHANTERIC BURSITIS OF RIGHT HIP: Primary | ICD-10-CM

## 2023-05-02 RX ORDER — TRIAMCINOLONE ACETONIDE 40 MG/ML
80 INJECTION, SUSPENSION INTRA-ARTICULAR; INTRAMUSCULAR
Status: COMPLETED | OUTPATIENT
Start: 2023-05-02 | End: 2023-05-02

## 2023-05-02 RX ORDER — LIDOCAINE HYDROCHLORIDE 10 MG/ML
3 INJECTION, SOLUTION EPIDURAL; INFILTRATION; INTRACAUDAL; PERINEURAL
Status: COMPLETED | OUTPATIENT
Start: 2023-05-02 | End: 2023-05-02

## 2023-05-02 RX ORDER — BUPIVACAINE HYDROCHLORIDE 2.5 MG/ML
3 INJECTION, SOLUTION EPIDURAL; INFILTRATION; INTRACAUDAL
Status: COMPLETED | OUTPATIENT
Start: 2023-05-02 | End: 2023-05-02

## 2023-05-02 RX ADMIN — TRIAMCINOLONE ACETONIDE 80 MG: 40 INJECTION, SUSPENSION INTRA-ARTICULAR; INTRAMUSCULAR at 08:14

## 2023-05-02 RX ADMIN — BUPIVACAINE HYDROCHLORIDE 3 ML: 2.5 INJECTION, SOLUTION EPIDURAL; INFILTRATION; INTRACAUDAL at 08:14

## 2023-05-02 RX ADMIN — LIDOCAINE HYDROCHLORIDE 3 ML: 10 INJECTION, SOLUTION EPIDURAL; INFILTRATION; INTRACAUDAL; PERINEURAL at 08:14

## 2023-05-02 NOTE — PROGRESS NOTES
Procedure   Large Joint Arthrocentesis: R greater trochanteric bursa  Date/Time: 5/2/2023 8:14 AM  Consent given by: patient  Site marked: site marked  Timeout: Immediately prior to procedure a time out was called to verify the correct patient, procedure, equipment, support staff and site/side marked as required   Supporting Documentation  Indications: pain   Procedure Details  Location: hip - R greater trochanteric bursa  Preparation: Patient was prepped and draped in the usual sterile fashion  Needle gauge: 21G.  Approach: lateral  Medications administered: 3 mL bupivacaine (PF) 0.25 %; 3 mL lidocaine PF 1% 1 %; 80 mg triamcinolone acetonide 40 MG/ML  Patient tolerance: patient tolerated the procedure well with no immediate complications

## 2023-05-02 NOTE — LETTER
May 2, 2023       No Recipients    Patient: Jud Garnica   YOB: 1960   Date of Visit: 5/2/2023       Dear Savita Amezquita PA-C:    Thank you for referring Jud Garnica to me for evaluation. Below are the relevant portions of my assessment and plan of care.    Encounter Diagnosis and Orders:  Diagnoses and all orders for this visit:    1. Trochanteric bursitis of right hip (Primary)  -     Large Joint Arthrocentesis: R greater trochanteric bursa  -     bupivacaine (PF) (MARCAINE) 0.25 % injection 3 mL  -     lidocaine PF 1% (XYLOCAINE) injection 3 mL  -     triamcinolone acetonide (KENALOG-40) injection 80 mg    2. Primary osteoarthritis of right hip        If you have questions, please do not hesitate to call me. I look forward to following Jud along with you.         Sincerely,        Dariusz Tom MD        CC:   No Recipients

## 2023-05-02 NOTE — PROGRESS NOTES
Orthopaedic Clinic Note: Hip New Patient    Chief Complaint   Patient presents with   • Right Hip - Pain        HPI  Consult from: VI Torres*    Jud Garnica is a 62 y.o. female who presents with right hip pain for 2 month(s). Onset atraumatic and gradual in nature. Pain is localized to lateral trochanter, groin and is a 8/10 on the pain scale.Pain is described as burning, throbbing, stabbing and shooting. Associated symptoms include pain, popping and giving way/buckling.  The pain is worse with walking, standing, sitting, climbing stairs, sleeping, working, leisure and lying on affected side; resting and pain medication and/or NSAID improve the pain. Previous treatments have included: nothing since symptom onset. Although some transient relief was reported with these interventions, these conservative measures have failed and symptoms have persisted. The patient is limited in daily activities and has had a significant decrease in quality of life as a result. She denies fevers, chills, or constitutional symptoms.    I have reviewed the following portions of the patient's history:History of Present Illness    Past Medical History:   Diagnosis Date   • Asthma    • Pneumonia 2018   • Uterine cancer       Past Surgical History:   Procedure Laterality Date   • BREAST BIOPSY Right 06/08/2018    US bx   • HYSTERECTOMY      age 29, partial      Family History   Problem Relation Age of Onset   • Dementia Mother    • Cancer Father         Lung   • Breast cancer Sister    • Heart attack Paternal Grandmother    • Cancer Paternal Grandmother         lung   • Heart attack Maternal Grandfather    • Anuerysm Maternal Grandfather    • Dementia Paternal Grandfather    • Ovarian cancer Neg Hx      Social History     Socioeconomic History   • Marital status:    Tobacco Use   • Smoking status: Never   • Smokeless tobacco: Never   Substance and Sexual Activity   • Alcohol use: No   • Drug use: Never   • Sexual  "activity: Yes     Partners: Male      Current Outpatient Medications on File Prior to Visit   Medication Sig Dispense Refill   • albuterol sulfate  (90 Base) MCG/ACT inhaler Inhale 2 puffs Every 4 (Four) Hours As Needed for Wheezing. 1 inhaler 0   • esomeprazole (nexIUM) 40 MG capsule Take 1 capsule by mouth Daily. 30 capsule 5   • ipratropium-albuterol (DUO-NEB) 0.5-2.5 mg/3 ml nebulizer Take 3 mL by nebulization 4 (Four) Times a Day. 360 mL 0   • Multiple Vitamins-Minerals (MULTIVITAMINS/MINERALS ADULT PO) Take  by mouth.       No current facility-administered medications on file prior to visit.      Allergies   Allergen Reactions   • Codeine Irritability   • Nyquil Multi-Symptom [Pseudoeph-Doxylamine-Dm-Apap] Irritability   • Meclizine Other (See Comments) and GI Intolerance     \"Not sure, but knows it doesn't make her feel good\"        Review of Systems   Constitutional: Negative.    HENT: Negative.    Eyes: Negative.    Respiratory: Negative.    Cardiovascular: Negative.    Gastrointestinal: Negative.    Endocrine: Negative.    Genitourinary: Negative.    Musculoskeletal: Positive for arthralgias.   Skin: Negative.    Allergic/Immunologic: Negative.    Neurological: Negative.    Hematological: Negative.    Psychiatric/Behavioral: Negative.         The patient's Review of Systems was personally reviewed and confirmed as accurate.    The following portions of the patient's history were reviewed and updated as appropriate: allergies, current medications, past family history, past medical history, past social history, past surgical history and problem list.    Physical Exam  Blood pressure 115/75, height 170.2 cm (67\"), weight 63 kg (139 lb), not currently breastfeeding.    Body mass index is 21.77 kg/m².    GENERAL APPEARANCE: awake, alert & oriented x 3, in no acute distress and well developed, well nourished  PSYCH: normal affect  LUNGS:  breathing nonlabored  EYES: sclera anicteric  CARDIOVASCULAR: " palpable dorsalis pedis, palpable posterior tibial bilaterally. Capillary refill less than 2 seconds  EXTREMITIES: no clubbing, cyanosis  GAIT:  Normal           Right Hip Exam:  RANGE OF MOTION:   FLEXION CONTRACTURE: None   FLEXION: 110 degrees   INTERNAL ROTATION: 10 degrees at 90 degrees of flexion   EXTERNAL ROTATION: 40 degrees at 90 degrees of flexion    PAIN WITH HIP MOTION: yes  PAIN WITH LOGROLL: no  STINCHFIELD TEST: negative    KNEE EXAM: full knee ROM (0-120 degrees), stable to varus and valgus stress at terminal extension and 30 degrees flexion     STRENGTH:  5/5 hip adduction, abduction, flexion. 5/5 strength knee flexion, extension. 5/5 strength ankle dorsiflexion and plantarflexion.     GREATER TROCHANTER BURSAL PAIN:  no     REFLEXES:   PATELLAR 2+/4   ACHILLES 2+/4    CLONUS: negative  STRAIGHT LEG TEST:   negative    SENSATION TO LIGHT TOUCH:  DEEP PERONEAL/SUPERFICIAL PERONEAL/SURAL/SAPHENOUS/TIBIAL:  intact    EDEMA:   no  ERYTHEMA:  no  WOUNDS/INCISIONS: no overlying skin problems.      Left Hip Exam:   RANGE OF MOTION:   FLEXION CONTRACTURE: None   FLEXION: 110 degrees   INTERNAL ROTATION: 20 degrees at 90 degrees of flexion   EXTERNAL ROTATION: 40 degrees at 90 degrees of flexion    PAIN WITH HIP MOTION: no  PAIN WITH LOGROLL: no  STINCHFIELD TEST: negative    KNEE EXAM: full knee ROM (0-120 degrees), stable to varus and valgus stress at terminal extension and 30 degrees flexion     STRENGTH:  5/5 hip adduction, abduction, flexion. 5/5 strength knee flexion, extension. 5/5 strength ankle dorsiflexion and plantarflexion.     GREATER TROCHANTER BURSAL PAIN:  no     REFLEXES:   PATELLAR 2+/4   ACHILLES 2+/4    CLONUS: negative  STRAIGHT LEG TEST:   negative    SENSATION TO LIGHT TOUCH:  DEEP PERONEAL/SUPERFICIAL PERONEAL/SURAL/SAPHENOUS/TIBIAL:  intact    EDEMA:   no  ERYTHEMA:  no  WOUNDS/INCISIONS: no overlying skin  problems.      ------------------------------------------------------------------    LEG LENGTHS:  equal  _____________________________________________________  _____________________________________________________    RADIOGRAPHIC FINDINGS:   AP pelvis and right hip radiographs from 4/25/2023 were personally interpreted.  Radiographs demonstrate mild to moderate hip arthritis of the bilateral hips with slight joint space narrowing and periarticular osteophyte formation.  No acute bony injury or fracture.    Assessment/Plan:   Diagnosis Plan   1. Trochanteric bursitis of right hip        2. Primary osteoarthritis of right hip          Patient suffering from hip osteoarthritis as well as trochanteric bursitis.  I discussed treatment options with her regarding her ongoing hip pain.  She is agreeable to initiating conservative treatment and getting more aggressive as necessary.  I discussed prescription anti-inflammatory versus trochanteric bursa cortisone injection.  She wishes to pursue the injection as anti-inflammatories have previously resulted in epistaxis.  She will follow-up in 3 weeks for repeat assessment.    Procedure Note:  I discussed with the patient the potential benefits of performing a therapeutic injection of the right hip trochanteric bursa as well as potential risks including but not limited to infection, swelling, pain, bleeding, bruising, nerve/vessel damage, skin color changes, transient elevation in blood glucose levels, and fat atrophy. After informed consent and verifying correct patient, procedure site, and type of procedure, the area was prepped with alcohol, ethyl chloride was used to numb the skin. Via the direct lateral approach, 3 cc of 1% lidocaine, 3 cc of 0.25% Marcaine and 2 cc of 40mg/ml of Kenalog were injected into the right hip trochanteric bursa. The patient tolerated the procedure well. There were no complications. A sterile dressing was placed over the injection  site.      Dariusz Tom MD  05/02/23  08:17 EDT

## 2023-05-25 ENCOUNTER — OFFICE VISIT (OUTPATIENT)
Dept: ORTHOPEDIC SURGERY | Facility: CLINIC | Age: 63
End: 2023-05-25
Payer: COMMERCIAL

## 2023-05-25 VITALS
HEIGHT: 67 IN | WEIGHT: 139 LBS | SYSTOLIC BLOOD PRESSURE: 110 MMHG | DIASTOLIC BLOOD PRESSURE: 70 MMHG | BODY MASS INDEX: 21.82 KG/M2

## 2023-05-25 DIAGNOSIS — M16.11 PRIMARY OSTEOARTHRITIS OF RIGHT HIP: ICD-10-CM

## 2023-05-25 DIAGNOSIS — M70.61 TROCHANTERIC BURSITIS OF RIGHT HIP: Primary | ICD-10-CM

## 2023-05-25 PROCEDURE — 1159F MED LIST DOCD IN RCRD: CPT | Performed by: ORTHOPAEDIC SURGERY

## 2023-05-25 PROCEDURE — 99212 OFFICE O/P EST SF 10 MIN: CPT | Performed by: ORTHOPAEDIC SURGERY

## 2023-05-25 PROCEDURE — 1160F RVW MEDS BY RX/DR IN RCRD: CPT | Performed by: ORTHOPAEDIC SURGERY

## 2023-05-25 NOTE — PROGRESS NOTES
Orthopaedic Clinic Note: Hip Established Patient    Chief Complaint   Patient presents with   • Follow-up     3 week follow up -- Trochanteric bursitis of right hip         HPI    It has been 3  week(s) since Ms. Garnica's last visit. She returns to clinic today for follow-up right hip pain.  She underwent trochanteric bursa cortisone injection 3 weeks ago.  The injection provided nearly 100% relief.  She currently rates her pain 1/10 on the pain scale.  Ambulating with no assistive device.  Denies fevers chills or constitutional symptoms.  Overall she is doing better.    Past Medical History:   Diagnosis Date   • Asthma    • Bursitis of hip April 2023    Right hip   • Hip arthrosis April 2023    Rigth hip   • Pneumonia 2018   • Scoliosis 1998    Can't remember the correct date   • Uterine cancer       Past Surgical History:   Procedure Laterality Date   • BREAST BIOPSY Right 06/08/2018    US bx   • HYSTERECTOMY      age 29, partial      Family History   Problem Relation Age of Onset   • Dementia Mother    • Cancer Father         Lung   • Breast cancer Sister    • Heart attack Paternal Grandmother    • Cancer Paternal Grandmother         lung   • Heart attack Maternal Grandfather    • Anuerysm Maternal Grandfather    • Dementia Paternal Grandfather    • Ovarian cancer Neg Hx      Social History     Socioeconomic History   • Marital status:    Tobacco Use   • Smoking status: Never   • Smokeless tobacco: Never   Substance and Sexual Activity   • Alcohol use: No   • Drug use: Never   • Sexual activity: Yes     Partners: Male     Birth control/protection: Hysterectomy     Comment: Partial Hysterectomy      Current Outpatient Medications on File Prior to Visit   Medication Sig Dispense Refill   • albuterol sulfate  (90 Base) MCG/ACT inhaler Inhale 2 puffs Every 4 (Four) Hours As Needed for Wheezing. 1 inhaler 0   • esomeprazole (nexIUM) 40 MG capsule Take 1 capsule by mouth Daily. 30 capsule 5   •  "ipratropium-albuterol (DUO-NEB) 0.5-2.5 mg/3 ml nebulizer Take 3 mL by nebulization 4 (Four) Times a Day. 360 mL 0   • Multiple Vitamins-Minerals (MULTIVITAMINS/MINERALS ADULT PO) Take  by mouth.       No current facility-administered medications on file prior to visit.      Allergies   Allergen Reactions   • Codeine Irritability   • Nyquil Multi-Symptom [Pseudoeph-Doxylamine-Dm-Apap] Irritability   • Meclizine Other (See Comments) and GI Intolerance     \"Not sure, but knows it doesn't make her feel good\"        Review of Systems   Constitutional: Negative.    HENT: Negative.    Eyes: Negative.    Respiratory: Negative.    Cardiovascular: Negative.    Gastrointestinal: Negative.    Endocrine: Negative.    Genitourinary: Negative.    Musculoskeletal: Positive for arthralgias.   Skin: Negative.    Allergic/Immunologic: Negative.    Neurological: Negative.    Hematological: Negative.    Psychiatric/Behavioral: Negative.         The patient's Review of Systems was personally reviewed and confirmed as accurate.    Physical Exam  Blood pressure 110/70, height 170.2 cm (67.01\"), weight 63 kg (139 lb), not currently breastfeeding.    Body mass index is 21.77 kg/m².    GENERAL APPEARANCE: awake, alert, oriented, in no acute distress and well developed, well nourished  LUNGS:  breathing nonlabored  EXTREMITIES: no clubbing, cyanosis  PERIPHERAL PULSES: palpable dorsalis pedis and posterior tibial pulses bilaterally.    GAIT:  Normal            Hip Exam:  Right    RANGE OF MOTION:  EXTENSION/FLEXION:  normal (0-110 degrees)  IR (at 90 degrees of flexion):  20  ER (at 90 degrees of flexion):  40  PAIN WITH HIP MOTION:  no  PAIN WITH LOGROLL:  no     STINCHFIELD TEST: negative    STRENGTH:  ABDUCTOR:  5/5  ADDUCTOR:  5/5  HIP FLEXION:  5/5    GREATER TROCHANTER BURSAL PAIN:  no    SENSATION TO LIGHT TOUCH:  DEEP PERONEAL/SUPERFICIAL PERONEAL/SURAL/SAPHENOUS/TIBIAL:   intact    EDEMA:  no  ERYTHEMA:  no  WOUNDS/INCISIONS:   " no  _________________________________________________________________  _________________________________________________________________    RADIOGRAPHIC FINDINGS:   No new imaging today    Assessment/Plan:   Diagnosis Plan   1. Trochanteric bursitis of right hip        2. Primary osteoarthritis of right hip          Patient symptoms are greatly improved.  No further intervention warranted at this time.  Patient follow-up as needed.    Dariusz Tom MD  05/25/23  09:43 EDT

## 2023-06-06 ENCOUNTER — OFFICE VISIT (OUTPATIENT)
Dept: INTERNAL MEDICINE | Facility: CLINIC | Age: 63
End: 2023-06-06
Payer: COMMERCIAL

## 2023-06-06 VITALS
BODY MASS INDEX: 20.85 KG/M2 | RESPIRATION RATE: 20 BRPM | TEMPERATURE: 97.5 F | DIASTOLIC BLOOD PRESSURE: 60 MMHG | HEART RATE: 78 BPM | SYSTOLIC BLOOD PRESSURE: 100 MMHG | WEIGHT: 133.13 LBS

## 2023-06-06 DIAGNOSIS — M16.10 HIP ARTHRITIS: Primary | ICD-10-CM

## 2023-06-06 PROBLEM — H25.13 AGE-RELATED NUCLEAR CATARACT OF BOTH EYES: Status: ACTIVE | Noted: 2023-06-06

## 2023-06-06 PROBLEM — H52.4 PRESBYOPIA: Status: ACTIVE | Noted: 2023-06-06

## 2023-06-06 NOTE — PROGRESS NOTES
Follow Up Office Visit      Date: 2023   Patient Name: Jud Garnica  : 1960   MRN: [id number]     Chief Complaint:    Chief Complaint   Patient presents with    Follow-up     6 week hip pain        History of Present Illness: Jud Garnica is a 62 y.o. female who is here today to follow up with right hip pain.    HPI  Right hip pain  Patient saw Savita Amezquita on 2023 at which time x-ray was performed and showed no evidence of acute fracture, moderate to severe symmetric hip arthrosis changes, mild symmetric degenerative changes of the SI joints.  She was referred to physical therapy.    She was seen by Dr. Dariusz Tom who performed a greater trochanteric bursa injection on 2023.  She had good results with the injection.    She complains of mild soreness on her gluteus. Her pain has improved after the injection into her right hip. She denies the use of any pain medication. She is doing physical therapy 2 times a week and it is going well. She does her physical therapy exercises at home as well.    Subjective      Review of Systems:   Review of Systems    I have reviewed the patients family history, social history, past medical history, past surgical history and have updated it as appropriate.     Medications:     Current Outpatient Medications:     albuterol sulfate  (90 Base) MCG/ACT inhaler, Inhale 2 puffs Every 4 (Four) Hours As Needed for Wheezing., Disp: 1 inhaler, Rfl: 0    esomeprazole (nexIUM) 40 MG capsule, Take 1 capsule by mouth Daily., Disp: 30 capsule, Rfl: 5    ipratropium-albuterol (DUO-NEB) 0.5-2.5 mg/3 ml nebulizer, Take 3 mL by nebulization 4 (Four) Times a Day., Disp: 360 mL, Rfl: 0    Multiple Vitamins-Minerals (MULTIVITAMINS/MINERALS ADULT PO), Take  by mouth., Disp: , Rfl:     Allergies:   Allergies   Allergen Reactions    Codeine Irritability    Nyquil Multi-Symptom [Pseudoeph-Doxylamine-Dm-Apap] Irritability    Meclizine Other (See Comments) and  "GI Intolerance     \"Not sure, but knows it doesn't make her feel good\"       Objective     Physical Exam: Please see above  Vital Signs:   Vitals:    06/06/23 0808   BP: 100/60   BP Location: Left arm   Patient Position: Sitting   Cuff Size: Adult   Pulse: 78   Resp: 20   Temp: 97.5 °F (36.4 °C)   TempSrc: Temporal   Weight: 60.4 kg (133 lb 2 oz)   PainSc: 0-No pain     Body mass index is 20.85 kg/m².  BMI is within normal parameters. No other follow-up for BMI required.       Physical Exam  Vitals reviewed.   Constitutional:       General: She is not in acute distress.     Appearance: Normal appearance. She is normal weight. She is not ill-appearing or toxic-appearing.   HENT:      Head: Normocephalic and atraumatic.      Right Ear: External ear normal.      Left Ear: External ear normal.      Nose: Nose normal. No congestion.      Mouth/Throat:      Mouth: Mucous membranes are moist.      Pharynx: No oropharyngeal exudate or posterior oropharyngeal erythema.   Eyes:      General: No scleral icterus.     Extraocular Movements: Extraocular movements intact.      Pupils: Pupils are equal, round, and reactive to light.   Cardiovascular:      Rate and Rhythm: Normal rate and regular rhythm.      Pulses: Normal pulses.      Heart sounds: Normal heart sounds.   Pulmonary:      Effort: Pulmonary effort is normal. No respiratory distress.      Breath sounds: Normal breath sounds. No wheezing or rales.   Abdominal:      General: Abdomen is flat. There is no distension.   Musculoskeletal:         General: Tenderness (mild ttp over right greater trochanter and right SI joint) present. No swelling. Normal range of motion.      Cervical back: Normal range of motion. No rigidity.      Comments: No spinal tenderness, negative straight leg raise b/l   Skin:     General: Skin is warm and dry.      Capillary Refill: Capillary refill takes less than 2 seconds.      Findings: No erythema or rash.   Neurological:      General: No focal " deficit present.      Mental Status: She is alert and oriented to person, place, and time.   Psychiatric:         Mood and Affect: Mood normal.         Behavior: Behavior normal.         Judgment: Judgment normal.       Procedures    Results:   Labs:   TSH   Date Value Ref Range Status   07/15/2021 1.080 0.270 - 4.200 uIU/mL Final        Imaging:   No valid procedures specified.     Assessment / Plan      Assessment/Plan:   Problem List Items Addressed This Visit    None  Visit Diagnoses       Hip arthritis    -  Primary    She will continue physical therapy.        We discussed use of OTC analgesics such as tylenol for episodes of increased pain.       Follow Up:   Return in about 7 weeks (around 7/25/2023) for Annual with Edward.        Dov Leon MD   [facility]  Transcribed from ambient dictation for Dov Leon MD by Alma Rosa Mirza.  06/06/23   08:27 EDT    Patient or patient representative verbalized consent to the visit recording.  I have personally performed the services described in this document as transcribed by the above individual, and it is both accurate and complete.

## 2023-08-01 ENCOUNTER — OFFICE VISIT (OUTPATIENT)
Dept: INTERNAL MEDICINE | Facility: CLINIC | Age: 63
End: 2023-08-01
Payer: COMMERCIAL

## 2023-08-01 ENCOUNTER — HOSPITAL ENCOUNTER (OUTPATIENT)
Dept: GENERAL RADIOLOGY | Facility: HOSPITAL | Age: 63
Discharge: HOME | End: 2023-08-01
Admitting: STUDENT IN AN ORGANIZED HEALTH CARE EDUCATION/TRAINING PROGRAM
Payer: COMMERCIAL

## 2023-08-01 VITALS
HEIGHT: 67 IN | SYSTOLIC BLOOD PRESSURE: 110 MMHG | RESPIRATION RATE: 20 BRPM | TEMPERATURE: 98 F | BODY MASS INDEX: 19.62 KG/M2 | WEIGHT: 125 LBS | DIASTOLIC BLOOD PRESSURE: 70 MMHG | HEART RATE: 72 BPM

## 2023-08-01 DIAGNOSIS — Z13.6 SCREENING FOR ISCHEMIC HEART DISEASE: ICD-10-CM

## 2023-08-01 DIAGNOSIS — J18.9 COMMUNITY ACQUIRED PNEUMONIA OF RIGHT MIDDLE LOBE OF LUNG: ICD-10-CM

## 2023-08-01 DIAGNOSIS — Z23 ENCOUNTER FOR VACCINATION: ICD-10-CM

## 2023-08-01 DIAGNOSIS — R05.8 PRODUCTIVE COUGH: ICD-10-CM

## 2023-08-01 DIAGNOSIS — Z00.00 ROUTINE HEALTH MAINTENANCE: Primary | ICD-10-CM

## 2023-08-01 DIAGNOSIS — J45.901 ASTHMA EXACERBATION, MILD: ICD-10-CM

## 2023-08-01 LAB
ALBUMIN SERPL-MCNC: 3.6 G/DL (ref 3.5–5.2)
ALBUMIN/GLOB SERPL: 1 G/DL
ALP SERPL-CCNC: 133 U/L (ref 39–117)
ALT SERPL W P-5'-P-CCNC: 21 U/L (ref 1–33)
ANION GAP SERPL CALCULATED.3IONS-SCNC: 12.9 MMOL/L (ref 5–15)
AST SERPL-CCNC: 23 U/L (ref 1–32)
BASOPHILS # BLD AUTO: 0.04 10*3/MM3 (ref 0–0.2)
BASOPHILS NFR BLD AUTO: 0.4 % (ref 0–1.5)
BILIRUB SERPL-MCNC: 0.7 MG/DL (ref 0–1.2)
BUN SERPL-MCNC: 12 MG/DL (ref 8–23)
BUN/CREAT SERPL: 16.7 (ref 7–25)
CALCIUM SPEC-SCNC: 9.5 MG/DL (ref 8.6–10.5)
CHLORIDE SERPL-SCNC: 103 MMOL/L (ref 98–107)
CHOLEST SERPL-MCNC: 112 MG/DL (ref 0–200)
CO2 SERPL-SCNC: 24.1 MMOL/L (ref 22–29)
CREAT SERPL-MCNC: 0.72 MG/DL (ref 0.57–1)
DEPRECATED RDW RBC AUTO: 38.7 FL (ref 37–54)
EGFRCR SERPLBLD CKD-EPI 2021: 94.1 ML/MIN/1.73
EOSINOPHIL # BLD AUTO: 0.04 10*3/MM3 (ref 0–0.4)
EOSINOPHIL NFR BLD AUTO: 0.4 % (ref 0.3–6.2)
ERYTHROCYTE [DISTWIDTH] IN BLOOD BY AUTOMATED COUNT: 12.4 % (ref 12.3–15.4)
GLOBULIN UR ELPH-MCNC: 3.6 GM/DL
GLUCOSE SERPL-MCNC: 83 MG/DL (ref 65–99)
HCT VFR BLD AUTO: 34.2 % (ref 34–46.6)
HDLC SERPL-MCNC: 59 MG/DL (ref 40–60)
HGB BLD-MCNC: 11.4 G/DL (ref 12–15.9)
IMM GRANULOCYTES # BLD AUTO: 0.08 10*3/MM3 (ref 0–0.05)
IMM GRANULOCYTES NFR BLD AUTO: 0.8 % (ref 0–0.5)
LDLC SERPL CALC-MCNC: 39 MG/DL (ref 0–100)
LDLC/HDLC SERPL: 0.69 {RATIO}
LYMPHOCYTES # BLD AUTO: 1.07 10*3/MM3 (ref 0.7–3.1)
LYMPHOCYTES NFR BLD AUTO: 11.1 % (ref 19.6–45.3)
MCH RBC QN AUTO: 28.6 PG (ref 26.6–33)
MCHC RBC AUTO-ENTMCNC: 33.3 G/DL (ref 31.5–35.7)
MCV RBC AUTO: 85.9 FL (ref 79–97)
MONOCYTES # BLD AUTO: 1.08 10*3/MM3 (ref 0.1–0.9)
MONOCYTES NFR BLD AUTO: 11.2 % (ref 5–12)
NEUTROPHILS NFR BLD AUTO: 7.36 10*3/MM3 (ref 1.7–7)
NEUTROPHILS NFR BLD AUTO: 76.1 % (ref 42.7–76)
NRBC BLD AUTO-RTO: 0.1 /100 WBC (ref 0–0.2)
PLATELET # BLD AUTO: 287 10*3/MM3 (ref 140–450)
PMV BLD AUTO: 9.4 FL (ref 6–12)
POTASSIUM SERPL-SCNC: 3.8 MMOL/L (ref 3.5–5.2)
PROT SERPL-MCNC: 7.2 G/DL (ref 6–8.5)
RBC # BLD AUTO: 3.98 10*6/MM3 (ref 3.77–5.28)
SODIUM SERPL-SCNC: 140 MMOL/L (ref 136–145)
TRIGL SERPL-MCNC: 62 MG/DL (ref 0–150)
VLDLC SERPL-MCNC: 14 MG/DL (ref 5–40)
WBC NRBC COR # BLD: 9.67 10*3/MM3 (ref 3.4–10.8)

## 2023-08-01 PROCEDURE — 80053 COMPREHEN METABOLIC PANEL: CPT | Performed by: STUDENT IN AN ORGANIZED HEALTH CARE EDUCATION/TRAINING PROGRAM

## 2023-08-01 PROCEDURE — 80061 LIPID PANEL: CPT | Performed by: STUDENT IN AN ORGANIZED HEALTH CARE EDUCATION/TRAINING PROGRAM

## 2023-08-01 PROCEDURE — 85025 COMPLETE CBC W/AUTO DIFF WBC: CPT | Performed by: STUDENT IN AN ORGANIZED HEALTH CARE EDUCATION/TRAINING PROGRAM

## 2023-08-01 PROCEDURE — 71046 X-RAY EXAM CHEST 2 VIEWS: CPT

## 2023-08-01 RX ORDER — ALBUTEROL SULFATE 90 UG/1
2 AEROSOL, METERED RESPIRATORY (INHALATION) EVERY 4 HOURS PRN
Qty: 18 G | Refills: 5 | Status: SHIPPED | OUTPATIENT
Start: 2023-08-01

## 2023-08-01 RX ORDER — ZOSTER VACCINE RECOMBINANT, ADJUVANTED 50 MCG/0.5
0.5 KIT INTRAMUSCULAR ONCE
Qty: 1 EACH | Refills: 1 | Status: CANCELLED | OUTPATIENT
Start: 2023-08-01 | End: 2023-08-01

## 2023-08-01 RX ORDER — AZITHROMYCIN 250 MG/1
TABLET, FILM COATED ORAL
Qty: 6 TABLET | Refills: 0 | Status: SHIPPED | OUTPATIENT
Start: 2023-08-01

## 2023-08-01 RX ORDER — AMOXICILLIN 500 MG/1
1000 CAPSULE ORAL 3 TIMES DAILY
Qty: 42 CAPSULE | Refills: 0 | Status: SHIPPED | OUTPATIENT
Start: 2023-08-01 | End: 2023-08-08

## 2023-08-11 DIAGNOSIS — K21.00 GASTROESOPHAGEAL REFLUX DISEASE WITH ESOPHAGITIS WITHOUT HEMORRHAGE: ICD-10-CM

## 2023-08-11 RX ORDER — ESOMEPRAZOLE MAGNESIUM 40 MG/1
40 CAPSULE, DELAYED RELEASE ORAL DAILY
Qty: 30 CAPSULE | Refills: 0 | Status: SHIPPED | OUTPATIENT
Start: 2023-08-11

## 2023-08-11 NOTE — TELEPHONE ENCOUNTER
Rx Refill Note  Requested Prescriptions     Pending Prescriptions Disp Refills    esomeprazole (nexIUM) 40 MG capsule [Pharmacy Med Name: Esomeprazole Magnesium 40 MG Oral Capsule Delayed Release] 30 capsule 0     Sig: Take 1 capsule by mouth once daily      Last office visit with prescribing clinician: Visit date not found   Last telemedicine visit with prescribing clinician: Visit date not found   Next office visit with prescribing clinician: Visit date not found                             CÉSAR Carpio  08/11/23, 11:25 EDT

## 2023-09-12 ENCOUNTER — OFFICE VISIT (OUTPATIENT)
Dept: INTERNAL MEDICINE | Facility: CLINIC | Age: 63
End: 2023-09-12
Payer: COMMERCIAL

## 2023-09-12 VITALS
TEMPERATURE: 98.7 F | SYSTOLIC BLOOD PRESSURE: 100 MMHG | RESPIRATION RATE: 20 BRPM | BODY MASS INDEX: 20.25 KG/M2 | WEIGHT: 127.5 LBS | DIASTOLIC BLOOD PRESSURE: 60 MMHG | HEART RATE: 66 BPM

## 2023-09-12 DIAGNOSIS — K21.00 GASTROESOPHAGEAL REFLUX DISEASE WITH ESOPHAGITIS WITHOUT HEMORRHAGE: ICD-10-CM

## 2023-09-12 DIAGNOSIS — G25.81 RESTLESS LEG SYNDROME: Primary | ICD-10-CM

## 2023-09-12 DIAGNOSIS — D64.9 ANEMIA, UNSPECIFIED TYPE: ICD-10-CM

## 2023-09-12 DIAGNOSIS — R41.3 MEMORY DIFFICULTIES: ICD-10-CM

## 2023-09-12 LAB
BASOPHILS # BLD AUTO: 0.04 10*3/MM3 (ref 0–0.2)
BASOPHILS NFR BLD AUTO: 0.7 % (ref 0–1.5)
DEPRECATED RDW RBC AUTO: 40.7 FL (ref 37–54)
EOSINOPHIL # BLD AUTO: 0.09 10*3/MM3 (ref 0–0.4)
EOSINOPHIL NFR BLD AUTO: 1.6 % (ref 0.3–6.2)
ERYTHROCYTE [DISTWIDTH] IN BLOOD BY AUTOMATED COUNT: 13.1 % (ref 12.3–15.4)
FERRITIN SERPL-MCNC: 148 NG/ML (ref 13–150)
HCT VFR BLD AUTO: 39.3 % (ref 34–46.6)
HGB BLD-MCNC: 12.9 G/DL (ref 12–15.9)
IMM GRANULOCYTES # BLD AUTO: 0.01 10*3/MM3 (ref 0–0.05)
IMM GRANULOCYTES NFR BLD AUTO: 0.2 % (ref 0–0.5)
IRON 24H UR-MRATE: 88 MCG/DL (ref 37–145)
IRON SATN MFR SERPL: 23 % (ref 20–50)
LYMPHOCYTES # BLD AUTO: 1.54 10*3/MM3 (ref 0.7–3.1)
LYMPHOCYTES NFR BLD AUTO: 28 % (ref 19.6–45.3)
MCH RBC QN AUTO: 28.4 PG (ref 26.6–33)
MCHC RBC AUTO-ENTMCNC: 32.8 G/DL (ref 31.5–35.7)
MCV RBC AUTO: 86.4 FL (ref 79–97)
MONOCYTES # BLD AUTO: 0.42 10*3/MM3 (ref 0.1–0.9)
MONOCYTES NFR BLD AUTO: 7.6 % (ref 5–12)
NEUTROPHILS NFR BLD AUTO: 3.4 10*3/MM3 (ref 1.7–7)
NEUTROPHILS NFR BLD AUTO: 61.9 % (ref 42.7–76)
NRBC BLD AUTO-RTO: 0 /100 WBC (ref 0–0.2)
PLATELET # BLD AUTO: 248 10*3/MM3 (ref 140–450)
PMV BLD AUTO: 9.7 FL (ref 6–12)
RBC # BLD AUTO: 4.55 10*6/MM3 (ref 3.77–5.28)
TIBC SERPL-MCNC: 381 MCG/DL (ref 298–536)
TRANSFERRIN SERPL-MCNC: 256 MG/DL (ref 200–360)
WBC NRBC COR # BLD: 5.5 10*3/MM3 (ref 3.4–10.8)

## 2023-09-12 PROCEDURE — 82728 ASSAY OF FERRITIN: CPT | Performed by: STUDENT IN AN ORGANIZED HEALTH CARE EDUCATION/TRAINING PROGRAM

## 2023-09-12 PROCEDURE — 84466 ASSAY OF TRANSFERRIN: CPT | Performed by: STUDENT IN AN ORGANIZED HEALTH CARE EDUCATION/TRAINING PROGRAM

## 2023-09-12 PROCEDURE — 83540 ASSAY OF IRON: CPT | Performed by: STUDENT IN AN ORGANIZED HEALTH CARE EDUCATION/TRAINING PROGRAM

## 2023-09-12 PROCEDURE — 85025 COMPLETE CBC W/AUTO DIFF WBC: CPT | Performed by: STUDENT IN AN ORGANIZED HEALTH CARE EDUCATION/TRAINING PROGRAM

## 2023-09-12 RX ORDER — ESOMEPRAZOLE MAGNESIUM 40 MG/1
40 CAPSULE, DELAYED RELEASE ORAL DAILY
Qty: 30 CAPSULE | Refills: 0 | Status: SHIPPED | OUTPATIENT
Start: 2023-09-12

## 2023-09-12 RX ORDER — ROPINIROLE 0.25 MG/1
0.25 TABLET, FILM COATED ORAL NIGHTLY
Qty: 90 TABLET | Refills: 1 | Status: SHIPPED | OUTPATIENT
Start: 2023-09-12

## 2023-09-12 NOTE — ASSESSMENT & PLAN NOTE
This is chronic, this is worsening.  Patient meets diagnostic criteria as she has urge to move the legs accompanied by uncomfortable sensation, urge to move the legs worse during strict sitting or lying down, urged to move the legs/uncomfortable sensation relieved by movement, symptoms worse during the evening or night, and no other medical problems contributing to this.  Symptoms have been present for over 1 year.  We will obtain basic work-up to rule out iron deficiency and start her on empiric ropinirole nightly.  Discussed risk benefits and side effects of medication.  Informational handout provided in AVS.

## 2023-09-12 NOTE — TELEPHONE ENCOUNTER
Rx Refill Note  Pending Prescriptions:                       Disp   Refills    esomeprazole (nexIUM) 40 MG capsule [Pharm*30 cap*0        Sig: Take 1 capsule by mouth once daily    Last office visit with prescribing clinician: Visit date not found   Last telemedicine visit with prescribing clinician: Visit date not found   Next office visit with prescribing clinician: Visit date not found         Natasha Negron MA  09/12/23, 09:43 EDT

## 2023-09-12 NOTE — PROGRESS NOTES
Follow Up Office Visit      Date: 2023   Patient Name: Jud Garnica  : 1960   MRN: [id number]     Chief Complaint:    Chief Complaint   Patient presents with    Follow-up     Restless leg and memory        History of Present Illness: Jud Garnica is a 62 y.o. female who is here today to follow up with memory difficulties and restless legs.    HPI  Restless leg  She has issues with restless leg when trying to sit down and relax throughout the day.  She describes pins-and-needles sensation when laying down at night and the need to move her legs around, stand up or cross her legs.  She has experienced restless leg over the last several years.  She has never taken medication for this.  This is worsening.  This is affecting her sleep.  She took iron injections when she was younger for iron deficiency.      Memory difficulties  She denies any difficulty remembering names, places or getting lost.  She states that her  has brought concerns over short-term memory, but she feels that there is no issue here.  She does not have any difficulty performing her activities of daily living she is not interested in any further testing at this time.    Subjective      Review of Systems:   Review of Systems    I have reviewed the patients family history, social history, past medical history, past surgical history and have updated it as appropriate.     Medications:     Current Outpatient Medications:     albuterol sulfate  (90 Base) MCG/ACT inhaler, Inhale 2 puffs Every 4 (Four) Hours As Needed for Wheezing., Disp: 18 g, Rfl: 5    esomeprazole (nexIUM) 40 MG capsule, Take 1 capsule by mouth once daily, Disp: 30 capsule, Rfl: 0    ipratropium-albuterol (DUO-NEB) 0.5-2.5 mg/3 ml nebulizer, Take 3 mL by nebulization 4 (Four) Times a Day., Disp: 360 mL, Rfl: 0    Multiple Vitamins-Minerals (MULTIVITAMINS/MINERALS ADULT PO), Take  by mouth., Disp: , Rfl:     rOPINIRole (REQUIP) 0.25 MG tablet, Take  "1 tablet by mouth Every Night. Take 1 hour before bedtime., Disp: 90 tablet, Rfl: 1    Allergies:   Allergies   Allergen Reactions    Codeine Irritability    Nyquil Multi-Symptom [Pseudoeph-Doxylamine-Dm-Apap] Irritability    Meclizine Other (See Comments) and GI Intolerance     \"Not sure, but knows it doesn't make her feel good\"       Objective     Physical Exam: Please see above  Vital Signs:   Vitals:    09/12/23 0822   BP: 100/60   BP Location: Left arm   Patient Position: Sitting   Cuff Size: Adult   Pulse: 66   Resp: 20   Temp: 98.7 °F (37.1 °C)   TempSrc: Temporal   Weight: 57.8 kg (127 lb 8 oz)   PainSc: 0-No pain     Body mass index is 20.25 kg/m².  BMI is within normal parameters. No other follow-up for BMI required.       Physical Exam  Vitals reviewed.   Constitutional:       General: She is not in acute distress.     Appearance: Normal appearance. She is normal weight. She is not ill-appearing or toxic-appearing.   HENT:      Head: Normocephalic and atraumatic.      Right Ear: External ear normal.      Left Ear: External ear normal.      Nose: Nose normal.      Mouth/Throat:      Mouth: Mucous membranes are moist.   Eyes:      General: No scleral icterus.     Extraocular Movements: Extraocular movements intact.      Pupils: Pupils are equal, round, and reactive to light.   Cardiovascular:      Rate and Rhythm: Normal rate and regular rhythm.      Pulses: Normal pulses.      Heart sounds: Normal heart sounds. No murmur heard.    No friction rub. No gallop.   Pulmonary:      Effort: Pulmonary effort is normal. No respiratory distress.      Breath sounds: No wheezing, rhonchi or rales.   Abdominal:      General: Abdomen is flat. There is no distension.      Tenderness: There is no abdominal tenderness.   Musculoskeletal:         General: No swelling or tenderness. Normal range of motion.      Cervical back: Normal range of motion. No rigidity.      Comments: 2+ post tib pulses b/l   Skin:     General: Skin " is warm and dry.      Capillary Refill: Capillary refill takes less than 2 seconds.      Findings: No erythema or rash.   Neurological:      General: No focal deficit present.      Mental Status: She is alert and oriented to person, place, and time. Mental status is at baseline.   Psychiatric:         Mood and Affect: Mood normal.         Behavior: Behavior normal.         Judgment: Judgment normal.       Procedures    Results:   Labs:   TSH   Date Value Ref Range Status   07/15/2021 1.080 0.270 - 4.200 uIU/mL Final        Imaging:   No valid procedures specified.     Assessment / Plan      Assessment/Plan:   Problem List Items Addressed This Visit          Neuro    Restless leg syndrome - Primary    Current Assessment & Plan     This is chronic, this is worsening.  Patient meets diagnostic criteria as she has urge to move the legs accompanied by uncomfortable sensation, urge to move the legs worse during strict sitting or lying down, urged to move the legs/uncomfortable sensation relieved by movement, symptoms worse during the evening or night, and no other medical problems contributing to this.  Symptoms have been present for over 1 year.  We will obtain basic work-up to rule out iron deficiency and start her on empiric ropinirole nightly.  Discussed risk benefits and side effects of medication.  Informational handout provided in AVS.         Relevant Medications    rOPINIRole (REQUIP) 0.25 MG tablet     Other Visit Diagnoses       Anemia, unspecified type        Relevant Orders    CBC w AUTO Differential    Ferritin    Iron and TIBC    Memory difficulties        Patient denies any memory difficulties.  No further testing indicated.              Follow Up:   Return in about 3 months (around 12/12/2023) for Recheck restless legs.        Dov Leon MD   [facility]  Transcribed from ambient dictation for Dov Leon MD by Alma Rosa Mirza.  09/12/23   09:08 EDT    Patient or patient representative  verbalized consent to the visit recording.  I have personally performed the services described in this document as transcribed by the above individual, and it is both accurate and complete.

## 2023-09-13 NOTE — PROGRESS NOTES
Your results are either normal or show only some minor abnormalities which are stable, consistent with your current clinical condition and are not felt to be concerning.  Please continue your care as discussed at your last visit.      Dr. Leon

## 2023-10-16 DIAGNOSIS — K21.00 GASTROESOPHAGEAL REFLUX DISEASE WITH ESOPHAGITIS WITHOUT HEMORRHAGE: ICD-10-CM

## 2023-10-16 RX ORDER — ESOMEPRAZOLE MAGNESIUM 40 MG/1
40 CAPSULE, DELAYED RELEASE ORAL DAILY
Qty: 30 CAPSULE | Refills: 0 | Status: SHIPPED | OUTPATIENT
Start: 2023-10-16

## 2023-10-16 NOTE — TELEPHONE ENCOUNTER
Rx Refill Note  Requested Prescriptions     Pending Prescriptions Disp Refills    esomeprazole (nexIUM) 40 MG capsule [Pharmacy Med Name: Esomeprazole Magnesium 40 MG Oral Capsule Delayed Release] 30 capsule 0     Sig: Take 1 capsule by mouth once daily      Last office visit with prescribing clinician: Visit date not found   Last telemedicine visit with prescribing clinician: Visit date not found   Next office visit with prescribing clinician: Visit date not found                             CÉSAR Carpio  10/16/23, 08:02 EDT

## 2023-11-12 DIAGNOSIS — K21.00 GASTROESOPHAGEAL REFLUX DISEASE WITH ESOPHAGITIS WITHOUT HEMORRHAGE: ICD-10-CM

## 2023-11-13 RX ORDER — ESOMEPRAZOLE MAGNESIUM 40 MG/1
40 CAPSULE, DELAYED RELEASE ORAL DAILY
Qty: 30 CAPSULE | Refills: 0 | Status: SHIPPED | OUTPATIENT
Start: 2023-11-13

## 2023-11-13 NOTE — TELEPHONE ENCOUNTER
Rx Refill Note  Requested Prescriptions     Pending Prescriptions Disp Refills    esomeprazole (nexIUM) 40 MG capsule [Pharmacy Med Name: Esomeprazole Magnesium 40 MG Oral Capsule Delayed Release] 30 capsule 0     Sig: Take 1 capsule by mouth once daily      Last office visit with prescribing clinician: Visit date not found   Last telemedicine visit with prescribing clinician: Visit date not found   Next office visit with prescribing clinician: Visit date not found                             CÉSAR Carpio  11/13/23, 08:14 EST

## 2023-12-08 DIAGNOSIS — K21.00 GASTROESOPHAGEAL REFLUX DISEASE WITH ESOPHAGITIS WITHOUT HEMORRHAGE: ICD-10-CM

## 2023-12-08 RX ORDER — ESOMEPRAZOLE MAGNESIUM 40 MG/1
40 CAPSULE, DELAYED RELEASE ORAL DAILY
Qty: 30 CAPSULE | Refills: 0 | Status: SHIPPED | OUTPATIENT
Start: 2023-12-08

## 2023-12-08 NOTE — TELEPHONE ENCOUNTER
Rx Refill Note  Pending Prescriptions:                       Disp   Refills    esomeprazole (nexIUM) 40 MG capsule [Pharm*30 cap*0        Sig: Take 1 capsule by mouth once daily    Last office visit with prescribing clinician: Visit date not found   Last telemedicine visit with prescribing clinician: Visit date not found   Next office visit with prescribing clinician: Visit date not found         Natasha Negron MA  12/08/23, 08:05 EST

## 2023-12-13 ENCOUNTER — OFFICE VISIT (OUTPATIENT)
Dept: INTERNAL MEDICINE | Facility: CLINIC | Age: 63
End: 2023-12-13
Payer: COMMERCIAL

## 2023-12-13 VITALS
WEIGHT: 132.5 LBS | HEART RATE: 81 BPM | OXYGEN SATURATION: 97 % | DIASTOLIC BLOOD PRESSURE: 70 MMHG | TEMPERATURE: 97.8 F | SYSTOLIC BLOOD PRESSURE: 118 MMHG | BODY MASS INDEX: 21.04 KG/M2 | RESPIRATION RATE: 20 BRPM

## 2023-12-13 DIAGNOSIS — G25.81 RESTLESS LEG SYNDROME: Primary | ICD-10-CM

## 2023-12-13 DIAGNOSIS — M16.10 HIP ARTHRITIS: ICD-10-CM

## 2023-12-13 DIAGNOSIS — Z23 ENCOUNTER FOR VACCINATION: ICD-10-CM

## 2023-12-13 RX ORDER — ROPINIROLE 0.5 MG/1
0.5 TABLET, FILM COATED ORAL NIGHTLY
Qty: 90 TABLET | Refills: 1 | Status: SHIPPED | OUTPATIENT
Start: 2023-12-13

## 2023-12-13 NOTE — PROGRESS NOTES
Follow Up Office Visit      Date: 2023   Patient Name: Jud Garnica  : 1960   MRN: [id number]     Chief Complaint:    Chief Complaint   Patient presents with    Follow-up     3 month restless leg        History of Present Illness: Jud Garnica is a 63 y.o. female who is here today to follow up with restless legs.    HPI  Last visit started requip 0.25mg prior to bed (1-3 hours).     Restless leg syndrome  She was started on Requip at her last visit for her restless legs, which is working well for her. She is taking 1 tablet nightly. She is getting better sleep. She still has the urge to move occasionally. It depends on what she does during the day. She still has trouble going to sleep at 1:00 AM, 2:00 AM, or 3:00 AM. She is not tired during the day. She denies any side effects from the Requip.    Arthritis  She has arthritis in bilateral hips. She is having pain again. It hurts more when it is rainy outside. Her left hip is bothering her more now. She thinks it is affecting her knees also. She saw Dr. Tom in 2023 and received an injection in her hip. It bothers her when she is sitting for 30 to 40 minutes. She complains of shooting pain and numbness at night. She can hardly lay on her left side. She thinks her left side is shorter than her right side.     Immunizations  She is due for an influenza vaccine.   Subjective      Review of Systems:   Review of Systems    I have reviewed the patients family history, social history, past medical history, past surgical history and have updated it as appropriate.     Medications:     Current Outpatient Medications:     albuterol sulfate  (90 Base) MCG/ACT inhaler, Inhale 2 puffs Every 4 (Four) Hours As Needed for Wheezing., Disp: 18 g, Rfl: 5    esomeprazole (nexIUM) 40 MG capsule, Take 1 capsule by mouth once daily, Disp: 30 capsule, Rfl: 0    ipratropium-albuterol (DUO-NEB) 0.5-2.5 mg/3 ml nebulizer, Take 3 mL by nebulization 4  "(Four) Times a Day., Disp: 360 mL, Rfl: 0    Multiple Vitamins-Minerals (MULTIVITAMINS/MINERALS ADULT PO), Take  by mouth., Disp: , Rfl:     rOPINIRole (REQUIP) 0.25 MG tablet, Take 1 tablet by mouth Every Night. Take 1 hour before bedtime., Disp: 90 tablet, Rfl: 1    Allergies:   Allergies   Allergen Reactions    Codeine Irritability    Nyquil Multi-Symptom [Pseudoeph-Doxylamine-Dm-Apap] Irritability    Meclizine Other (See Comments) and GI Intolerance     \"Not sure, but knows it doesn't make her feel good\"       Objective     Physical Exam: Please see above  Vital Signs:   Vitals:    12/13/23 0818   BP: 118/70   BP Location: Left arm   Patient Position: Sitting   Cuff Size: Adult   Pulse: 81   Resp: 20   Temp: 97.8 °F (36.6 °C)   TempSrc: Temporal   SpO2: 97%   Weight: 60.1 kg (132 lb 8 oz)   PainSc:   2   PainLoc: Leg     Body mass index is 21.04 kg/m².  BMI is within normal parameters. No other follow-up for BMI required.       Physical Exam  Vitals reviewed.   Constitutional:       General: She is not in acute distress.     Appearance: Normal appearance. She is normal weight. She is not ill-appearing or toxic-appearing.   HENT:      Head: Normocephalic and atraumatic.      Right Ear: External ear normal.      Left Ear: External ear normal.      Nose: Nose normal.      Mouth/Throat:      Mouth: Mucous membranes are moist.   Eyes:      General: No scleral icterus.     Extraocular Movements: Extraocular movements intact.      Pupils: Pupils are equal, round, and reactive to light.   Cardiovascular:      Rate and Rhythm: Normal rate and regular rhythm.      Pulses: Normal pulses.      Heart sounds: Normal heart sounds. No murmur heard.     No friction rub. No gallop.   Pulmonary:      Effort: Pulmonary effort is normal. No respiratory distress.      Breath sounds: No wheezing, rhonchi or rales.   Abdominal:      General: Abdomen is flat. There is no distension.   Musculoskeletal:         General: No swelling or " tenderness. Normal range of motion.      Cervical back: Normal range of motion. No rigidity.      Comments: Negative windshield wiper test b/l. Positive log roll test R>L but both involved. Negative straight leg raise b/l. No pain to palpation of trochanteric bursa b/l   Skin:     General: Skin is warm and dry.      Capillary Refill: Capillary refill takes less than 2 seconds.      Findings: No erythema or rash.   Neurological:      General: No focal deficit present.      Mental Status: She is alert and oriented to person, place, and time. Mental status is at baseline.   Psychiatric:         Mood and Affect: Mood normal.         Behavior: Behavior normal.         Judgment: Judgment normal.         Procedures    Results:   Labs:   TSH   Date Value Ref Range Status   07/15/2021 1.080 0.270 - 4.200 uIU/mL Final        Imaging:   No valid procedures specified.     Assessment / Plan      Assessment/Plan:   Problem List Items Addressed This Visit       Restless leg syndrome - Primary    Current Assessment & Plan     Chronic, improving with treatment. Still not with full relief. Will increase dose to 0.5mg nightly of requip.          Relevant Medications    rOPINIRole (REQUIP) 0.5 MG tablet    Hip arthritis    Overview     XR 4/25/23: IMPRESSION   Cortical margins are intact without evidence of acute fracture. Moderate to severe symmetric hip arthrosis changes are present, with some narrowing and sclerosis. The SI joints demonstrate mild symmetric degenerative change.   - Dr. Tom. Has had trochanteric bursa injection with relief in past         Current Assessment & Plan     Chronic, worsening. Recommend follow up with orthopedics, provided number for clinic today.          Other Visit Diagnoses       Encounter for vaccination        Relevant Orders    Fluzone (or Fluarix & Flulaval for VFC) >6mos (Completed)              Follow Up:   Return in about 8 months (around 8/13/2024) for Annual.        Dov Leon MD    [facility] [place] [facility]  Transcribed from ambient dictation for Dov Leon MD by Alma Rosa Mirza.  12/13/23   08:57 EST    Patient or patient representative verbalized consent to the visit recording.  I have personally performed the services described in this document as transcribed by the above individual, and it is both accurate and complete.

## 2023-12-13 NOTE — ASSESSMENT & PLAN NOTE
Chronic, improving with treatment. Still not with full relief. Will increase dose to 0.5mg nightly of requip.

## 2024-01-18 DIAGNOSIS — K21.00 GASTROESOPHAGEAL REFLUX DISEASE WITH ESOPHAGITIS WITHOUT HEMORRHAGE: ICD-10-CM

## 2024-01-18 RX ORDER — ESOMEPRAZOLE MAGNESIUM 40 MG/1
40 CAPSULE, DELAYED RELEASE ORAL DAILY
Qty: 30 CAPSULE | Refills: 0 | Status: SHIPPED | OUTPATIENT
Start: 2024-01-18

## 2024-02-01 DIAGNOSIS — G25.81 RESTLESS LEG SYNDROME: ICD-10-CM

## 2024-02-02 RX ORDER — ROPINIROLE 0.25 MG/1
TABLET, FILM COATED ORAL
Qty: 90 TABLET | Refills: 0 | OUTPATIENT
Start: 2024-02-02

## 2024-02-20 DIAGNOSIS — K21.00 GASTROESOPHAGEAL REFLUX DISEASE WITH ESOPHAGITIS WITHOUT HEMORRHAGE: ICD-10-CM

## 2024-02-20 RX ORDER — ESOMEPRAZOLE MAGNESIUM 40 MG/1
40 CAPSULE, DELAYED RELEASE ORAL DAILY
Qty: 30 CAPSULE | Refills: 0 | Status: SHIPPED | OUTPATIENT
Start: 2024-02-20

## 2024-02-20 NOTE — TELEPHONE ENCOUNTER
Rx Refill Note  Pending Prescriptions:                       Disp   Refills    esomeprazole (nexIUM) 40 MG capsule [Pharm*30 cap*0        Sig: Take 1 capsule by mouth once daily    Last office visit with prescribing clinician: Visit date not found   Last telemedicine visit with prescribing clinician: Visit date not found   Next office visit with prescribing clinician: Visit date not found         Natasha Negron MA  02/20/24, 07:54 EST

## 2024-03-23 DIAGNOSIS — K21.00 GASTROESOPHAGEAL REFLUX DISEASE WITH ESOPHAGITIS WITHOUT HEMORRHAGE: ICD-10-CM

## 2024-03-25 RX ORDER — ESOMEPRAZOLE MAGNESIUM 40 MG/1
40 CAPSULE, DELAYED RELEASE ORAL DAILY
Qty: 30 CAPSULE | Refills: 0 | OUTPATIENT
Start: 2024-03-25

## 2024-03-25 NOTE — TELEPHONE ENCOUNTER
Rx Refill Note  Requested Prescriptions     Pending Prescriptions Disp Refills    esomeprazole (nexIUM) 40 MG capsule [Pharmacy Med Name: Esomeprazole Magnesium 40 MG Oral Capsule Delayed Release] 30 capsule 0     Sig: Take 1 capsule by mouth once daily      Last office visit with prescribing clinician: Visit date not found   Last telemedicine visit with prescribing clinician: Visit date not found   Next office visit with prescribing clinician: Visit date not found                           CÉSAR Carpio  03/25/24, 08:05 EDT

## 2024-03-29 DIAGNOSIS — K21.00 GASTROESOPHAGEAL REFLUX DISEASE WITH ESOPHAGITIS WITHOUT HEMORRHAGE: ICD-10-CM

## 2024-03-29 RX ORDER — ESOMEPRAZOLE MAGNESIUM 40 MG/1
40 CAPSULE, DELAYED RELEASE ORAL DAILY
Qty: 30 CAPSULE | Refills: 0 | OUTPATIENT
Start: 2024-03-29

## 2024-04-02 DIAGNOSIS — K21.00 GASTROESOPHAGEAL REFLUX DISEASE WITH ESOPHAGITIS WITHOUT HEMORRHAGE: ICD-10-CM

## 2024-04-03 RX ORDER — ESOMEPRAZOLE MAGNESIUM 40 MG/1
40 CAPSULE, DELAYED RELEASE ORAL DAILY
Qty: 30 CAPSULE | Refills: 0 | OUTPATIENT
Start: 2024-04-03

## 2024-04-03 NOTE — TELEPHONE ENCOUNTER
Rx Refill Note  Requested Prescriptions     Pending Prescriptions Disp Refills    esomeprazole (nexIUM) 40 MG capsule [Pharmacy Med Name: Esomeprazole Magnesium 40 MG Oral Capsule Delayed Release] 30 capsule 0     Sig: Take 1 capsule by mouth once daily      Last office visit with prescribing clinician: Visit date not found   Last telemedicine visit with prescribing clinician: Visit date not found   Next office visit with prescribing clinician: Visit date not found                           CÉSAR Carpio  04/03/24, 08:06 EDT

## 2024-04-05 ENCOUNTER — TELEPHONE (OUTPATIENT)
Dept: INTERNAL MEDICINE | Facility: CLINIC | Age: 64
End: 2024-04-05
Payer: COMMERCIAL

## 2024-04-05 PROCEDURE — 87077 CULTURE AEROBIC IDENTIFY: CPT | Performed by: NURSE PRACTITIONER

## 2024-04-05 PROCEDURE — 87186 SC STD MICRODIL/AGAR DIL: CPT | Performed by: NURSE PRACTITIONER

## 2024-04-05 PROCEDURE — 87086 URINE CULTURE/COLONY COUNT: CPT | Performed by: NURSE PRACTITIONER

## 2024-04-05 NOTE — TELEPHONE ENCOUNTER
Caller: Jud Garnica    Relationship: Self    Best call back number: [phone]     What medication are you requesting: N/A    What are your current symptoms: BURNING AND PAINFUL URINATION, FREQUENT URINATION WITH URGENCY, VAGINAL BURNING     How long have you been experiencing symptoms: SINCE LAST NIGHT    Have you had these symptoms before:    [x] Yes  [] No    Have you been treated for these symptoms before:   [x] Yes  [] No    If a prescription is needed, what is your preferred pharmacy and phone number: [facility] [address] [place] [address] [phone] [place] [phone]      Additional notes: PATIENT TRIED TO BE SEEN BUT NO PROVIDER WAS AVAILABLE.

## 2024-04-05 NOTE — TELEPHONE ENCOUNTER
Please apologize to patient that we don't have availability. Recommend patient be seen in [facility].    Dr. Leon

## 2024-04-25 ENCOUNTER — OFFICE VISIT (OUTPATIENT)
Dept: INTERNAL MEDICINE | Facility: CLINIC | Age: 64
End: 2024-04-25
Payer: COMMERCIAL

## 2024-04-25 ENCOUNTER — TELEPHONE (OUTPATIENT)
Dept: INTERNAL MEDICINE | Facility: CLINIC | Age: 64
End: 2024-04-25

## 2024-04-25 VITALS
TEMPERATURE: 97.8 F | RESPIRATION RATE: 20 BRPM | WEIGHT: 132.25 LBS | HEART RATE: 93 BPM | BODY MASS INDEX: 21.35 KG/M2 | SYSTOLIC BLOOD PRESSURE: 110 MMHG | OXYGEN SATURATION: 98 % | DIASTOLIC BLOOD PRESSURE: 70 MMHG

## 2024-04-25 DIAGNOSIS — K21.00 GASTROESOPHAGEAL REFLUX DISEASE WITH ESOPHAGITIS WITHOUT HEMORRHAGE: ICD-10-CM

## 2024-04-25 DIAGNOSIS — R30.0 DYSURIA: ICD-10-CM

## 2024-04-25 DIAGNOSIS — J45.40 MODERATE PERSISTENT ASTHMA WITHOUT COMPLICATION: Primary | ICD-10-CM

## 2024-04-25 LAB
BILIRUB BLD-MCNC: NEGATIVE MG/DL
CLARITY, POC: CLEAR
COLOR UR: YELLOW
GLUCOSE UR STRIP-MCNC: NEGATIVE MG/DL
KETONES UR QL: NEGATIVE
LEUKOCYTE EST, POC: NEGATIVE
NITRITE UR-MCNC: NEGATIVE MG/ML
PH UR: 6.5 [PH] (ref 5–8)
PROT UR STRIP-MCNC: NEGATIVE MG/DL
RBC # UR STRIP: NEGATIVE /UL
SP GR UR: 1.01 (ref 1–1.03)
UROBILINOGEN UR QL: NORMAL

## 2024-04-25 PROCEDURE — 1159F MED LIST DOCD IN RCRD: CPT | Performed by: STUDENT IN AN ORGANIZED HEALTH CARE EDUCATION/TRAINING PROGRAM

## 2024-04-25 PROCEDURE — 99214 OFFICE O/P EST MOD 30 MIN: CPT | Performed by: STUDENT IN AN ORGANIZED HEALTH CARE EDUCATION/TRAINING PROGRAM

## 2024-04-25 PROCEDURE — 1160F RVW MEDS BY RX/DR IN RCRD: CPT | Performed by: STUDENT IN AN ORGANIZED HEALTH CARE EDUCATION/TRAINING PROGRAM

## 2024-04-25 RX ORDER — BUDESONIDE AND FORMOTEROL FUMARATE DIHYDRATE 80; 4.5 UG/1; UG/1
2 AEROSOL RESPIRATORY (INHALATION)
Qty: 10.2 G | Refills: 12 | Status: SHIPPED | OUTPATIENT
Start: 2024-04-25

## 2024-04-25 RX ORDER — ESOMEPRAZOLE MAGNESIUM 40 MG/1
40 CAPSULE, DELAYED RELEASE ORAL DAILY
Qty: 90 CAPSULE | Refills: 1 | Status: SHIPPED | OUTPATIENT
Start: 2024-04-25

## 2024-04-25 RX ORDER — PREDNISONE 20 MG/1
40 TABLET ORAL DAILY
Qty: 10 TABLET | Refills: 0 | Status: SHIPPED | OUTPATIENT
Start: 2024-04-25 | End: 2024-04-30

## 2024-04-25 NOTE — TELEPHONE ENCOUNTER
----- Message from Dov Leon MD sent at 4/25/2024  1:03 PM EDT -----  Please call the patient regarding her normal result. Urine testing perfect, no abnormalities or signs of infection.    Dr. Leon

## 2024-04-25 NOTE — TELEPHONE ENCOUNTER
Additional Information Required  The brand name, Symbicort, is available without prior authorization.Please have the pharmacy process the claim for the brand Symbicort.If there is a clinical reason the patient cannot use the brand formulation (such as allergy to an ingredient found in the brand that is not in the generic) then please fax the appropriate PA request along with the confirming documentation directly to "[employer] at [phone].

## 2024-04-25 NOTE — PROGRESS NOTES
Follow Up Office Visit      Date: 2024   Patient Name: Jud Garnica  : 1960   MRN: [id number]     Chief Complaint:    Chief Complaint   Patient presents with    Asthma       History of Present Illness: Jud Garnica is a 63 y.o. female who is here today for dyspnea, wehezing.    HPI  History of Present Illness  The patient presents for evaluation of multiple medical concerns.    The patient has been experiencing dyspnea for the past few weeks, particularly during physical activities such as sitting, walking, and rolling over in bed. She reports intermittent wheezing and intermittent sharp pain, but denies any associated upper extremity or neck pain, gastrointestinal upset, or vomiting. She describes a sensation of tightness in her chest. Her symptoms ameliorate with the use of her inhaler, although they recur frequently. She has no history of seasonal allergies. She denies systemic symptoms such as fever or chills, but occasionally experiences mild headaches, depending on her activities. Her use of albuterol is 2 to 3 times daily, which provides some relief, with the most recent use occurring yesterday. The severity of her asthma appears to fluctuate, but she has not experienced an asthma attack for an extended period. She sought emergency care at [facility] in 2024 for a cough, suspected to be pneumonia, and was treated with antibiotics and steroids. She denies peripheral edema or burning sensation.    The patient reports a malodorous urine, but denies dysuria.    The patient was previously diagnosed with heartburn following an esophageal dilation procedure. However, she did not report any symptoms at that time. She attempted to refill her Nexium prescription, but the prescription was no longer available. .   Her father  of lung cancer.    Asthma  - reports history of asthma using albuterol  - prev saw pulmonology Aditya Watson. Previously was on Qvar for maintenance   - was  "intolerant to singulair in past    Subjective      Review of Systems:   Review of Systems    I have reviewed the patients family history, social history, past medical history, past surgical history and have updated it as appropriate.     Medications:     Current Outpatient Medications:     albuterol sulfate  (90 Base) MCG/ACT inhaler, Inhale 2 puffs Every 4 (Four) Hours As Needed for Wheezing., Disp: 18 g, Rfl: 5    esomeprazole (nexIUM) 40 MG capsule, Take 1 capsule by mouth Daily., Disp: 90 capsule, Rfl: 1    ipratropium-albuterol (DUO-NEB) 0.5-2.5 mg/3 ml nebulizer, Take 3 mL by nebulization 4 (Four) Times a Day., Disp: 360 mL, Rfl: 0    Multiple Vitamins-Minerals (MULTIVITAMINS/MINERALS ADULT PO), Take  by mouth., Disp: , Rfl:     rOPINIRole (REQUIP) 0.5 MG tablet, Take 1 tablet by mouth Every Night. Take 1 hour before bedtime., Disp: 90 tablet, Rfl: 1    budesonide-formoterol (Symbicort) 80-4.5 MCG/ACT inhaler, Inhale 2 puffs 2 (Two) Times a Day., Disp: 10.2 g, Rfl: 12    predniSONE (DELTASONE) 20 MG tablet, Take 2 tablets by mouth Daily for 5 days., Disp: 10 tablet, Rfl: 0    Allergies:   Allergies   Allergen Reactions    Codeine Irritability    Nyquil Multi-Symptom [Pseudoeph-Doxylamine-Dm-Apap] Irritability    Meclizine Other (See Comments) and GI Intolerance     \"Not sure, but knows it doesn't make her feel good\"       Objective     Physical Exam: Please see above  Vital Signs:   Vitals:    04/25/24 1000   BP: 110/70   BP Location: Left arm   Patient Position: Sitting   Cuff Size: Adult   Pulse: 93   Resp: 20   Temp: 97.8 °F (36.6 °C)   TempSrc: Temporal   SpO2: 98%   Weight: 60 kg (132 lb 4 oz)   PainSc:   2   PainLoc: Chest     Body mass index is 21.35 kg/m².    Physical Exam  Vitals reviewed.   Constitutional:       General: She is not in acute distress.     Appearance: Normal appearance. She is normal weight. She is not ill-appearing or toxic-appearing.   HENT:      Head: Normocephalic and " atraumatic.      Right Ear: External ear normal.      Left Ear: External ear normal.      Nose: Nose normal. No congestion.      Mouth/Throat:      Mouth: Mucous membranes are moist.      Pharynx: No oropharyngeal exudate or posterior oropharyngeal erythema.   Eyes:      General: No scleral icterus.     Extraocular Movements: Extraocular movements intact.      Pupils: Pupils are equal, round, and reactive to light.   Cardiovascular:      Rate and Rhythm: Normal rate and regular rhythm.      Pulses: Normal pulses.      Heart sounds: Normal heart sounds.   Pulmonary:      Effort: Pulmonary effort is normal. No respiratory distress.      Breath sounds: No stridor. Wheezing (faint end expiratory wheeze throughout) present. No rhonchi or rales.   Abdominal:      General: Abdomen is flat. There is no distension.      Palpations: Abdomen is soft.   Musculoskeletal:         General: No swelling or tenderness. Normal range of motion.      Cervical back: Normal range of motion. No rigidity.   Skin:     General: Skin is warm and dry.      Capillary Refill: Capillary refill takes less than 2 seconds.   Neurological:      General: No focal deficit present.      Mental Status: She is alert and oriented to person, place, and time.   Psychiatric:         Mood and Affect: Mood normal.         Behavior: Behavior normal.         Judgment: Judgment normal.       Physical Exam  Vital Signs  The patient's oxygen saturation is 98 percent.      Procedures    Results:   Labs:   TSH   Date Value Ref Range Status   07/15/2021 1.080 0.270 - 4.200 uIU/mL Final      Results  Laboratory Studies  Cologuard test was negative.    Imaging  Personally reviewed interpretation of CXR dated 3/16/24, no masses or lesions  Chest x-ray showed questionable haziness, possibly pneumonia.    Testing  Pulmonary function tests done in 2019 showed some obstruction.      Imaging:   No valid procedures specified.     Assessment / Plan      Assessment/Plan:   Problem  List Items Addressed This Visit       Moderate persistent asthma without complication - Primary    Relevant Medications    predniSONE (DELTASONE) 20 MG tablet    budesonide-formoterol (Symbicort) 80-4.5 MCG/ACT inhaler     Other Visit Diagnoses       Dysuria        Relevant Orders    POC Urinalysis Dipstick    Gastroesophageal reflux disease with esophagitis without hemorrhage        Relevant Medications    esomeprazole (nexIUM) 40 MG capsule            Assessment & Plan  1. Asthma.  Chronic, worsening  The patient's symptoms are likely attributable to asthma. A 5-day course of steroids, 40 mg, will be prescribed to alleviate inflammation. Additionally, Symbicort will be prescribed, to be administered as 2 puffs in the morning and 2 puffs at night, to be administered prior to brushing her teeth to prevent thrush. Should there be no improvement within 1.5 to 2 weeks, the patient will inform us, at which point a chest x-ray will be considered. Counseled on red flag symptoms and indications to return to clinic.    2. Abnormal urine odor.  A urinalysis will be conducted. The patient will be notified of any abnormal findings.    3. Gastroesophageal reflux disease.  The patient's cough and wheezing may be attributed to reflux. Refills for Nexium 40 mg will be provided.    4. Colon cancer screening.  The Cologuard test yielded negative results, indicating no abnormal findings. The patient is advised to repeat the Cologuard test in 3 years.    Follow-up  The patient is scheduled for a follow-up visit in 08/2024.       Follow Up:   Return if symptoms worsen or fail to improve.        Dov Leon MD  [facility] Galan    Patient or patient representative verbalized consent for the use of Ambient Listening during the visit with  Dov Leon MD for chart documentation. 4/25/2024  10:22 EDT

## 2024-05-26 DIAGNOSIS — G25.81 RESTLESS LEG SYNDROME: ICD-10-CM

## 2024-05-28 RX ORDER — ROPINIROLE 0.5 MG/1
0.5 TABLET, FILM COATED ORAL NIGHTLY
Qty: 90 TABLET | Refills: 0 | Status: SHIPPED | OUTPATIENT
Start: 2024-05-28

## 2024-08-15 ENCOUNTER — OFFICE VISIT (OUTPATIENT)
Dept: INTERNAL MEDICINE | Facility: CLINIC | Age: 64
End: 2024-08-15
Payer: COMMERCIAL

## 2024-08-15 VITALS
DIASTOLIC BLOOD PRESSURE: 70 MMHG | RESPIRATION RATE: 16 BRPM | SYSTOLIC BLOOD PRESSURE: 108 MMHG | HEIGHT: 67 IN | TEMPERATURE: 97.3 F | WEIGHT: 130 LBS | HEART RATE: 86 BPM | BODY MASS INDEX: 20.4 KG/M2

## 2024-08-15 DIAGNOSIS — Z80.49 FAMILY HISTORY OF CERVICAL CANCER: ICD-10-CM

## 2024-08-15 DIAGNOSIS — Z00.00 ROUTINE HEALTH MAINTENANCE: Primary | ICD-10-CM

## 2024-08-15 DIAGNOSIS — G25.81 RESTLESS LEG SYNDROME: ICD-10-CM

## 2024-08-15 DIAGNOSIS — R53.83 OTHER FATIGUE: ICD-10-CM

## 2024-08-15 DIAGNOSIS — F43.21 GRIEF: ICD-10-CM

## 2024-08-15 DIAGNOSIS — Z23 ENCOUNTER FOR VACCINATION: ICD-10-CM

## 2024-08-15 DIAGNOSIS — Z12.31 BREAST CANCER SCREENING BY MAMMOGRAM: ICD-10-CM

## 2024-08-15 DIAGNOSIS — Z13.6 SCREENING FOR ISCHEMIC HEART DISEASE: ICD-10-CM

## 2024-08-15 DIAGNOSIS — J45.40 MODERATE PERSISTENT ASTHMA WITHOUT COMPLICATION: ICD-10-CM

## 2024-08-15 LAB
ALBUMIN SERPL-MCNC: 4.2 G/DL (ref 3.5–5.2)
ALBUMIN/GLOB SERPL: 1.3 G/DL
ALP SERPL-CCNC: 108 U/L (ref 39–117)
ALT SERPL W P-5'-P-CCNC: 20 U/L (ref 1–33)
ANION GAP SERPL CALCULATED.3IONS-SCNC: 10.7 MMOL/L (ref 5–15)
AST SERPL-CCNC: 30 U/L (ref 1–32)
BASOPHILS # BLD AUTO: 0.03 10*3/MM3 (ref 0–0.2)
BASOPHILS NFR BLD AUTO: 0.3 % (ref 0–1.5)
BILIRUB SERPL-MCNC: 1.2 MG/DL (ref 0–1.2)
BUN SERPL-MCNC: 12 MG/DL (ref 8–23)
BUN/CREAT SERPL: 13.6 (ref 7–25)
CALCIUM SPEC-SCNC: 10.1 MG/DL (ref 8.6–10.5)
CHLORIDE SERPL-SCNC: 106 MMOL/L (ref 98–107)
CHOLEST SERPL-MCNC: 143 MG/DL (ref 0–200)
CO2 SERPL-SCNC: 23.3 MMOL/L (ref 22–29)
CREAT SERPL-MCNC: 0.88 MG/DL (ref 0.57–1)
DEPRECATED RDW RBC AUTO: 41 FL (ref 37–54)
EGFRCR SERPLBLD CKD-EPI 2021: 73.5 ML/MIN/1.73
EOSINOPHIL # BLD AUTO: 0.06 10*3/MM3 (ref 0–0.4)
EOSINOPHIL NFR BLD AUTO: 0.6 % (ref 0.3–6.2)
ERYTHROCYTE [DISTWIDTH] IN BLOOD BY AUTOMATED COUNT: 13.2 % (ref 12.3–15.4)
FERRITIN SERPL-MCNC: 174 NG/ML (ref 13–150)
GLOBULIN UR ELPH-MCNC: 3.3 GM/DL
GLUCOSE SERPL-MCNC: 85 MG/DL (ref 65–99)
HCT VFR BLD AUTO: 40.4 % (ref 34–46.6)
HDLC SERPL-MCNC: 77 MG/DL (ref 40–60)
HGB BLD-MCNC: 12.8 G/DL (ref 12–15.9)
IMM GRANULOCYTES # BLD AUTO: 0.04 10*3/MM3 (ref 0–0.05)
IMM GRANULOCYTES NFR BLD AUTO: 0.4 % (ref 0–0.5)
IRON 24H UR-MRATE: 47 MCG/DL (ref 37–145)
IRON SATN MFR SERPL: 13 % (ref 20–50)
LDLC SERPL CALC-MCNC: 53 MG/DL (ref 0–100)
LDLC/HDLC SERPL: 0.69 {RATIO}
LYMPHOCYTES # BLD AUTO: 1.41 10*3/MM3 (ref 0.7–3.1)
LYMPHOCYTES NFR BLD AUTO: 14.5 % (ref 19.6–45.3)
MCH RBC QN AUTO: 27.3 PG (ref 26.6–33)
MCHC RBC AUTO-ENTMCNC: 31.7 G/DL (ref 31.5–35.7)
MCV RBC AUTO: 86.1 FL (ref 79–97)
MONOCYTES # BLD AUTO: 0.71 10*3/MM3 (ref 0.1–0.9)
MONOCYTES NFR BLD AUTO: 7.3 % (ref 5–12)
NEUTROPHILS NFR BLD AUTO: 7.49 10*3/MM3 (ref 1.7–7)
NEUTROPHILS NFR BLD AUTO: 76.9 % (ref 42.7–76)
NRBC BLD AUTO-RTO: 0 /100 WBC (ref 0–0.2)
PLATELET # BLD AUTO: 259 10*3/MM3 (ref 140–450)
PMV BLD AUTO: 9.1 FL (ref 6–12)
POTASSIUM SERPL-SCNC: 5.1 MMOL/L (ref 3.5–5.2)
PROT SERPL-MCNC: 7.5 G/DL (ref 6–8.5)
RBC # BLD AUTO: 4.69 10*6/MM3 (ref 3.77–5.28)
SODIUM SERPL-SCNC: 140 MMOL/L (ref 136–145)
T4 FREE SERPL-MCNC: 1.13 NG/DL (ref 0.92–1.68)
TIBC SERPL-MCNC: 359 MCG/DL (ref 298–536)
TRANSFERRIN SERPL-MCNC: 241 MG/DL (ref 200–360)
TRIGL SERPL-MCNC: 66 MG/DL (ref 0–150)
TSH SERPL DL<=0.05 MIU/L-ACNC: 1.23 UIU/ML (ref 0.27–4.2)
VIT B12 BLD-MCNC: 975 PG/ML (ref 211–946)
VLDLC SERPL-MCNC: 13 MG/DL (ref 5–40)
WBC NRBC COR # BLD AUTO: 9.74 10*3/MM3 (ref 3.4–10.8)

## 2024-08-15 PROCEDURE — 82607 VITAMIN B-12: CPT | Performed by: STUDENT IN AN ORGANIZED HEALTH CARE EDUCATION/TRAINING PROGRAM

## 2024-08-15 PROCEDURE — 80053 COMPREHEN METABOLIC PANEL: CPT | Performed by: STUDENT IN AN ORGANIZED HEALTH CARE EDUCATION/TRAINING PROGRAM

## 2024-08-15 PROCEDURE — 84439 ASSAY OF FREE THYROXINE: CPT | Performed by: STUDENT IN AN ORGANIZED HEALTH CARE EDUCATION/TRAINING PROGRAM

## 2024-08-15 PROCEDURE — 82728 ASSAY OF FERRITIN: CPT | Performed by: STUDENT IN AN ORGANIZED HEALTH CARE EDUCATION/TRAINING PROGRAM

## 2024-08-15 PROCEDURE — 84466 ASSAY OF TRANSFERRIN: CPT | Performed by: STUDENT IN AN ORGANIZED HEALTH CARE EDUCATION/TRAINING PROGRAM

## 2024-08-15 PROCEDURE — 85025 COMPLETE CBC W/AUTO DIFF WBC: CPT | Performed by: STUDENT IN AN ORGANIZED HEALTH CARE EDUCATION/TRAINING PROGRAM

## 2024-08-15 PROCEDURE — 80061 LIPID PANEL: CPT | Performed by: STUDENT IN AN ORGANIZED HEALTH CARE EDUCATION/TRAINING PROGRAM

## 2024-08-15 PROCEDURE — 84443 ASSAY THYROID STIM HORMONE: CPT | Performed by: STUDENT IN AN ORGANIZED HEALTH CARE EDUCATION/TRAINING PROGRAM

## 2024-08-15 PROCEDURE — 83540 ASSAY OF IRON: CPT | Performed by: STUDENT IN AN ORGANIZED HEALTH CARE EDUCATION/TRAINING PROGRAM

## 2024-08-15 RX ORDER — ZOSTER VACCINE RECOMBINANT, ADJUVANTED 50 MCG/0.5
0.5 KIT INTRAMUSCULAR ONCE
Qty: 1 EACH | Refills: 1 | Status: CANCELLED | OUTPATIENT
Start: 2024-08-15 | End: 2024-08-15

## 2024-08-15 NOTE — PATIENT INSTRUCTIONS
Health Maintenance, Female  Adopting a healthy lifestyle and getting preventive care can go a long way to promote health and wellness. Talk with your health care provider about what schedule of regular examinations is right for you. This is a good chance for you to check in with your provider about disease prevention and staying healthy.  In between checkups, there are plenty of things you can do on your own. Experts have done a lot of research about which lifestyle changes and preventive measures are most likely to keep you healthy. Ask your health care provider for more information.  Weight and diet  Eat a healthy diet  Be sure to include plenty of vegetables, fruits, low-fat dairy products, and lean protein.  Do not eat a lot of foods high in solid fats, added sugars, or salt.  Get regular exercise. This is one of the most important things you can do for your health.  Most adults should exercise for at least 150 minutes each week. The exercise should increase your heart rate and make you sweat (moderate-intensity exercise).  Most adults should also do strengthening exercises at least twice a week. This is in addition to the moderate-intensity exercise.     Maintain a healthy weight  Body mass index (BMI) is a measurement that can be used to identify possible weight problems. It estimates body fat based on height and weight. Your health care provider can help determine your BMI and help you achieve or maintain a healthy weight.  For females 20 years of age and older:  A BMI below 18.5 is considered underweight.  A BMI of 18.5 to 24.9 is normal.  A BMI of 25 to 29.9 is considered overweight.  A BMI of 30 and above is considered obese.     Watch levels of cholesterol and blood lipids  You should start having your blood tested for lipids and cholesterol at 20 years of age, then have this test every 5 years.  You may need to have your cholesterol levels checked more often if:  Your lipid or cholesterol levels are  high.  You are older than 50 years of age.  You are at high risk for heart disease.     Cancer screening  Lung Cancer  Lung cancer screening is recommended for adults 55-80 years old who are at high risk for lung cancer because of a history of smoking.  A yearly low-dose CT scan of the lungs is recommended for people who:  Currently smoke.  Have quit within the past 15 years.  Have at least a 30-pack-year history of smoking. A pack year is smoking an average of one pack of cigarettes a day for 1 year.  Yearly screening should continue until it has been 15 years since you quit.  Yearly screening should stop if you develop a health problem that would prevent you from having lung cancer treatment.     Breast Cancer  Practice breast self-awareness. This means understanding how your breasts normally appear and feel.  It also means doing regular breast self-exams. Let your health care provider know about any changes, no matter how small.  If you are in your 20s or 30s, you should have a clinical breast exam (CBE) by a health care provider every 1-3 years as part of a regular health exam.  If you are 40 or older, have a CBE every year. Also consider having a breast X-ray (mammogram) every year.  If you have a family history of breast cancer, talk to your health care provider about genetic screening.  If you are at high risk for breast cancer, talk to your health care provider about having an MRI and a mammogram every year.  Breast cancer gene (BRCA) assessment is recommended for women who have family members with BRCA-related cancers. BRCA-related cancers include:  Breast.  Ovarian.  Tubal.  Peritoneal cancers.  Results of the assessment will determine the need for genetic counseling and BRCA1 and BRCA2 testing.     Cervical Cancer  Your health care provider may recommend that you be screened regularly for cancer of the pelvic organs (ovaries, uterus, and vagina). This screening involves a pelvic examination, including  checking for microscopic changes to the surface of your cervix (Pap test). You may be encouraged to have this screening done every 3 years, beginning at age 21.  For women ages 30-65, health care providers may recommend pelvic exams and Pap testing every 3 years, or they may recommend the Pap and pelvic exam, combined with testing for human papilloma virus (HPV), every 5 years. Some types of HPV increase your risk of cervical cancer. Testing for HPV may also be done on women of any age with unclear Pap test results.  Other health care providers may not recommend any screening for nonpregnant women who are considered low risk for pelvic cancer and who do not have symptoms. Ask your health care provider if a screening pelvic exam is right for you.  If you have had past treatment for cervical cancer or a condition that could lead to cancer, you need Pap tests and screening for cancer for at least 20 years after your treatment. If Pap tests have been discontinued, your risk factors (such as having a new sexual partner) need to be reassessed to determine if screening should resume. Some women have medical problems that increase the chance of getting cervical cancer. In these cases, your health care provider may recommend more frequent screening and Pap tests.     Colorectal Cancer  This type of cancer can be detected and often prevented.  Routine colorectal cancer screening usually begins at 50 years of age and continues through 75 years of age.  Your health care provider may recommend screening at an earlier age if you have risk factors for colon cancer.  Your health care provider may also recommend using home test kits to check for hidden blood in the stool.  A small camera at the end of a tube can be used to examine your colon directly (sigmoidoscopy or colonoscopy). This is done to check for the earliest forms of colorectal cancer.  Routine screening usually begins at age 50.  Direct examination of the colon should  be repeated every 5-10 years through 75 years of age. However, you may need to be screened more often if early forms of precancerous polyps or small growths are found.     Skin Cancer  Check your skin from head to toe regularly.  Tell your health care provider about any new moles or changes in moles, especially if there is a change in a mole's shape or color.  Also tell your health care provider if you have a mole that is larger than the size of a pencil eraser.  Always use sunscreen. Apply sunscreen liberally and repeatedly throughout the day.  Protect yourself by wearing long sleeves, pants, a wide-brimmed hat, and sunglasses whenever you are outside.     Heart disease, diabetes, and high blood pressure  High blood pressure causes heart disease and increases the risk of stroke. High blood pressure is more likely to develop in:  People who have blood pressure in the high end of the normal range (130-139/85-89 mm Hg).  People who are overweight or obese.  People who are .  If you are 18-39 years of age, have your blood pressure checked every 3-5 years. If you are 40 years of age or older, have your blood pressure checked every year. You should have your blood pressure measured twice--once when you are at a hospital or clinic, and once when you are not at a hospital or clinic. Record the average of the two measurements. To check your blood pressure when you are not at a hospital or clinic, you can use:  An automated blood pressure machine at a pharmacy.  A home blood pressure monitor.  If you are between 55 years and 79 years old, ask your health care provider if you should take aspirin to prevent strokes.  Have regular diabetes screenings. This involves taking a blood sample to check your fasting blood sugar level.  If you are at a normal weight and have a low risk for diabetes, have this test once every three years after 45 years of age.  If you are overweight and have a high risk for diabetes,  consider being tested at a younger age or more often.  Preventing infection  Hepatitis B  If you have a higher risk for hepatitis B, you should be screened for this virus. You are considered at high risk for hepatitis B if:  You were born in a country where hepatitis B is common. Ask your health care provider which countries are considered high risk.  Your parents were born in a high-risk country, and you have not been immunized against hepatitis B (hepatitis B vaccine).  You have HIV or AIDS.  You use needles to inject street drugs.  You live with someone who has hepatitis B.  You have had sex with someone who has hepatitis B.  You get hemodialysis treatment.  You take certain medicines for conditions, including cancer, organ transplantation, and autoimmune conditions.     Hepatitis C  Blood testing is recommended for:  Everyone born from 1945 through 1965.  Anyone with known risk factors for hepatitis C.     Sexually transmitted infections (STIs)  You should be screened for sexually transmitted infections (STIs) including gonorrhea and chlamydia if:  You are sexually active and are younger than 24 years of age.  You are older than 24 years of age and your health care provider tells you that you are at risk for this type of infection.  Your sexual activity has changed since you were last screened and you are at an increased risk for chlamydia or gonorrhea. Ask your health care provider if you are at risk.  If you do not have HIV, but are at risk, it may be recommended that you take a prescription medicine daily to prevent HIV infection. This is called pre-exposure prophylaxis (PrEP). You are considered at risk if:  You are sexually active and do not regularly use condoms or know the HIV status of your partner(s).  You take drugs by injection.  You are sexually active with a partner who has HIV.     Talk with your health care provider about whether you are at high risk of being infected with HIV. If you choose to  begin PrEP, you should first be tested for HIV. You should then be tested every 3 months for as long as you are taking PrEP.  Pregnancy  If you are premenopausal and you may become pregnant, ask your health care provider about preconception counseling.  If you may become pregnant, take 400 to 800 micrograms (mcg) of folic acid every day.  If you want to prevent pregnancy, talk to your health care provider about birth control (contraception).  Osteoporosis and menopause  Osteoporosis is a disease in which the bones lose minerals and strength with aging. This can result in serious bone fractures. Your risk for osteoporosis can be identified using a bone density scan.  If you are 65 years of age or older, or if you are at risk for osteoporosis and fractures, ask your health care provider if you should be screened.  Ask your health care provider whether you should take a calcium or vitamin D supplement to lower your risk for osteoporosis.  Menopause may have certain physical symptoms and risks.  Hormone replacement therapy may reduce some of these symptoms and risks.  Talk to your health care provider about whether hormone replacement therapy is right for you.  Follow these instructions at home:  Schedule regular health, dental, and eye exams.  Stay current with your immunizations.  Do not use any tobacco products including cigarettes, chewing tobacco, or electronic cigarettes.  If you are pregnant, do not drink alcohol.  If you are breastfeeding, limit how much and how often you drink alcohol.  Limit alcohol intake to no more than 1 drink per day for nonpregnant women. One drink equals 12 ounces of beer, 5 ounces of wine, or 1½ ounces of hard liquor.  Do not use street drugs.  Do not share needles.  Ask your health care provider for help if you need support or information about quitting drugs.  Tell your health care provider if you often feel depressed.  Tell your health care provider if you have ever been abused or do  not feel safe at home.  This information is not intended to replace advice given to you by your health care provider. Make sure you discuss any questions you have with your health care provider.  Document Released: 07/02/2012 Document Revised: 05/25/2017 Document Reviewed: 09/20/2016  [employer] Interactive Patient Education © 2018 [employer] Healthy Eating  Following a healthy eating pattern may help you to achieve and maintain a healthy body weight, reduce the risk of chronic disease, and live a long and productive life. It is important to follow a healthy eating pattern at an appropriate calorie level for your body. Your nutritional needs should be met primarily through food by choosing a variety of nutrient-rich foods.  What are tips for following this plan?  Reading food labels  Read labels and choose the following:  Reduced or low sodium.  Juices with 100% fruit juice.  Foods with low saturated fats and high polyunsaturated and monounsaturated fats.  Foods with whole grains, such as whole wheat, cracked wheat, brown rice, and wild rice.  Whole grains that are fortified with folic acid. This is recommended for women who are pregnant or who want to become pregnant.  Read labels and avoid the following:  Foods with a lot of added sugars. These include foods that contain brown sugar, corn sweetener, corn syrup, dextrose, fructose, glucose, high-fructose corn syrup, honey, invert sugar, lactose, malt syrup, maltose, molasses, raw sugar, sucrose, trehalose, or turbinado sugar.  Do not eat more than the following amounts of added sugar per day:  6 teaspoons (25 g) for women.  9 teaspoons (38 g) for men.  Foods that contain processed or refined starches and grains.  Refined grain products, such as white flour, degermed cornmeal, white bread, and white rice.  Shopping  Choose nutrient-rich snacks, such as vegetables, whole fruits, and nuts. Avoid high-calorie and high-sugar snacks, such as potato chips, fruit snacks,  "and candy.  Use oil-based dressings and spreads on foods instead of solid fats such as butter, stick margarine, or cream cheese.  Limit pre-made sauces, mixes, and \"instant\" products such as flavored rice, instant noodles, and ready-made pasta.  Try more plant-protein sources, such as tofu, tempeh, black beans, edamame, lentils, nuts, and seeds.  Explore eating plans such as the Mediterranean diet or vegetarian diet.  Cooking  Use oil to sauté or stir-sampson foods instead of solid fats such as butter, stick margarine, or lard.  Try baking, boiling, grilling, or broiling instead of frying.  Remove the fatty part of meats before cooking.  Steam vegetables in water or broth.  Meal planning    At meals, imagine dividing your plate into fourths:  One-half of your plate is fruits and vegetables.  One-fourth of your plate is whole grains.  One-fourth of your plate is protein, especially lean meats, poultry, eggs, tofu, beans, or nuts.  Include low-fat dairy as part of your daily diet.     Lifestyle  Choose healthy options in all settings, including home, work, school, restaurants, or stores.  Prepare your food safely:  Wash your hands after handling raw meats.  Keep food preparation surfaces clean by regularly washing with hot, soapy water.  Keep raw meats separate from ready-to-eat foods, such as fruits and vegetables.  , meat, poultry, and eggs to the recommended internal temperature.  Store foods at safe temperatures. In general:  Keep cold foods at 40°F (4.4°C) or below.  Keep hot foods at 140°F (60°C) or above.  Keep your freezer at 0°F (-17.8°C) or below.  Foods are no longer safe to eat when they have been between the temperatures of 40°-140°F (4.4-60°C) for more than 2 hours.  What foods should I eat?  Fruits  Aim to eat 2 cup-equivalents of fresh, canned (in natural juice), or frozen fruits each day. Examples of 1 cup-equivalent of fruit include 1 small apple, 8 large strawberries, 1 cup canned fruit, ½ " cup dried fruit, or 1 cup 100% juice.  Vegetables  Aim to eat 2½-3 cup-equivalents of fresh and frozen vegetables each day, including different varieties and colors. Examples of 1 cup-equivalent of vegetables include 2 medium carrots, 2 cups raw, leafy greens, 1 cup chopped vegetable (raw or cooked), or 1 medium baked potato.  Grains  Aim to eat 6 ounce-equivalents of whole grains each day. Examples of 1 ounce-equivalent of grains include 1 slice of bread, 1 cup ready-to-eat cereal, 3 cups popcorn, or ½ cup cooked rice, pasta, or cereal.  Meats and other proteins  Aim to eat 5-6 ounce-equivalents of protein each day. Examples of 1 ounce-equivalent of protein include 1 egg, 1/2 cup nuts or seeds, or 1 tablespoon (16 g) peanut butter. A cut of meat or fish that is the size of a deck of cards is about 3-4 ounce-equivalents.  Of the protein you eat each week, try to have at least 8 ounces come from seafood. This includes salmon, trout, herring, and anchovies.  Dairy  Aim to eat 3 cup-equivalents of fat-free or low-fat dairy each day. Examples of 1 cup-equivalent of dairy include 1 cup (240 mL) milk, 8 ounces (250 g) yogurt, 1½ ounces (44 g) natural cheese, or 1 cup (240 mL) fortified soy milk.  Fats and oils  Aim for about 5 teaspoons (21 g) per day. Choose monounsaturated fats, such as canola and olive oils, avocados, peanut butter, and most nuts, or polyunsaturated fats, such as sunflower, corn, and soybean oils, walnuts, pine nuts, sesame seeds, sunflower seeds, and flaxseed.  Beverages  Aim for six 8-oz glasses of water per day. Limit coffee to three to five 8-oz cups per day.  Limit caffeinated beverages that have added calories, such as soda and energy drinks.  Limit alcohol intake to no more than 1 drink a day for nonpregnant women and 2 drinks a day for men. One drink equals 12 oz of beer (355 mL), 5 oz of wine (148 mL), or 1½ oz of hard liquor (44 mL).  Seasoning and other foods  Avoid adding excess amounts of  salt to your foods. Try flavoring foods with herbs and spices instead of salt.  Avoid adding sugar to foods.  Try using oil-based dressings, sauces, and spreads instead of solid fats.  This information is based on general [place] nutrition guidelines. For more information, visit choosemyplate.gov. Exact amounts may vary based on your nutrition needs.  Summary  A healthy eating plan may help you to maintain a healthy weight, reduce the risk of chronic diseases, and stay active throughout your life.  Plan your meals. Make sure you eat the right portions of a variety of nutrient-rich foods.  Try baking, boiling, grilling, or broiling instead of frying.  Choose healthy options in all settings, including home, work, school, restaurants, or stores.  This information is not intended to replace advice given to you by your health care provider. Make sure you discuss any questions you have with your health care provider.  Document Revised: 04/01/2019 Document Reviewed: 04/01/2019  [employer] Patient Education © 2021 [employer]    Exercising to Stay Healthy  To become healthy and stay healthy, it is recommended that you do moderate-intensity and vigorous-intensity exercise. You can tell that you are exercising at a moderate intensity if your heart starts beating faster and you start breathing faster but can still hold a conversation. You can tell that you are exercising at a vigorous intensity if you are breathing much harder and faster and cannot hold a conversation while exercising.  Exercising regularly is important. It has many health benefits, such as:  Improving overall fitness, flexibility, and endurance.  Increasing bone density.  Helping with weight control.  Decreasing body fat.  Increasing muscle strength.  Reducing stress and tension.  Improving overall health.  How often should I exercise?  Choose an activity that you enjoy, and set realistic goals. Your health care provider can help you make an activity plan that  works for you.  Exercise regularly as told by your health care provider. This may include:  Doing strength training two times a week, such as:  Lifting weights.  Using resistance bands.  Push-ups.  Sit-ups.  Yoga.  Doing a certain intensity of exercise for a given amount of time. Choose from these options:  A total of 150 minutes of moderate-intensity exercise every week.  A total of 75 minutes of vigorous-intensity exercise every week.  A mix of moderate-intensity and vigorous-intensity exercise every week.  Children, pregnant women, people who have not exercised regularly, people who are overweight, and older adults may need to talk with a health care provider about what activities are safe to do. If you have a medical condition, be sure to talk with your health care provider before you start a new exercise program.  What are some exercise ideas?    Moderate-intensity exercise ideas include:  Walking 1 mile (1.6 km) in about 15 minutes.  Biking.  Hiking.  Golfing.  Dancing.  Water aerobics.  Vigorous-intensity exercise ideas include:  Walking 4.5 miles (7.2 km) or more in about 1 hour.  Jogging or running 5 miles (8 km) in about 1 hour.  Biking 10 miles (16.1 km) or more in about 1 hour.  Lap swimming.  Roller-skating or in-line skating.  Cross-country skiing.  Vigorous competitive sports, such as football, basketball, and soccer.  Jumping rope.  Aerobic dancing.  What are some everyday activities that can help me to get exercise?  Yard work, such as:  Pushing a .  Raking and bagging leaves.  Washing your car.  Pushing a stroller.  Shoveling snow.  Gardening.  Washing windows or floors.  How can I be more active in my day-to-day activities?  Use stairs instead of an elevator.  Take a walk during your lunch break.  If you drive, park your car farther away from your work or school.  If you take public transportation, get off one stop early and walk the rest of the way.  Stand up or walk around during all  of your indoor phone calls.  Get up, stretch, and walk around every 30 minutes throughout the day.  Enjoy exercise with a friend. Support to continue exercising will help you keep a regular routine of activity.  What guidelines can I follow while exercising?  Before you start a new exercise program, talk with your health care provider.  Do not exercise so much that you hurt yourself, feel dizzy, or get very short of breath.  Wear comfortable clothes and wear shoes with good support.  Drink plenty of water while you exercise to prevent dehydration or heat stroke.  Work out until your breathing and your heartbeat get faster.  Where to find more information  [place] Department of Health and Human Services: www.hhs.gov  Centers for Disease Control and Prevention (CDC): www.cdc.gov  Summary  Exercising regularly is important. It will improve your overall fitness, flexibility, and endurance.  Regular exercise also will improve your overall health. It can help you control your weight, reduce stress, and improve your bone density.  Do not exercise so much that you hurt yourself, feel dizzy, or get very short of breath.  Before you start a new exercise program, talk with your health care provider.  This information is not intended to replace advice given to you by your health care provider. Make sure you discuss any questions you have with your health care provider.  Document Revised: 11/30/2018 Document Reviewed: 11/08/2018  [employer] Patient Education © 2021 [employer]

## 2024-08-15 NOTE — PROGRESS NOTES
Female Physical Note      Date: 08/15/2024   Patient Name: Jud Garnica  : 1960   MRN: [id number]     Chief Complaint:    Chief Complaint   Patient presents with    Annual Exam       History of Present Illness: Jud Garnica is a 63 y.o. female who is here today for their annual health maintenance and physical.    HPI  History of Present Illness  The patient presents for an annual checkup.    She has been experiencing a cold, accompanied by wheezing and coughing. Her breathing has improved since starting Symbicort, which she uses as needed. She also reports feeling depressed following the loss of her sister two weeks ago, whom she had been caring for. She has found comfort in talking to her preacher and close friends. She is due for a dermatology appointment with Dr. Diop and has an upcoming eye doctor visit on the  of this month. She maintains regular dental checkups. She denies any vaginal pain or bleeding.    FAMILY HISTORY  Her baby sister  of cervical cancer.    Asthma  - last visit started symbicort    Gerd  - nexium, working well    Subjective      Review of Systems:   Review of Systems    Past Medical History, Social History, Family History and Care Team were all reviewed with patient and updated as appropriate.     Medications:     Current Outpatient Medications:     albuterol sulfate  (90 Base) MCG/ACT inhaler, Inhale 2 puffs Every 4 (Four) Hours As Needed for Wheezing., Disp: 18 g, Rfl: 5    budesonide-formoterol (Symbicort) 80-4.5 MCG/ACT inhaler, Inhale 2 puffs 2 (Two) Times a Day., Disp: 10.2 g, Rfl: 12    esomeprazole (nexIUM) 40 MG capsule, Take 1 capsule by mouth Daily., Disp: 90 capsule, Rfl: 1    ipratropium-albuterol (DUO-NEB) 0.5-2.5 mg/3 ml nebulizer, Take 3 mL by nebulization 4 (Four) Times a Day., Disp: 360 mL, Rfl: 0    Multiple Vitamins-Minerals (MULTIVITAMINS/MINERALS ADULT PO), Take  by mouth., Disp: , Rfl:     rOPINIRole (REQUIP) 0.5 MG tablet, TAKE 1  "TABLET BY MOUTH AT NIGHT ONE  HOUR  BEFORE  BEDTIME, Disp: 90 tablet, Rfl: 0    Allergies:   Allergies   Allergen Reactions    Codeine Irritability    Nyquil Multi-Symptom [Pseudoeph-Doxylamine-Dm-Apap] Irritability    Meclizine Other (See Comments) and GI Intolerance     \"Not sure, but knows it doesn't make her feel good\"       Immunizations:  Td/Tdap(Booster Q 10 yrs):  UTD  Flu (Yearly):  Recommend this fall  Pneumonia: UTD  Shingrix:  due, refuses  RSV: due  Immunization History   Administered Date(s) Administered    31-influenza Vac Quardvalent Preservativ 01/17/2023    COVID-19 (MODERNA) 1st,2nd,3rd Dose Monovalent 09/20/2021, 10/18/2021    Fluzone (or Fluarix & Flulaval for VFC) >6mos 01/17/2023, 12/13/2023    Hepatitis A 11/02/2019    Pneumococcal Conjugate 20-Valent (PCV20) 07/21/2022    Tdap 04/23/2018       Colorectal Screening:   UTD  Last Completed Colonoscopy            COLORECTAL CANCER SCREENING (COLONOSCOPY - Every 10 Years) Next due on 5/18/2028 04/05/2024  COLOGUARD SCANNED    04/05/2024  Cologuard - ,    05/18/2018  SCANNED - COLONOSCOPY    05/18/2018  SCANNED - COLONOSCOPY    05/18/2018  COLONOSCOPY (Done)    Only the first 5 history entries have been loaded, but more history exists.                  Pap:  UTD  Last Completed Pap Smear            PAP SMEAR (Every 3 Years) Next due on 7/21/2025 07/21/2022  LIQUID-BASED PAP SMEAR, [facility])    05/04/2018  SCANNED - PAP SMEAR                   Mammogram:  due  Last Completed Mammogram       This patient has no relevant Health Maintenance data.               Hep C (Age 18-79 once):  NR in past      Lipid panel: due  The ASCVD Risk score (Eddie DK, et al., 2019) failed to calculate for the following reasons:    The valid total cholesterol range is 130 to 320 mg/dL    Dermatology: Dr. Diop  Ophthalmologist: regularly  Dentist: regularly    Tobacco Use: Low Risk  (8/15/2024)    Patient History     Smoking Tobacco Use: Never    " " Smokeless Tobacco Use: Never     Passive Exposure: Never       Social History     Substance and Sexual Activity   Alcohol Use No        Social History     Substance and Sexual Activity   Drug Use Never        Diet/Physical activity:   Very active, walking regularly. Well balanced diet      PHQ-2 Depression Screening  Little interest or pleasure in doing things? 1-->several days   Feeling down, depressed, or hopeless? 2-->more than half the days   PHQ-2 Total Score 12     PHQ-9 Total Score: 12     PHQ-9 Depression Screening  Little interest or pleasure in doing things? 1-->several days   Feeling down, depressed, or hopeless? 2-->more than half the days   Trouble falling or staying asleep, or sleeping too much? 3-->nearly every day   Feeling tired or having little energy? 3-->nearly every day   Poor appetite or overeating? 3-->nearly every day   Feeling bad about yourself - or that you are a failure or have let yourself or your family down? 0-->not at all   Trouble concentrating on things, such as reading the newspaper or watching television? 0-->not at all   Moving or speaking so slowly that other people could have noticed? Or the opposite - being so fidgety or restless that you have been moving around a lot more than usual? 0-->not at all   Thoughts that you would be better off dead, or of hurting yourself in some way?     PHQ-9 Total Score 12   If you checked off any problems, how difficult have these problems made it for you to do your work, take care of things at home, or get along with other people? not difficult at all         Objective     Physical Exam:  Vital Signs:   Vitals:    08/15/24 0823   BP: 108/70   Pulse: 86   Resp: 16   Temp: 97.3 °F (36.3 °C)   TempSrc: Temporal   Weight: 59 kg (130 lb)   Height: 169.5 cm (66.73\")     Body mass index is 20.52 kg/m².     Physical Exam  Vitals reviewed.   Constitutional:       General: She is not in acute distress.     Appearance: Normal appearance. She is normal " weight. She is not ill-appearing or toxic-appearing.   HENT:      Head: Normocephalic and atraumatic.      Right Ear: External ear normal.      Left Ear: External ear normal.      Nose: Nose normal. No congestion.      Mouth/Throat:      Mouth: Mucous membranes are moist.      Pharynx: No oropharyngeal exudate or posterior oropharyngeal erythema.   Eyes:      General: No scleral icterus.     Extraocular Movements: Extraocular movements intact.      Pupils: Pupils are equal, round, and reactive to light.   Cardiovascular:      Rate and Rhythm: Normal rate and regular rhythm.      Pulses: Normal pulses.      Heart sounds: Normal heart sounds.   Pulmonary:      Effort: Pulmonary effort is normal. No respiratory distress.      Breath sounds: Normal breath sounds. No stridor. No wheezing, rhonchi or rales.   Abdominal:      General: Abdomen is flat. Bowel sounds are normal. There is no distension.      Palpations: Abdomen is soft. There is no mass.      Tenderness: There is no abdominal tenderness. There is no guarding or rebound.   Musculoskeletal:         General: No swelling or tenderness. Normal range of motion.      Cervical back: Normal range of motion and neck supple. No rigidity or tenderness.   Lymphadenopathy:      Cervical: No cervical adenopathy.   Skin:     General: Skin is warm and dry.      Capillary Refill: Capillary refill takes less than 2 seconds.      Findings: No erythema or rash.   Neurological:      General: No focal deficit present.      Mental Status: She is alert and oriented to person, place, and time.   Psychiatric:         Mood and Affect: Mood normal.         Behavior: Behavior normal.         Judgment: Judgment normal.       Physical Exam  Lungs are clear. No crackles or wheezing. Air movement is great throughout.      Procedures    Results        Assessment / Plan      Assessment/Plan:   Problem List Items Addressed This Visit       Restless leg syndrome    Relevant Orders    TSH    Family  history of cervical cancer     Other Visit Diagnoses       Routine health maintenance    -  Primary    Relevant Orders    CBC Auto Differential    Comprehensive Metabolic Panel    Screening for ischemic heart disease        Relevant Orders    Lipid Panel    Encounter for vaccination        Relevant Medications    RSVPreF3 Vac Recomb Adjuvanted (AREXVY) 120 MCG/0.5ML reconstituted suspension injection    Breast cancer screening by mammogram        Relevant Orders    Mammo Screening Digital Tomosynthesis Bilateral With CAD    Other fatigue        Relevant Orders    TSH    T4, free    Iron and TIBC    Ferritin    Vitamin B12    Grief              Assessment & Plan  1. Moderate persistent asthma  Chronic, improving  She reports wheezing and coughing up mucus. The wheezing is localized to her chest per patient. Her exam is without wheezing. She has been using Symbicort as needed but will now use it twice daily for the next few weeks to reduce inflammation and improve breathing. If symptoms persist, she should return to clinic. Return if any new fever    2. Grief response.  She is experiencing acute grief following the recent loss of her sister. She has support from her preacher and close friends. No medication is recommended at this time, but talk therapy was discussed as an option if needed in the future. She is advised to reach out if her symptoms do not improve over time.    3. Health Maintenance.  She is up to date on her tetanus and pneumonia vaccines. An influenza vaccine is recommended for the fall, and an RSV vaccine will be administered today. She refused shingles vaccination. Her last Pap smear was in July 2022, and she is due for a repeat in 2025. A mammogram will be scheduled. Routine labs including cholesterol, blood counts, kidney function, and thyroid tests will be ordered.    Follow-up  The patient will follow up in 6 months.      Healthcare Maintenance:  Counseling provided based on age appropriate  USPSTF guidelines.  BMI is within normal parameters. No other follow-up for BMI required.    Jud Garnica voices understanding and acceptance of this advice and will call back with any further questions or concerns. AVS with preventive healthcare tips printed for patient.     “Discussed risks/benefits to vaccination, reviewed components of the vaccine, discussed VIS, discussed informed consent, informed consent obtained. Patient/Parent was allowed to accept or refuse vaccine. Questions answered to satisfactory state of patient/Parent. We reviewed typical age appropriate and seasonally appropriate vaccinations. Reviewed immunization history and updated state vaccination form as needed. Patient was counseled on COVID-19  Influenza  Zoster  RSV    Follow Up:   Return in about 6 months (around 2/15/2025) for Recheck breathing/asthma.    Dov Leon MD   [facility]    Patient or patient representative verbalized consent for the use of Ambient Listening during the visit with  Dov Leon MD for chart documentation. 8/15/2024  08:55 EDT

## 2024-08-30 ENCOUNTER — OFFICE VISIT (OUTPATIENT)
Dept: INTERNAL MEDICINE | Facility: CLINIC | Age: 64
End: 2024-08-30
Payer: COMMERCIAL

## 2024-08-30 VITALS
HEART RATE: 90 BPM | WEIGHT: 128.25 LBS | OXYGEN SATURATION: 93 % | DIASTOLIC BLOOD PRESSURE: 78 MMHG | TEMPERATURE: 97.8 F | SYSTOLIC BLOOD PRESSURE: 106 MMHG | BODY MASS INDEX: 20.25 KG/M2 | RESPIRATION RATE: 20 BRPM

## 2024-08-30 DIAGNOSIS — R91.1 PULMONARY NODULE: ICD-10-CM

## 2024-08-30 DIAGNOSIS — J18.9 PNEUMONIA OF RIGHT LOWER LOBE DUE TO INFECTIOUS ORGANISM: Primary | ICD-10-CM

## 2024-08-30 NOTE — PROGRESS NOTES
Follow Up Office Visit      Date: 2024   Patient Name: Jud Garnica  : 1960   MRN: [id number]     Chief Complaint:    Chief Complaint   Patient presents with    Hospital Follow Up Visit       History of Present Illness: Jud Garnica is a 63 y.o. female who is here today to follow up with our clinic after ER visit on 24.    HPI  History of Present Illness  The patient presents for evaluation of a lung nodule.    She experienced an episode of fatigue, malaise, cough and chills last Friday, accompanied by shaking and weakness. She had been feeling unwell since the previous night. During her ER  stay, she was diagnosed with pneumonia. Initially, she did not notice any improvement from the antibiotics, but later felt better. She completed a course of Augmentin and azithromycin, with the last dose of Augmentin taken this morning. She reports no current symptoms such as coughing up blood, fever, unintentional weight loss, or night sweats. Breathing has returned to baseline.     Supplemental Information  She takes vitamin B.    SOCIAL HISTORY  She denies smoking.    FAMILY HISTORY  Her father  of lung cancer. Her mother had COPD and  of sepsis. She denies family history of liver problems.     - I pesonally reviewed note by Dr. Kris Scott of [facility], [facility], dated 24.  Prescribed augmentin bid for 7 days, azithromycin x5 days, promeathzine dm for cough prn.     - I personally reviewed the following labs and imaging dated 24  - CBC: WBC of 14.1, otherwise normal. Neutrophilic predominance  - Troponin 9, normal  - D dimer 0.37, normal  - CMP notable for glucose of 122, bilirubin of 1.5, otherwise normal  - , mildly elevated  -CXR: FINDINGS: The cardiac silhouette demonstrates mild cardiomegaly. The   mediastinum is unremarkable. There is severe emphysema. There is no   pleural effusion. There is no pneumothorax.   There is a 1.9 cm nodule  "  in the left cardiophrenic angle concerning for a pulmonary nodule. There   is left lung basilar atelectasis/scarring.         Subjective      Review of Systems:   Review of Systems    I have reviewed the patients family history, social history, past medical history, past surgical history and have updated it as appropriate.     Medications:     Current Outpatient Medications:     albuterol sulfate  (90 Base) MCG/ACT inhaler, Inhale 2 puffs Every 4 (Four) Hours As Needed for Wheezing., Disp: 18 g, Rfl: 5    budesonide-formoterol (Symbicort) 80-4.5 MCG/ACT inhaler, Inhale 2 puffs 2 (Two) Times a Day., Disp: 10.2 g, Rfl: 12    esomeprazole (nexIUM) 40 MG capsule, Take 1 capsule by mouth Daily., Disp: 90 capsule, Rfl: 1    ipratropium-albuterol (DUO-NEB) 0.5-2.5 mg/3 ml nebulizer, Take 3 mL by nebulization 4 (Four) Times a Day., Disp: 360 mL, Rfl: 0    Multiple Vitamins-Minerals (MULTIVITAMINS/MINERALS ADULT PO), Take  by mouth., Disp: , Rfl:     rOPINIRole (REQUIP) 0.5 MG tablet, TAKE 1 TABLET BY MOUTH AT NIGHT ONE  HOUR  BEFORE  BEDTIME, Disp: 90 tablet, Rfl: 0    Allergies:   Allergies   Allergen Reactions    Codeine Irritability    Nyquil Multi-Symptom [Pseudoeph-Doxylamine-Dm-Apap] Irritability    Meclizine Other (See Comments) and GI Intolerance     \"Not sure, but knows it doesn't make her feel good\"       Objective     Physical Exam: Please see above  Vital Signs:   Vitals:    08/30/24 1255   BP: 106/78   BP Location: Left arm   Patient Position: Sitting   Cuff Size: Adult   Pulse: 90   Resp: 20   Temp: 97.8 °F (36.6 °C)   TempSrc: Temporal   SpO2: 93%   Weight: 58.2 kg (128 lb 4 oz)   PainSc: 0-No pain     Body mass index is 20.25 kg/m².  BMI is within normal parameters. No other follow-up for BMI required.       Physical Exam  Vitals reviewed.   Constitutional:       General: She is not in acute distress.     Appearance: Normal appearance. She is normal weight. She is not ill-appearing or " toxic-appearing.   HENT:      Head: Normocephalic and atraumatic.      Right Ear: External ear normal.      Left Ear: External ear normal.      Nose: Nose normal. No congestion.      Mouth/Throat:      Mouth: Mucous membranes are moist.      Pharynx: No oropharyngeal exudate or posterior oropharyngeal erythema.   Eyes:      General: No scleral icterus.     Extraocular Movements: Extraocular movements intact.   Cardiovascular:      Rate and Rhythm: Normal rate and regular rhythm.      Pulses: Normal pulses.      Heart sounds: Normal heart sounds.   Pulmonary:      Effort: Pulmonary effort is normal. No respiratory distress.      Breath sounds: Normal breath sounds. No stridor. No wheezing, rhonchi or rales.   Abdominal:      General: Abdomen is flat. There is no distension.      Palpations: Abdomen is soft.   Musculoskeletal:         General: No swelling or tenderness. Normal range of motion.      Cervical back: Normal range of motion and neck supple. No rigidity or tenderness.   Lymphadenopathy:      Cervical: No cervical adenopathy.      Right cervical: No superficial cervical adenopathy.     Left cervical: No superficial or deep cervical adenopathy.      Upper Body:      Right upper body: No supraclavicular adenopathy.      Left upper body: No supraclavicular adenopathy.   Skin:     General: Skin is warm and dry.      Capillary Refill: Capillary refill takes less than 2 seconds.   Neurological:      General: No focal deficit present.      Mental Status: She is alert and oriented to person, place, and time.   Psychiatric:         Mood and Affect: Mood normal.         Behavior: Behavior normal.         Judgment: Judgment normal.       Physical Exam        Procedures    Results:   Labs:   TSH   Date Value Ref Range Status   08/15/2024 1.230 0.270 - 4.200 uIU/mL Final      Results        Imaging:   No valid procedures specified.     Assessment / Plan      Assessment/Plan:   Problem List Items Addressed This Visit     None  Visit Diagnoses       Pneumonia of right lower lobe due to infectious organism    -  Primary    Pulmonary nodule        Relevant Orders    Ambulatory Referral to Lung Nodule Clinic    CT Chest Without Contrast            Assessment & Plan  1. Lung nodule.  During her ER Visit an incidental Left lower lobe nodule of 1.9CM was noted at the left cardophrenic angle. Based on her history of uterine cancer, her solitary pulmonary nodule malignancy risk score (ortega clinic model) is 6% probability of malignancy. She has no active B symptoms, no lymphadenopathy, and was not a smoker (but did have 2nd hand exposure). We discussed her relatively low risk and differential diagnosis for a nodule such as infection, granuloma, benign changes, and less likely malignancy. A CT scan has been ordered for further evaluation. A referral to the lung nodule clinic will be made. If the CT scan results are normal, the referral will be cancelled. No repeat labs are necessary at this time.    2. Pneumonia.  She was diagnosed with pneumonia and treated with Augmentin and azithromycin. She completed the course of antibiotics, with the last dose taken this morning. She reported initial improvement but experienced diarrhea, likely due to the antibiotics. A probiotic was recommended to help with this side effect. She now is feeling back to baseline    3. Emphysema.  Emphysema changes were noted on imaging. The CT scan will provide more information regarding the extent of emphysema. There is no family history of COPD or emphysema, and she has never smoked. The CT scan will help differentiate between emphysema and other potential lung issues. If emphysema noted, will get alpha 1 antitrypsin testing (no family history).    Follow-up  She is scheduled for a follow-up visit in February 2025.       Follow Up:   Return if symptoms worsen or fail to improve.    I spent 49 minutes caring for Jud on this date of service. This time includes time  spent by me in the following activities: preparing for the visit, reviewing tests, performing a medically appropriate examination and/or evaluation, counseling and educating the patient/family/caregiver, referring and communicating with other health care professionals, documenting information in the medical record, independently interpreting results and communicating that information with the patient/family/caregiver, and ordering test(s)    Dov Leon MD   [facility]    Patient or patient representative verbalized consent for the use of Ambient Listening during the visit with  Dov Leon MD for chart documentation. 8/30/2024  13:29 EDT

## 2024-09-04 DIAGNOSIS — G25.81 RESTLESS LEG SYNDROME: ICD-10-CM

## 2024-09-05 RX ORDER — ROPINIROLE 0.5 MG/1
0.5 TABLET, FILM COATED ORAL NIGHTLY
Qty: 90 TABLET | Refills: 0 | Status: SHIPPED | OUTPATIENT
Start: 2024-09-05

## 2024-09-12 ENCOUNTER — HOSPITAL ENCOUNTER (OUTPATIENT)
Dept: CT IMAGING | Facility: HOSPITAL | Age: 64
Discharge: HOME | End: 2024-09-12
Admitting: STUDENT IN AN ORGANIZED HEALTH CARE EDUCATION/TRAINING PROGRAM
Payer: COMMERCIAL

## 2024-09-12 DIAGNOSIS — R91.1 PULMONARY NODULE: ICD-10-CM

## 2024-09-12 PROCEDURE — 71250 CT THORAX DX C-: CPT

## 2024-09-22 DIAGNOSIS — E04.1 THYROID NODULE: Primary | ICD-10-CM

## 2024-09-23 ENCOUNTER — TELEPHONE (OUTPATIENT)
Dept: INTERNAL MEDICINE | Facility: CLINIC | Age: 64
End: 2024-09-23
Payer: COMMERCIAL

## 2024-10-08 ENCOUNTER — HOSPITAL ENCOUNTER (OUTPATIENT)
Dept: ULTRASOUND IMAGING | Facility: HOSPITAL | Age: 64
Discharge: HOME | End: 2024-10-08
Admitting: STUDENT IN AN ORGANIZED HEALTH CARE EDUCATION/TRAINING PROGRAM
Payer: MEDICARE

## 2024-10-08 DIAGNOSIS — E04.1 THYROID NODULE: ICD-10-CM

## 2024-10-08 PROCEDURE — 76536 US EXAM OF HEAD AND NECK: CPT

## 2024-10-17 DIAGNOSIS — K21.00 GASTROESOPHAGEAL REFLUX DISEASE WITH ESOPHAGITIS WITHOUT HEMORRHAGE: ICD-10-CM

## 2024-10-17 RX ORDER — ESOMEPRAZOLE MAGNESIUM 40 MG/1
40 CAPSULE, DELAYED RELEASE ORAL DAILY
Qty: 90 CAPSULE | Refills: 0 | Status: SHIPPED | OUTPATIENT
Start: 2024-10-17

## 2024-10-18 ENCOUNTER — OFFICE VISIT (OUTPATIENT)
Dept: INTERNAL MEDICINE | Facility: CLINIC | Age: 64
End: 2024-10-18
Payer: MEDICARE

## 2024-10-18 VITALS
HEART RATE: 78 BPM | BODY MASS INDEX: 20.92 KG/M2 | OXYGEN SATURATION: 97 % | TEMPERATURE: 97.5 F | DIASTOLIC BLOOD PRESSURE: 70 MMHG | SYSTOLIC BLOOD PRESSURE: 110 MMHG | RESPIRATION RATE: 18 BRPM | WEIGHT: 132.5 LBS

## 2024-10-18 DIAGNOSIS — E04.1 THYROID NODULE: Primary | ICD-10-CM

## 2024-10-18 DIAGNOSIS — Z23 ENCOUNTER FOR VACCINATION: ICD-10-CM

## 2024-10-18 PROCEDURE — 1126F AMNT PAIN NOTED NONE PRSNT: CPT | Performed by: STUDENT IN AN ORGANIZED HEALTH CARE EDUCATION/TRAINING PROGRAM

## 2024-10-18 PROCEDURE — 1160F RVW MEDS BY RX/DR IN RCRD: CPT | Performed by: STUDENT IN AN ORGANIZED HEALTH CARE EDUCATION/TRAINING PROGRAM

## 2024-10-18 PROCEDURE — 90656 IIV3 VACC NO PRSV 0.5 ML IM: CPT | Performed by: STUDENT IN AN ORGANIZED HEALTH CARE EDUCATION/TRAINING PROGRAM

## 2024-10-18 PROCEDURE — G0008 ADMIN INFLUENZA VIRUS VAC: HCPCS | Performed by: STUDENT IN AN ORGANIZED HEALTH CARE EDUCATION/TRAINING PROGRAM

## 2024-10-18 PROCEDURE — 99214 OFFICE O/P EST MOD 30 MIN: CPT | Performed by: STUDENT IN AN ORGANIZED HEALTH CARE EDUCATION/TRAINING PROGRAM

## 2024-10-18 PROCEDURE — 1159F MED LIST DOCD IN RCRD: CPT | Performed by: STUDENT IN AN ORGANIZED HEALTH CARE EDUCATION/TRAINING PROGRAM

## 2024-10-18 NOTE — PROGRESS NOTES
Follow Up Office Visit      Date: 10/18/2024   Patient Name: Jud Garnica  : 1960   MRN: [id number]     Chief Complaint:    Chief Complaint   Patient presents with    Results     us       History of Present Illness: Jud Garnica is a 63 y.o. female who is here today to follow up with imaging results.    HPI  History of Present Illness  The patient presents for evaluation of thyroid nodule.    She reports no difficulty swallowing, a problem she experienced a few years ago which required esophageal dilation. She has not received her influenza vaccine this year and expresses interest in getting it today. However, she declines the COVID-19 vaccine. She is currently taking DayQuil as she can not tolerate NyQuil. No palpitations.     FAMILY HISTORY  Her father had lung cancer. Her sister had cervical cancer. Her paternal grandmother had lung cancer. Her maternal grandfather had aneurysm.         Subjective      Review of Systems:   Review of Systems    I have reviewed the patients family history, social history, past medical history, past surgical history and have updated it as appropriate.     Medications:     Current Outpatient Medications:     albuterol sulfate  (90 Base) MCG/ACT inhaler, Inhale 2 puffs Every 4 (Four) Hours As Needed for Wheezing., Disp: 18 g, Rfl: 5    budesonide-formoterol (Symbicort) 80-4.5 MCG/ACT inhaler, Inhale 2 puffs 2 (Two) Times a Day., Disp: 10.2 g, Rfl: 12    esomeprazole (nexIUM) 40 MG capsule, Take 1 capsule by mouth once daily, Disp: 90 capsule, Rfl: 0    ipratropium-albuterol (DUO-NEB) 0.5-2.5 mg/3 ml nebulizer, Take 3 mL by nebulization 4 (Four) Times a Day., Disp: 360 mL, Rfl: 0    Multiple Vitamins-Minerals (MULTIVITAMINS/MINERALS ADULT PO), Take  by mouth., Disp: , Rfl:     rOPINIRole (REQUIP) 0.5 MG tablet, TAKE 1 TABLET BY MOUTH AT NIGHT ONE  HOUR  BEFORE  BEDTIME, Disp: 90 tablet, Rfl: 0    Allergies:   Allergies   Allergen Reactions    Codeine  "Irritability    Nyquil Multi-Symptom [Pseudoeph-Doxylamine-Dm-Apap] Irritability    Meclizine Other (See Comments) and GI Intolerance     \"Not sure, but knows it doesn't make her feel good\"       Objective     Physical Exam: Please see above  Vital Signs:   Vitals:    10/18/24 1050   BP: 110/70   BP Location: Left arm   Patient Position: Sitting   Cuff Size: Adult   Pulse: 78   Resp: 18   Temp: 97.5 °F (36.4 °C)   TempSrc: Temporal   SpO2: 97%   Weight: 60.1 kg (132 lb 8 oz)   PainSc: 0-No pain     Body mass index is 20.92 kg/m².  BMI is within normal parameters. No other follow-up for BMI required.       Physical Exam  Vitals reviewed.   Constitutional:       General: She is not in acute distress.     Appearance: Normal appearance. She is normal weight. She is not ill-appearing or toxic-appearing.   HENT:      Head: Normocephalic and atraumatic.      Right Ear: External ear normal.      Left Ear: External ear normal.      Nose: Nose normal.      Mouth/Throat:      Mouth: Mucous membranes are moist.   Eyes:      Extraocular Movements: Extraocular movements intact.   Neck:      Comments: Unable to appreciate thyroid nodule to palpation  Cardiovascular:      Rate and Rhythm: Normal rate and regular rhythm.      Pulses: Normal pulses.      Heart sounds: Normal heart sounds.   Pulmonary:      Effort: Pulmonary effort is normal.      Breath sounds: Normal breath sounds.   Abdominal:      General: Abdomen is flat. There is no distension.   Musculoskeletal:      Cervical back: Normal range of motion. No rigidity or tenderness.   Lymphadenopathy:      Cervical: No cervical adenopathy.   Skin:     General: Skin is warm and dry.      Capillary Refill: Capillary refill takes less than 2 seconds.   Neurological:      General: No focal deficit present.      Mental Status: She is alert and oriented to person, place, and time.   Psychiatric:         Mood and Affect: Mood normal.         Behavior: Behavior normal.         Judgment: " Judgment normal.       Physical Exam        Procedures    Results:   Labs:   TSH   Date Value Ref Range Status   08/15/2024 1.230 0.270 - 4.200 uIU/mL Final      Results  Laboratory Studies  TSH and free t4 levels were normal in August.    Imaging  CT scan showed a nodule in the left thyroid lobe. Ultrasound of the thyroid showed a 2 cm nodule in the left thyroid lobe with smooth margins and no calcifications.      Imaging:   No valid procedures specified.     Assessment / Plan      Assessment/Plan:   Problem List Items Addressed This Visit    None  Visit Diagnoses       Thyroid nodule    -  Primary    Relevant Orders    Ambulatory Referral to Endocrinology    Encounter for vaccination        Relevant Orders    Fluzone >6mos ([id number]) (Completed)            Assessment & Plan  1. Thyroid nodule.  Uncertain prognosis.  The patient's TSH and active thyroid hormone levels were within normal range as of August 2024. The ultrasound revealed a 2 cm isoechoic nodule on the lower pole of the left thyroid, with no calcifications detected. The nodule is mildly suspicious (TI-RADS 3) but does not currently require a biopsy. An ultrasound will be repeated in one year to monitor the nodule, and subsequent ultrasounds will be done at three and five years from the original time. A referral to an endocrinologist will be made for further evaluation and to review the imaging at patient's request. The endocrinologist may order additional tests if necessary. The patient reports no difficulty swallowing currently.    2. Medication Management.  The patient requested a refill for her reflux medication, Nexium. It was confirmed that a prescription was sent yesterday.    3. Health Maintenance.  She will receive her influenza vaccine today. She declined the COVID-19 vaccine.    Follow-up  Patient is scheduled for a follow-up visit in February 2025.       Follow Up:   Return if symptoms worsen or fail to improve.        Dov Leon MD    SANDRA Galan    Patient or patient representative verbalized consent for the use of Ambient Listening during the visit with  Dov Leon MD for chart documentation. 10/18/2024  11:30 EDT

## 2024-10-30 ENCOUNTER — OFFICE VISIT (OUTPATIENT)
Dept: PULMONOLOGY | Facility: CLINIC | Age: 64
End: 2024-10-30
Payer: MEDICARE

## 2024-10-30 VITALS
HEIGHT: 67 IN | WEIGHT: 131 LBS | OXYGEN SATURATION: 97 % | BODY MASS INDEX: 20.56 KG/M2 | RESPIRATION RATE: 16 BRPM | SYSTOLIC BLOOD PRESSURE: 112 MMHG | TEMPERATURE: 98 F | HEART RATE: 77 BPM | DIASTOLIC BLOOD PRESSURE: 76 MMHG

## 2024-10-30 DIAGNOSIS — J45.40 MODERATE PERSISTENT ASTHMA WITHOUT COMPLICATION: ICD-10-CM

## 2024-10-30 DIAGNOSIS — R91.1 LUNG NODULE: Primary | ICD-10-CM

## 2024-10-30 RX ORDER — ALBUTEROL SULFATE 90 UG/1
4 INHALANT RESPIRATORY (INHALATION) ONCE
Status: COMPLETED | OUTPATIENT
Start: 2024-10-30 | End: 2024-10-30

## 2024-10-30 RX ORDER — BUDESONIDE AND FORMOTEROL FUMARATE DIHYDRATE 80; 4.5 UG/1; UG/1
2 AEROSOL RESPIRATORY (INHALATION)
Qty: 10.2 G | Refills: 12 | Status: SHIPPED | OUTPATIENT
Start: 2024-10-30

## 2024-10-30 RX ORDER — ALBUTEROL SULFATE 90 UG/1
2 INHALANT RESPIRATORY (INHALATION) EVERY 4 HOURS PRN
Qty: 18 G | Refills: 5 | Status: SHIPPED | OUTPATIENT
Start: 2024-10-30

## 2024-10-30 RX ADMIN — ALBUTEROL SULFATE 4 PUFF: 90 INHALANT RESPIRATORY (INHALATION) at 13:40

## 2024-10-30 NOTE — PROGRESS NOTES
New Patient Pulmonary Office Visit      Patient Name: Jud Garnica    Referring Physician: Dov Leon MD    Chief Complaint:    Chief Complaint   Patient presents with    Pulmonary Nodule       History of Present Illness: Jud Garnica is a 64 y.o. female who is here today to establish care with Pulmonary.  Patient has a past medical history significant for allergic rhinitis and asthma.  He was referred to pulmonary for evaluation of pulmonary nodule.  Patient states that she has a history of asthma denies any chest pain, nausea, fever, chills.  Only uses her Symbicort on a as needed basis but gets 2-3 infections per year.  When that occurs she has to go to the PCP and get antibiotics.      Review of Systems:   Review of Systems   Constitutional:  Negative for chills, fatigue and fever.   HENT:  Negative for congestion and voice change.    Eyes:  Negative for blurred vision.   Respiratory:  Negative for cough, shortness of breath and wheezing.    Cardiovascular:  Negative for chest pain.   Skin:  Negative for dry skin.   Hematological:  Negative for adenopathy.   Psychiatric/Behavioral:  Negative for agitation and depressed mood.        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    Pneumonia 2018    Scoliosis 1998    Can't remember the correct date    Uterine cancer        Past Surgical History:   Past Surgical History:   Procedure Laterality Date    BREAST BIOPSY Right 06/08/2018    US bx    HYSTERECTOMY      age 29, partial       Family History:   Family History   Problem Relation Age of Onset    Dementia Mother         smoker    COPD Mother     Cancer Father         Lung, smoker    Breast cancer Sister     Cervical cancer Sister     Heart attack Maternal Grandfather     Anuerysm Maternal Grandfather     Heart attack Paternal Grandmother     Cancer  "Paternal Grandmother         lung    Dementia Paternal Grandfather     Ovarian cancer Neg Hx        Social History:   Social History     Socioeconomic History    Marital status:    Tobacco Use    Smoking status: Never     Passive exposure: Never    Smokeless tobacco: Never   Vaping Use    Vaping status: Never Used   Substance and Sexual Activity    Alcohol use: No    Drug use: Never    Sexual activity: Yes     Partners: Male     Birth control/protection: None, Hysterectomy     Comment: Partial Hysterectomy       Medications:     Current Outpatient Medications:     albuterol sulfate  (90 Base) MCG/ACT inhaler, Inhale 2 puffs Every 4 (Four) Hours As Needed for Wheezing., Disp: 18 g, Rfl: 5    budesonide-formoterol (Symbicort) 80-4.5 MCG/ACT inhaler, Inhale 2 puffs 2 (Two) Times a Day., Disp: 10.2 g, Rfl: 12    esomeprazole (nexIUM) 40 MG capsule, Take 1 capsule by mouth once daily, Disp: 90 capsule, Rfl: 0    ipratropium-albuterol (DUO-NEB) 0.5-2.5 mg/3 ml nebulizer, Take 3 mL by nebulization 4 (Four) Times a Day., Disp: 360 mL, Rfl: 0    Multiple Vitamins-Minerals (MULTIVITAMINS/MINERALS ADULT PO), Take  by mouth., Disp: , Rfl:     rOPINIRole (REQUIP) 0.5 MG tablet, TAKE 1 TABLET BY MOUTH AT NIGHT ONE  HOUR  BEFORE  BEDTIME, Disp: 90 tablet, Rfl: 0    Allergies:   Allergies   Allergen Reactions    Codeine Irritability    Nyquil Multi-Symptom [Pseudoeph-Doxylamine-Dm-Apap] Irritability    Meclizine Other (See Comments) and GI Intolerance     \"Not sure, but knows it doesn't make her feel good\"       Physical Exam:  Vital Signs:   Vitals:    10/30/24 1119   BP: 112/76   Pulse: 77   Resp: 16   Temp: 98 °F (36.7 °C)   SpO2: 97%  Comment: room air at resting   Weight: 59.4 kg (131 lb)   Height: 169.5 cm (66.73\")       Physical Exam  Vitals and nursing note reviewed.   Constitutional:       General: She is not in acute distress.     Appearance: She is well-developed and normal weight. She is not ill-appearing " or toxic-appearing.   HENT:      Head: Normocephalic and atraumatic.   Cardiovascular:      Rate and Rhythm: Normal rate and regular rhythm.      Pulses: Normal pulses.      Heart sounds: Normal heart sounds. No murmur heard.     No friction rub. No gallop.   Pulmonary:      Effort: Pulmonary effort is normal. No respiratory distress.      Breath sounds: Normal breath sounds. No wheezing, rhonchi or rales.   Musculoskeletal:      Right lower leg: No edema.      Left lower leg: No edema.   Skin:     General: Skin is warm and dry.   Neurological:      Mental Status: She is alert and oriented to person, place, and time.         Immunization History   Administered Date(s) Administered    31-influenza Vac Quardvalent Preservativ 01/17/2023    COVID-19 (MODERNA) 1st,2nd,3rd Dose Monovalent 09/20/2021, 10/18/2021    Fluzone  >6mos 10/18/2024    Fluzone (or Fluarix & Flulaval for VFC) >6mos 01/17/2023, 12/13/2023    Hepatitis A 11/02/2019    Pneumococcal Conjugate 20-Valent (PCV20) 07/21/2022    Tdap 04/23/2018       Results Review:   - I personally reviewed the pts imaging from CT scan of the chest from 9/12/2024 shows no concerning nodules, masses, or infiltrates, there is some scarring noted in the right middle lobe as well as the anterior portion of the left lower lobe.  - I personally reviewed the pts labs from August 2024, showed a CMP that was unremarkable, CBC with differential notable for elevated white count of 14,000  - I personally reviewed the pts PFT from 10/30/2024 shows moderate obstruction without restriction, significant air trapping and a normal DLCO  - I personally reviewed the pts Echo report from 2019 showed a EF of 56 to 60%, with no wall motion or valvular abnormalities  - I personally reviewed the pts chart with regards to evaluation by the patient's PCP    Assessment / Plan:   Diagnoses and all orders for this visit:    1. Lung nodule (Primary)  -There is no concerning lung nodule on the CT scan of  the chest, no follow-up needed for that.    2. Moderate persistent asthma without complication  -The patient does not have any emphysema.  She has underlying asthma.  We discussed that we would like her to have 1 or less exacerbations per year.  Right now she is not as well-controlled as I would like.  I recommend that initially started taking the Symbicort 2 puffs twice daily with albuterol every 4 hours as needed I explained her that there are multiple medications we could utilize over the course of time to get better control of this.  I still want her being very active with 30 minutes of cardiovascular exercise per day.  She verbalized understanding agree with the plan.    Follow Up:   Return in about 6 months (around 4/30/2025).     VIDAL Oden, DO  Pulmonary and Critical Care Medicine  Note Electronically Signed    Part of this note may be an electronic transcription/translation of spoken language to printed text using the Dragon Dictation System.

## 2024-11-03 LAB
NCCN CRITERIA FLAG: NORMAL
TYRER CUZICK SCORE: 10.1

## 2024-12-03 DIAGNOSIS — G25.81 RESTLESS LEG SYNDROME: ICD-10-CM

## 2024-12-03 RX ORDER — ROPINIROLE 0.5 MG/1
0.5 TABLET, FILM COATED ORAL NIGHTLY
Qty: 90 TABLET | Refills: 0 | Status: SHIPPED | OUTPATIENT
Start: 2024-12-03

## 2024-12-03 NOTE — TELEPHONE ENCOUNTER
Caller: Jud Garnica    Relationship: Self    Best call back number: [phone]     Requested Prescriptions:   Requested Prescriptions     Pending Prescriptions Disp Refills    rOPINIRole (REQUIP) 0.5 MG tablet 90 tablet 0     Sig: Take 1 tablet by mouth Every Night. 1 hour before bedtime        Pharmacy where request should be sent: [facility] [address] [place] [address] [phone] [place] [phone] [facility]     Last office visit with prescribing clinician: 10/18/2024   Last telemedicine visit with prescribing clinician: Visit date not found   Next office visit with prescribing clinician: 2/17/2025     Additional details provided by patient: PATIENT HAS CALLED REQUESTING A REFILL ON ABOVE MEDICATION.     Does the patient have less than a 3 day supply:  [x] Yes  [] No    Would you like a call back once the refill request has been completed: [] Yes [x] No    If the office needs to give you a call back, can they leave a voicemail: [] Yes [x] No    Jazmin Jarquin   12/03/24 16:04 EST

## 2025-01-12 DIAGNOSIS — K21.00 GASTROESOPHAGEAL REFLUX DISEASE WITH ESOPHAGITIS WITHOUT HEMORRHAGE: ICD-10-CM

## 2025-01-13 RX ORDER — ESOMEPRAZOLE MAGNESIUM 40 MG/1
40 CAPSULE, DELAYED RELEASE ORAL DAILY
Qty: 90 CAPSULE | Refills: 0 | Status: SHIPPED | OUTPATIENT
Start: 2025-01-13

## 2025-01-27 ENCOUNTER — OFFICE VISIT (OUTPATIENT)
Dept: ENDOCRINOLOGY | Facility: CLINIC | Age: 65
End: 2025-01-27
Payer: MEDICARE

## 2025-01-27 VITALS
DIASTOLIC BLOOD PRESSURE: 76 MMHG | SYSTOLIC BLOOD PRESSURE: 118 MMHG | HEART RATE: 80 BPM | OXYGEN SATURATION: 96 % | HEIGHT: 67 IN | WEIGHT: 133.8 LBS | BODY MASS INDEX: 21 KG/M2

## 2025-01-27 DIAGNOSIS — E04.1 THYROID NODULE: Primary | ICD-10-CM

## 2025-01-27 PROCEDURE — 1159F MED LIST DOCD IN RCRD: CPT | Performed by: INTERNAL MEDICINE

## 2025-01-27 PROCEDURE — 1160F RVW MEDS BY RX/DR IN RCRD: CPT | Performed by: INTERNAL MEDICINE

## 2025-01-27 PROCEDURE — 99203 OFFICE O/P NEW LOW 30 MIN: CPT | Performed by: INTERNAL MEDICINE

## 2025-01-27 NOTE — ASSESSMENT & PLAN NOTE
She has thyroid nodule that is mildly suspicious but doesn't meet criteria for FNA.  She has been euthyroid.  We discussed the diagnosis and risk of malignancy (5%).  We discussed options including observation vs FNA vs surgery.  I have recommended observation with another u/s in a year.

## 2025-01-27 NOTE — PROGRESS NOTES
"     Office Note      Date: 2025  Patient Name: Jud Garnica  MRN: [id number]  : 1960    Chief Complaint   Patient presents with    Thyroid Problem     nodule       History of Present Illness:   Jud Garnica is a 64 y.o. female who presents for Thyroid Problem (nodule)    She had chest CT done 2024.  Incidental note was made of thyroid nodule.  She had neck u/s done 10/2024.  This showed 2cm left TR3 nodule.  She hasn't noted any change in the size of her neck.  She denies any compressive sxs.  She has had esophageal dilation in the past.  She denies h/o hypo- or hyperthyroidism.  She hasn't taken thyroid meds.  She denies any sxs of hypo- or hyperthyroidism at this time.  She had labs done 2024.  The TSH was normal at 1.23.  Free T4 was normal also.      Subjective      Patient was born where: [place].  Facial radiation exposure: No.  High iodine intake: No  Family hx of thyroid disease: No.    Review of Systems:   Review of Systems   Constitutional: Negative.    HENT:  Positive for tinnitus.    Eyes: Negative.    Respiratory: Negative.     Cardiovascular: Negative.    Gastrointestinal: Negative.    Endocrine: Negative.    Genitourinary: Negative.    Musculoskeletal: Negative.    Skin: Negative.    Allergic/Immunologic: Negative.    Neurological: Negative.    Hematological: Negative.    Psychiatric/Behavioral: Negative.         The following portions of the patient's history were reviewed and updated as appropriate: allergies, current medications, past family history, past medical history, past social history, past surgical history, and problem list.    Objective     Visit Vitals  /76 (BP Location: Right arm, Patient Position: Sitting, Cuff Size: Adult)   Pulse 80   Ht 169.5 cm (66.73\")   Wt 60.7 kg (133 lb 12.8 oz)   LMP  (LMP Unknown)   SpO2 96%   BMI 21.12 kg/m²       Physical Exam:  Physical Exam  Constitutional:       Appearance: Normal appearance.   HENT:      Head: Normocephalic " "and atraumatic.   Eyes:      Extraocular Movements: Extraocular movements intact.      Conjunctiva/sclera: Conjunctivae normal.   Neck:      Thyroid: No thyroid mass, thyromegaly or thyroid tenderness.   Cardiovascular:      Rate and Rhythm: Normal rate and regular rhythm.      Pulses: Normal pulses.      Heart sounds: Normal heart sounds.   Pulmonary:      Effort: Pulmonary effort is normal.      Breath sounds: Normal breath sounds.   Abdominal:      General: Bowel sounds are normal.      Palpations: Abdomen is soft.   Musculoskeletal:         General: Normal range of motion.      Cervical back: Normal range of motion and neck supple.   Lymphadenopathy:      Cervical: No cervical adenopathy.   Skin:     General: Skin is warm and dry.   Neurological:      General: No focal deficit present.      Mental Status: She is alert.   Psychiatric:         Mood and Affect: Mood normal.         Behavior: Behavior normal.         Thought Content: Thought content normal.         Judgment: Judgment normal.         Labs:    TSH  No results found for: \"TSHBASE\"     Free T4  Free T4   Date Value Ref Range Status   08/15/2024 1.13 0.92 - 1.68 ng/dL Final       T3  No results found for: \"O1HVKZU\"      TPO  No results found for: \"THYROIDAB\"    TG AB  No results found for: \"THGAB\"    TG  No results found for: \"THYROGLB\"    CBC w/DIFF  Lab Results   Component Value Date    WBC 9.74 08/15/2024    RBC 4.69 08/15/2024    HGB 12.8 08/15/2024    HCT 40.4 08/15/2024    MCV 86.1 08/15/2024    MCH 27.3 08/15/2024    MCHC 31.7 08/15/2024    RDW 13.2 08/15/2024    RDWSD 41.0 08/15/2024    MPV 9.1 08/15/2024     08/15/2024    NEUTRORELPCT 76.9 (H) 08/15/2024    LYMPHORELPCT 14.5 (L) 08/15/2024    MONORELPCT 7.3 08/15/2024    EOSRELPCT 0.6 08/15/2024    BASORELPCT 0.3 08/15/2024    AUTOIGPER 0.4 08/15/2024    NEUTROABS 7.49 (H) 08/15/2024    LYMPHSABS 1.41 08/15/2024    MONOSABS 0.71 08/15/2024    EOSABS 0.06 08/15/2024    BASOSABS 0.03 " 08/15/2024    AUTOIGNUM 0.04 08/15/2024    NRBC 0.0 08/15/2024           Assessment / Plan      Assessment & Plan:  Diagnoses and all orders for this visit:    1. Thyroid nodule (Primary)  Assessment & Plan:  She has thyroid nodule that is mildly suspicious but doesn't meet criteria for FNA.  She has been euthyroid.  We discussed the diagnosis and risk of malignancy (5%).  We discussed options including observation vs FNA vs surgery.  I have recommended observation with another u/s in a year.           Current Outpatient Medications   Medication Instructions    albuterol sulfate  (90 Base) MCG/ACT inhaler 2 puffs, Inhalation, Every 4 Hours PRN    budesonide-formoterol (Symbicort) 80-4.5 MCG/ACT inhaler 2 puffs, Inhalation, 2 Times Daily - RT    esomeprazole (NEXIUM) 40 mg, Oral, Daily    ipratropium-albuterol (DUO-NEB) 0.5-2.5 mg/3 ml nebulizer 3 mL, Nebulization, 4 Times Daily - RT    Multiple Vitamins-Minerals (MULTIVITAMINS/MINERALS ADULT PO) Take  by mouth.    rOPINIRole (REQUIP) 0.5 mg, Oral, Nightly, 1 hour before bedtime      Return in about 6 months (around 7/27/2025) for Recheck with TSH.    Electronically signed by: Skip Villa MD  01/27/2025

## 2025-02-14 ENCOUNTER — HOSPITAL ENCOUNTER (OUTPATIENT)
Dept: MAMMOGRAPHY | Facility: HOSPITAL | Age: 65
Discharge: HOME | End: 2025-02-14
Admitting: STUDENT IN AN ORGANIZED HEALTH CARE EDUCATION/TRAINING PROGRAM
Payer: MEDICARE

## 2025-02-14 DIAGNOSIS — Z12.31 BREAST CANCER SCREENING BY MAMMOGRAM: ICD-10-CM

## 2025-02-14 PROCEDURE — 77063 BREAST TOMOSYNTHESIS BI: CPT

## 2025-02-14 PROCEDURE — 77067 SCR MAMMO BI INCL CAD: CPT

## 2025-02-17 ENCOUNTER — OFFICE VISIT (OUTPATIENT)
Dept: INTERNAL MEDICINE | Facility: CLINIC | Age: 65
End: 2025-02-17
Payer: MEDICARE

## 2025-02-17 VITALS
TEMPERATURE: 97.8 F | SYSTOLIC BLOOD PRESSURE: 122 MMHG | WEIGHT: 134.38 LBS | RESPIRATION RATE: 18 BRPM | BODY MASS INDEX: 21.22 KG/M2 | DIASTOLIC BLOOD PRESSURE: 70 MMHG | HEART RATE: 78 BPM

## 2025-02-17 DIAGNOSIS — Z12.4 CERVICAL CANCER SCREENING: ICD-10-CM

## 2025-02-17 DIAGNOSIS — J45.40 MODERATE PERSISTENT ASTHMA WITHOUT COMPLICATION: ICD-10-CM

## 2025-02-17 DIAGNOSIS — E04.1 THYROID NODULE: Primary | ICD-10-CM

## 2025-02-17 PROCEDURE — 1160F RVW MEDS BY RX/DR IN RCRD: CPT | Performed by: STUDENT IN AN ORGANIZED HEALTH CARE EDUCATION/TRAINING PROGRAM

## 2025-02-17 PROCEDURE — 1126F AMNT PAIN NOTED NONE PRSNT: CPT | Performed by: STUDENT IN AN ORGANIZED HEALTH CARE EDUCATION/TRAINING PROGRAM

## 2025-02-17 PROCEDURE — 1159F MED LIST DOCD IN RCRD: CPT | Performed by: STUDENT IN AN ORGANIZED HEALTH CARE EDUCATION/TRAINING PROGRAM

## 2025-02-17 PROCEDURE — 99214 OFFICE O/P EST MOD 30 MIN: CPT | Performed by: STUDENT IN AN ORGANIZED HEALTH CARE EDUCATION/TRAINING PROGRAM

## 2025-02-17 PROCEDURE — G2211 COMPLEX E/M VISIT ADD ON: HCPCS | Performed by: STUDENT IN AN ORGANIZED HEALTH CARE EDUCATION/TRAINING PROGRAM

## 2025-02-17 NOTE — PROGRESS NOTES
Follow Up Office Visit      Date: 2025   Patient Name: Jud Garnica  : 1960   MRN: [id number]     Chief Complaint:    Chief Complaint   Patient presents with    Asthma     Follow up        History of Present Illness: Jud Garnica is a 64 y.o. female who is here today to follow up with the following problems.    HPI  History of Present Illness    She has been under the care of Dr. Oden since 10/2024, with a follow-up appointment scheduled for tomorrow. She reports satisfactory respiratory function, attributing it to the use of Symbicort, which she administers as needed. She is not experiencing any episodes of wheezing or shortness of breath.    She had a consultation with Dr. Villa last month, during which an ultrasound was performed. She expresses disappointment at the lack of further diagnostic tests. A cyst and a nodule were identified on her thyroid, of which she was previously unaware. She reports no difficulty in swallowing.    She has received her influenza vaccine and declines further COVID-19 vaccinations. She does not regularly consult with a gynecologist and has not undergone a Pap smear since .    MEDICATIONS  Symbicort    IMMUNIZATIONS  She has received the influenza vaccine.      Asthma  Lung Nodule (not visualized, no f/u needed)  - I personally reviewed note by Dr. Kelvin Oden of pulmonology dated 10/30/24. At that time felt patient has underlying asthma, recommended symbicort 2 puffs BID. F/u 6 months    - I personally reviewed patient's PFT's from 10/30/24 which showed moderate obstruction wihtout restriciont, normal DLCO      Thyroid nodule  - I personally reviewed note by endocrinology Dr. Skip Villa dated 25. They discussed imaging and labs, recommended monitoring with repeat US in 1 year. Scheduled f/u 25.    Subjective      Review of Systems:   Review of Systems    I have reviewed the patients family history, social history, past medical  "history, past surgical history and have updated it as appropriate.     Medications:     Current Outpatient Medications:     albuterol sulfate  (90 Base) MCG/ACT inhaler, Inhale 2 puffs Every 4 (Four) Hours As Needed for Wheezing., Disp: 18 g, Rfl: 5    budesonide-formoterol (Symbicort) 80-4.5 MCG/ACT inhaler, Inhale 2 puffs 2 (Two) Times a Day., Disp: 10.2 g, Rfl: 12    esomeprazole (nexIUM) 40 MG capsule, Take 1 capsule by mouth once daily, Disp: 90 capsule, Rfl: 0    ipratropium-albuterol (DUO-NEB) 0.5-2.5 mg/3 ml nebulizer, Take 3 mL by nebulization 4 (Four) Times a Day., Disp: 360 mL, Rfl: 0    Multiple Vitamins-Minerals (MULTIVITAMINS/MINERALS ADULT PO), Take  by mouth., Disp: , Rfl:     rOPINIRole (REQUIP) 0.5 MG tablet, Take 1 tablet by mouth Every Night. 1 hour before bedtime, Disp: 90 tablet, Rfl: 0    Allergies:   Allergies   Allergen Reactions    Codeine Irritability    Nyquil Multi-Symptom [Pseudoeph-Doxylamine-Dm-Apap] Irritability    Meclizine Other (See Comments) and GI Intolerance     \"Not sure, but knows it doesn't make her feel good\"       Objective     Physical Exam: Please see above  Vital Signs:   Vitals:    02/17/25 0841   BP: 122/70   BP Location: Left arm   Patient Position: Sitting   Cuff Size: Adult   Pulse: 78   Resp: 18   Temp: 97.8 °F (36.6 °C)   TempSrc: Temporal   Weight: 61 kg (134 lb 6 oz)   PainSc: 0-No pain     Body mass index is 21.22 kg/m².  BMI is within normal parameters. No other follow-up for BMI required.       Physical Exam  Vitals reviewed.   Constitutional:       General: She is not in acute distress.     Appearance: Normal appearance. She is normal weight. She is not ill-appearing or toxic-appearing.   HENT:      Head: Normocephalic and atraumatic.      Right Ear: External ear normal.      Left Ear: External ear normal.      Nose: Nose normal.      Mouth/Throat:      Mouth: Mucous membranes are moist.   Eyes:      Extraocular Movements: Extraocular movements " intact.   Neck:      Comments: Unable to appreciate thyroid nodule to palpation, stable from prior  Cardiovascular:      Rate and Rhythm: Normal rate and regular rhythm.      Pulses: Normal pulses.      Heart sounds: Normal heart sounds.   Pulmonary:      Effort: Pulmonary effort is normal. No respiratory distress.      Breath sounds: Normal breath sounds. No stridor. No wheezing, rhonchi or rales.   Abdominal:      General: Abdomen is flat. There is no distension.   Musculoskeletal:      Cervical back: Normal range of motion. No rigidity or tenderness.   Lymphadenopathy:      Cervical: No cervical adenopathy.   Skin:     General: Skin is warm and dry.      Capillary Refill: Capillary refill takes less than 2 seconds.   Neurological:      General: No focal deficit present.      Mental Status: She is alert and oriented to person, place, and time.   Psychiatric:         Mood and Affect: Mood normal.         Behavior: Behavior normal.         Judgment: Judgment normal.       Physical Exam        Procedures    Results:   Labs:   TSH   Date Value Ref Range Status   08/15/2024 1.230 0.270 - 4.200 uIU/mL Final      Results        Imaging:   No valid procedures specified.     Assessment / Plan      Assessment/Plan:   Problem List Items Addressed This Visit       Moderate persistent asthma without complication    Current Assessment & Plan     Chronic, improving. Counseled on daily use of symbicort 80-4.5mcg 2 puff BID. Continue f/u with pulmonology.                Thyroid nodule - Primary    Current Assessment & Plan     Chronic, stable. Continue follow up with endocrinology for repeat labs later this year and repeat US due 10/2025.          Other Visit Diagnoses       Cervical cancer screening        Relevant Orders    Ambulatory Referral to Gynecology            Assessment & Plan  1. Asthma.  Her pulmonary function tests indicate a diagnosis of asthma. She reports good breathing since starting Symbicort but admits to not  using it daily. She is advised to adhere to the prescribed regimen of Symbicort, taking 2 puffs twice daily, ideally before brushing her teeth in the morning and before bedtime. This will help manage her asthma symptoms more effectively.    2. Thyroid nodule.  She has been diagnosed with a thyroid nodule and a cyst, as confirmed by ultrasound. She saw Dr. Villa last month, who recommended a follow-up in mid-summer with repeat labs. She is advised to follow up with Dr. Villa in July for repeat tests and labs to ensure the thyroid is not overproducing thryoid hormone. A repeat ultrasound is recommended for the fall.    3. Health maintenance.  She has received her influenza vaccine and declines further COVID-19 vaccinations. She does not regularly consult with a gynecologist and has not undergone a Pap smear since 2022. A referral will be made for a Pap smear, which is due this year. She prefers to have it done within the clinic or nearby. If there are any issues with scheduling, alternative arrangements will be made with another provider within the clinic.    Follow-up  The patient will follow up in 6 months for her annual visit.       Follow Up:   Return in about 6 months (around 8/17/2025) for And schedule welcome to Medicare exam.        Dov Leon MD   [facility] Wilver Galan    Patient or patient representative verbalized consent for the use of Ambient Listening during the visit with  Dov Leon MD for chart documentation. 2/17/2025  08:55 EST

## 2025-02-17 NOTE — ASSESSMENT & PLAN NOTE
Chronic, improving. Counseled on daily use of symbicort 80-4.5mcg 2 puff BID. Continue f/u with pulmonology.

## 2025-02-17 NOTE — ASSESSMENT & PLAN NOTE
Chronic, stable. Continue follow up with endocrinology for repeat labs later this year and repeat US due 10/2025.

## 2025-02-18 ENCOUNTER — OFFICE VISIT (OUTPATIENT)
Dept: PULMONOLOGY | Facility: CLINIC | Age: 65
End: 2025-02-18
Payer: MEDICARE

## 2025-02-18 VITALS
OXYGEN SATURATION: 98 % | WEIGHT: 136 LBS | HEART RATE: 88 BPM | DIASTOLIC BLOOD PRESSURE: 78 MMHG | SYSTOLIC BLOOD PRESSURE: 126 MMHG | HEIGHT: 66 IN | BODY MASS INDEX: 21.86 KG/M2

## 2025-02-18 DIAGNOSIS — J45.40 MODERATE PERSISTENT ASTHMA WITHOUT COMPLICATION: Primary | ICD-10-CM

## 2025-02-18 DIAGNOSIS — Z91.09 MULTIPLE ENVIRONMENTAL ALLERGIES: ICD-10-CM

## 2025-02-18 PROCEDURE — 99214 OFFICE O/P EST MOD 30 MIN: CPT | Performed by: INTERNAL MEDICINE

## 2025-02-18 PROCEDURE — 1160F RVW MEDS BY RX/DR IN RCRD: CPT | Performed by: INTERNAL MEDICINE

## 2025-02-18 PROCEDURE — 1159F MED LIST DOCD IN RCRD: CPT | Performed by: INTERNAL MEDICINE

## 2025-02-18 NOTE — PROGRESS NOTES
"Follow Up Office Note       Patient Name: Jud Garnica    Referring Physician: No ref. provider found    Chief Complaint:    Chief Complaint   Patient presents with    Lung Nodule     Follow-up       History of Present Illness: Jud Garnica is a 64 y.o. female who is here today to follow-up care with Pulmonary.  Patient has a past medical history significant for allergic rhinitis and asthma.  Patient is doing very well right now no pneumonias or infections since I saw her last she is doing well with the Symbicort.    Review of Systems:   Review of Systems   Constitutional:  Negative for chills, fatigue and fever.   HENT:  Negative for congestion and voice change.    Eyes:  Negative for blurred vision.   Respiratory:  Negative for cough, shortness of breath and wheezing.    Cardiovascular:  Negative for chest pain.   Skin:  Negative for dry skin.   Hematological:  Negative for adenopathy.   Psychiatric/Behavioral:  Negative for agitation and depressed mood.        The following portions of the patient's history were reviewed and updated as appropriate: allergies, current medications, past family history, past medical history, past social history, past surgical history and problem list.    Physical Exam:  Vital Signs:   Vitals:    02/18/25 0816   BP: 126/78   Pulse: 88   SpO2: 98%   Weight: 61.7 kg (136 lb)   Height: 167.6 cm (66\")       Physical Exam  Vitals and nursing note reviewed.   Constitutional:       General: She is not in acute distress.     Appearance: She is well-developed and normal weight. She is not ill-appearing or toxic-appearing.   HENT:      Head: Normocephalic and atraumatic.   Cardiovascular:      Rate and Rhythm: Normal rate and regular rhythm.      Pulses: Normal pulses.      Heart sounds: Normal heart sounds. No murmur heard.     No friction rub. No gallop.   Pulmonary:      Effort: Pulmonary effort is normal. No respiratory distress.      Breath sounds: Normal breath sounds. No " wheezing, rhonchi or rales.   Musculoskeletal:      Right lower leg: No edema.      Left lower leg: No edema.   Skin:     General: Skin is warm and dry.   Neurological:      Mental Status: She is alert and oriented to person, place, and time.         Immunization History   Administered Date(s) Administered    31-influenza Vac Quardvalent Preservativ 01/17/2023    COVID-19 (MODERNA) 1st,2nd,3rd Dose Monovalent 09/20/2021, 10/18/2021    Fluzone  >6mos 10/18/2024    Fluzone (or Fluarix & Flulaval for VFC) >6mos 01/17/2023, 12/13/2023    Hepatitis A 11/02/2019    Pneumococcal Conjugate 20-Valent (PCV20) 07/21/2022    Tdap 04/23/2018       Results Review:   - CT scan of the chest from 9/12/2024 shows no concerning nodules, masses, or infiltrates, there is some scarring noted in the right middle lobe as well as the anterior portion of the left lower lobe.  - labs from August 2024, showed a CMP that was unremarkable, CBC with differential notable for elevated white count of 14,000  - PFT from 10/30/2024 shows moderate obstruction without restriction, significant air trapping and a normal DLCO  - Echo report from 2019 showed a EF of 56 to 60%, with no wall motion or valvular abnormalities    Assessment / Plan:   Diagnoses and all orders for this visit:    1. Moderate persistent asthma without complication (Primary)  2. Multiple environmental allergies  -Patient is doing well now with her asthma.  I wanted to continue on the Symbicort 80 mcg 2 puffs twice daily with albuterol every 4 hours as needed.  I wanted to go throughout the seasons and see where she starts having her triggers.  If she gets a pulmonary infection twice within the next 6 months I asked her to make an appointment prior to the next appointment so we can evaluate and escalate therapy more quickly.  Otherwise I will see her back in September.  She can continue to use second-generation antihistamine and Flonase for any other allergy problems in the meantime.   She verbalized understanding of this and agree with the plan.      Follow Up:   Return in about 6 months (around 8/18/2025).       VIDAL Oden,   Pulmonary and Critical Care Medicine  Note Electronically Signed    Part of this note may be an electronic transcription/translation of spoken language to printed text using the Dragon Dictation System.

## 2025-03-02 DIAGNOSIS — G25.81 RESTLESS LEG SYNDROME: ICD-10-CM

## 2025-03-03 RX ORDER — ROPINIROLE 0.5 MG/1
TABLET, FILM COATED ORAL
Qty: 90 TABLET | Refills: 0 | Status: SHIPPED | OUTPATIENT
Start: 2025-03-03

## 2025-04-09 DIAGNOSIS — K21.00 GASTROESOPHAGEAL REFLUX DISEASE WITH ESOPHAGITIS WITHOUT HEMORRHAGE: ICD-10-CM

## 2025-04-09 RX ORDER — ESOMEPRAZOLE MAGNESIUM 40 MG/1
40 CAPSULE, DELAYED RELEASE ORAL DAILY
Qty: 90 CAPSULE | Refills: 0 | Status: SHIPPED | OUTPATIENT
Start: 2025-04-09

## 2025-06-02 ENCOUNTER — TELEPHONE (OUTPATIENT)
Dept: INTERNAL MEDICINE | Facility: CLINIC | Age: 65
End: 2025-06-02
Payer: MEDICARE

## 2025-06-02 NOTE — TELEPHONE ENCOUNTER
She is c/o Left hip pain radiating down her left leg,  she has so much pain it is difficult to sit on the toilet   She states she wet to the chiropractor 3 days ago but it did not help.  Notified her we do not have any openings today but she could be seen at [facility] .   Verbal understanding given.  She will go there.

## 2025-06-05 DIAGNOSIS — G25.81 RESTLESS LEG SYNDROME: ICD-10-CM

## 2025-06-05 RX ORDER — ROPINIROLE 0.5 MG/1
0.5 TABLET, FILM COATED ORAL
Qty: 90 TABLET | Refills: 1 | Status: SHIPPED | OUTPATIENT
Start: 2025-06-05

## 2025-06-05 NOTE — TELEPHONE ENCOUNTER
Caller: [facility] [address] [place] [address] [phone] [place] [phone] FX    Relationship: Pharmacy    Best call back number: [phone]    Requested Prescriptions:   Requested Prescriptions     Pending Prescriptions Disp Refills    rOPINIRole (REQUIP) 0.5 MG tablet 90 tablet 0     Sig: Take 1 hour before bedtime.        Pharmacy where request should be sent: [facility] [address] [place] [address] [phone] [place] [phone]     Last office visit with prescribing clinician: 2/17/2025   Last telemedicine visit with prescribing clinician: Visit date not found   Next office visit with prescribing clinician: 8/19/2025     Additional details provided by patient: PATIENT IS OUT     Does the patient have less than a 3 day supply:  [x] Yes  [] No    Would you like a call back once the refill request has been completed: [] Yes [x] No    If the office needs to give you a call back, can they leave a voicemail: [] Yes [x] No    Serenity Lechuga, [facility] CenterPooja Rep   06/05/25 11:35 EDT

## 2025-06-23 ENCOUNTER — OFFICE VISIT (OUTPATIENT)
Dept: NEUROSURGERY | Facility: CLINIC | Age: 65
End: 2025-06-23
Payer: MEDICARE

## 2025-06-23 VITALS
HEIGHT: 66 IN | BODY MASS INDEX: 21.21 KG/M2 | WEIGHT: 132 LBS | SYSTOLIC BLOOD PRESSURE: 130 MMHG | TEMPERATURE: 96.6 F | DIASTOLIC BLOOD PRESSURE: 80 MMHG

## 2025-06-23 DIAGNOSIS — S32.050A COMPRESSION FRACTURE OF L5 VERTEBRA, INITIAL ENCOUNTER: ICD-10-CM

## 2025-06-23 DIAGNOSIS — G89.29 CHRONIC BILATERAL LOW BACK PAIN WITH LEFT-SIDED SCIATICA: Primary | ICD-10-CM

## 2025-06-23 DIAGNOSIS — M25.551 RIGHT HIP PAIN: ICD-10-CM

## 2025-06-23 DIAGNOSIS — M54.42 CHRONIC BILATERAL LOW BACK PAIN WITH LEFT-SIDED SCIATICA: Primary | ICD-10-CM

## 2025-06-23 PROCEDURE — 1159F MED LIST DOCD IN RCRD: CPT

## 2025-06-23 PROCEDURE — 1160F RVW MEDS BY RX/DR IN RCRD: CPT

## 2025-06-23 PROCEDURE — 99204 OFFICE O/P NEW MOD 45 MIN: CPT

## 2025-06-23 RX ORDER — METHOCARBAMOL 750 MG/1
750 TABLET, FILM COATED ORAL 3 TIMES DAILY
Qty: 60 TABLET | Refills: 1 | Status: SHIPPED | OUTPATIENT
Start: 2025-06-23

## 2025-06-23 NOTE — PROGRESS NOTES
"Name: Jud Garnica    : 1960     MRN: [id number]     Primary Care Provider: Dov Leon MD    Chief Complaint  Low back pain with radiation into the left lower extremity, left hip pain      History of Present Illness:  Jud Garnica is a 64 y.o. female who presents to the clinic today for evaluation of several years of worsening low back pain and left hip pain. She also reports episodic radiating pain into the left lower extremity that has been going on for the past few weeks. However, during my visit today she primarily endorses low back pain and left hip pain. Her radiating left lower extremity pain has actually improved within the past few days but prior to that it was radiating down into the bottom of her left foot. She has tried OTC medications and has been seeing a chiropractor without relief of her pain. Her PCP ordered an X-ray of the left hip and lumbar spine for my review today. She does not have much in the way of medical comorbidities other than some asthma. She does not smoke or drink. She does report a history of scoliosis.     The following portions of the patient's history were reviewed and updated as appropriate and include current medications, allergies, allergies, past family history, past medical history, past social history, past surgical history and problem list.     PMHX  Allergies:  Allergies   Allergen Reactions    Codeine Irritability    Nyquil Multi-Symptom [Pseudoeph-Doxylamine-Dm-Apap] Irritability    Meclizine Other (See Comments) and GI Intolerance     \"Not sure, but knows it doesn't make her feel good\"     Medications    Current Outpatient Medications:     albuterol sulfate  (90 Base) MCG/ACT inhaler, Inhale 2 puffs Every 4 (Four) Hours As Needed for Wheezing., Disp: 18 g, Rfl: 5    budesonide-formoterol (Symbicort) 80-4.5 MCG/ACT inhaler, Inhale 2 puffs 2 (Two) Times a Day., Disp: 10.2 g, Rfl: 12    esomeprazole (nexIUM) 40 MG capsule, Take 1 capsule by " mouth once daily, Disp: 90 capsule, Rfl: 0    ipratropium-albuterol (DUO-NEB) 0.5-2.5 mg/3 ml nebulizer, Take 3 mL by nebulization 4 (Four) Times a Day., Disp: 360 mL, Rfl: 0    Multiple Vitamins-Minerals (MULTIVITAMINS/MINERALS ADULT PO), Take  by mouth., Disp: , Rfl:     rOPINIRole (REQUIP) 0.5 MG tablet, Take 1 tablet by mouth every night at bedtime. 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    Migraine     Pneumonia     Scoliosis     Can't remember the correct date    Thyroid nodule     Uterine cancer      Past Surgical History:  Past Surgical History:   Procedure Laterality Date    BREAST BIOPSY Right 2018     bx    HYSTERECTOMY N/A     age 29, partial     Social Hx:  Social History     Tobacco Use    Smoking status: Never     Passive exposure: Never    Smokeless tobacco: Never   Vaping Use    Vaping status: Never Used   Substance Use Topics    Alcohol use: Not Currently     Comment: Do not Drink    Drug use: Never     Family Hx:  Family History   Problem Relation Age of Onset    Dementia Mother         smoker    COPD Mother     Early death Mother     Cancer Father         Lung    Breast cancer Sister     Cervical cancer Sister     Early death Sister         Internal  Cancer    Heart attack Maternal Grandfather     Anuerysm Maternal Grandfather     Heart attack Paternal Grandmother     Cancer Paternal Grandmother         lung    Dementia Paternal Grandfather     Miscarriages / Stillbirths Sister             Ovarian cancer Neg Hx      Review of Systems:        Review of Systems   Constitutional:  Negative for  activity change, appetite change, chills, diaphoresis, fatigue, fever and unexpected weight change.   HENT:  Negative for congestion, dental problem, drooling, ear discharge, ear pain, facial swelling, hearing loss, mouth sores, nosebleeds, postnasal drip, rhinorrhea, sinus pressure, sinus pain, sneezing, sore throat, tinnitus, trouble swallowing and voice change.    Eyes:  Negative for photophobia, pain, discharge, redness, itching and visual disturbance.   Respiratory:  Negative for apnea, cough, choking, chest tightness, shortness of breath, wheezing and stridor.    Cardiovascular:  Negative for chest pain, palpitations and leg swelling.   Gastrointestinal:  Negative for abdominal distention, abdominal pain, anal bleeding, blood in stool, constipation, diarrhea, nausea, rectal pain and vomiting.   Endocrine: Negative for cold intolerance, heat intolerance, polydipsia, polyphagia and polyuria.   Genitourinary:  Negative for decreased urine volume, difficulty urinating, dysuria, enuresis, flank pain, frequency, genital sores, hematuria and urgency.   Musculoskeletal:  Positive for back pain. Negative for arthralgias (left lef pain), gait problem, joint swelling, myalgias, neck pain and neck stiffness.   Skin:  Negative for color change, pallor, rash and wound.   Allergic/Immunologic: Negative for environmental allergies, food allergies and immunocompromised state.   Neurological:  Negative for dizziness, tremors, seizures, syncope, facial asymmetry, speech difficulty, weakness, light-headedness, numbness and headaches.   Hematological:  Negative for adenopathy. Does not bruise/bleed easily.   Psychiatric/Behavioral:  Negative for agitation, behavioral problems, confusion, decreased concentration, dysphoric mood, hallucinations, self-injury, sleep disturbance and suicidal ideas. The patient is not nervous/anxious and is not hyperactive.       The remaining review of systems is negative as otherwise stated in the  "HPI.    Vital Signs: /80 (BP Location: Left arm, Patient Position: Sitting, Cuff Size: Adult)   Temp 96.6 °F (35.9 °C) (Temporal)   Ht 167.6 cm (66\")   Wt 59.9 kg (132 lb)   BMI 21.31 kg/m²        Physical Exam  Vitals and nursing note reviewed.   Constitutional:       Appearance: Normal appearance.   HENT:      Head: Normocephalic and atraumatic.   Musculoskeletal:      Cervical back: Normal.      Thoracic back: Normal.      Lumbar back: Tenderness present.      Right lower leg: No edema.      Left lower leg: No edema.      Comments: Patient had 5 out of 5 strength with hip flexion, plantar and dorsiflexion bilaterally.  Pain with passive internal rotation of the left hip.   There is tenderness to palpation in the surrounding lumbar paraspinous musculature   Neurological:      Mental Status: She is alert.      Cranial Nerves: No cranial nerve deficit or facial asymmetry.      Sensory: Sensation is intact. No sensory deficit.      Motor: Motor function is intact. No weakness, tremor, atrophy, abnormal muscle tone or seizure activity.      Coordination: Romberg sign negative.      Gait: Gait is intact. Gait and tandem walk normal.      Deep Tendon Reflexes:      Reflex Scores:       Tricep reflexes are 2+ on the right side and 2+ on the left side.       Bicep reflexes are 2+ on the right side and 2+ on the left side.       Brachioradialis reflexes are 2+ on the right side and 2+ on the left side.       Patellar reflexes are 2+ on the right side and 2+ on the left side.       Achilles reflexes are 2+ on the right side and 2+ on the left side.     Comments: Slump test was negative bilaterally.    Intact vibratory and temperature sensation bilaterally.  No signs of ankle clonus bilaterally.  Was able to ambulate on heels and tiptoes free of pain.  Trevizo's negative bilaterally. No clonus.    Psychiatric:         Mood and Affect: Mood normal.         Behavior: Behavior normal.         Thought Content: Thought " content normal.         Judgment: Judgment normal.            Social History    Tobacco Use      Smoking status: Never        Passive exposure: Never      Smokeless tobacco: Never       Tobacco Use: Low Risk  (6/23/2025)    Patient History     Smoking Tobacco Use: Never     Smokeless Tobacco Use: Never     Passive Exposure: Never           STEADI Fall Risk Assessment was completed, and patient is at MODERATE risk for falls. Assessment completed on:6/23/2025      Diagnostic Studies: (All imaging is independently reviewed unless stated otherwise.)  Today I have for my review and x-ray of the lumbar spine that was completed on 6/2/2025.  There is some evidence of bone demineralization.  There is leftward curvature of the lumbar spine that is centered around the L3 vertebral body.  There is mild to moderate loss of height at the superior endplate of L5, with no bony retropulsion.    Also have for my review and x-ray of the pelvis and left hip.  There are some evidence of mild osteoarthritis.      Assessment/Plan    Diagnoses and all orders for this visit:    1. Chronic bilateral low back pain with left-sided sciatica (Primary)  -     XR Scoliosis Complete Including Supine & Erect; Future  -     MRI Lumbar Spine Without Contrast; Future    2. Right hip pain    3. Compression fracture of L5 vertebra, initial encounter  -     XR Scoliosis Complete Including Supine & Erect; Future  -     MRI Lumbar Spine Without Contrast; Future    Other orders  -     methocarbamol (ROBAXIN) 750 MG tablet; Take 1 tablet by mouth 3 (Three) Times a Day.  Dispense: 60 tablet; Refill: 1         This is a 64-year-old female who presents to our office with a several year history of low back pain.  She has noticed that when in the past few months her low back pain has progressively worsened and a few weeks ago she began to notice episodic radiating pain into the left lower extremity.  She primarily endorses low back pain and left hip pain.  It  sounds like her lower extremity radiating pain is resolving.  Her lumbar x-rays reveal an L5 compression fracture.  I would like to move forward with an MRI of the lumbar spine to further evaluate her compression fracture and determine if it is acute or chronic.  Also need an MRI to rule out if there is any underlying nerve root compression.  She also tells me that she has a history of scoliosis.  I am also going to order scoliosis films to assess the degree of scoliosis in her lumbar spine.  I am going to send her in some Robaxin to help with muscle spasms as well.  She has not tried any physical therapy or pain management.  I would like for her to follow-up with me once the aforementioned studies have been complete, or sooner if clinically indicated.  If her lumbar MRI is benign, I would consider moving forward with an MRI of the hip as she did have some pain with passive internal rotation of the left hip on physical examination.  Patient encouraged to contact us if there are any changes in her condition or any concerns. Signs and symptoms that would require further urgent/emergent workup, specifically those associated with cauda equina syndrome, or for patient to reach out to our office were reviewed.    Any copied data from previous notes included in the (1) HPI, (2) PE, (3) MDM and/or Assessment and Plan has been reviewed and accurate as of 06/23/25.    Melinda Mojica PA-C  06/23/25

## 2025-07-10 DIAGNOSIS — K21.00 GASTROESOPHAGEAL REFLUX DISEASE WITH ESOPHAGITIS WITHOUT HEMORRHAGE: ICD-10-CM

## 2025-07-10 RX ORDER — ESOMEPRAZOLE MAGNESIUM 40 MG/1
40 CAPSULE, DELAYED RELEASE ORAL DAILY
Qty: 90 CAPSULE | Refills: 0 | Status: SHIPPED | OUTPATIENT
Start: 2025-07-10

## 2025-07-16 ENCOUNTER — HOSPITAL ENCOUNTER (OUTPATIENT)
Dept: MRI IMAGING | Facility: HOSPITAL | Age: 65
Discharge: HOME | End: 2025-07-16
Payer: MEDICARE

## 2025-07-16 DIAGNOSIS — M54.42 CHRONIC BILATERAL LOW BACK PAIN WITH LEFT-SIDED SCIATICA: ICD-10-CM

## 2025-07-16 DIAGNOSIS — G89.29 CHRONIC BILATERAL LOW BACK PAIN WITH LEFT-SIDED SCIATICA: ICD-10-CM

## 2025-07-16 DIAGNOSIS — S32.050A COMPRESSION FRACTURE OF L5 VERTEBRA, INITIAL ENCOUNTER: ICD-10-CM

## 2025-07-16 PROCEDURE — 72148 MRI LUMBAR SPINE W/O DYE: CPT

## 2025-07-21 ENCOUNTER — OFFICE VISIT (OUTPATIENT)
Dept: NEUROSURGERY | Facility: CLINIC | Age: 65
End: 2025-07-21
Payer: MEDICARE

## 2025-07-21 ENCOUNTER — HOSPITAL ENCOUNTER (OUTPATIENT)
Dept: GENERAL RADIOLOGY | Facility: HOSPITAL | Age: 65
Discharge: HOME | End: 2025-07-21
Payer: MEDICARE

## 2025-07-21 VITALS — HEIGHT: 66 IN | TEMPERATURE: 95.3 F | BODY MASS INDEX: 21.65 KG/M2 | WEIGHT: 134.7 LBS

## 2025-07-21 DIAGNOSIS — M54.42 CHRONIC BILATERAL LOW BACK PAIN WITH LEFT-SIDED SCIATICA: ICD-10-CM

## 2025-07-21 DIAGNOSIS — S32.050A COMPRESSION FRACTURE OF L5 VERTEBRA, INITIAL ENCOUNTER: ICD-10-CM

## 2025-07-21 DIAGNOSIS — G89.29 CHRONIC BILATERAL LOW BACK PAIN WITHOUT SCIATICA: Primary | ICD-10-CM

## 2025-07-21 DIAGNOSIS — M54.50 CHRONIC BILATERAL LOW BACK PAIN WITHOUT SCIATICA: Primary | ICD-10-CM

## 2025-07-21 DIAGNOSIS — G89.29 CHRONIC BILATERAL LOW BACK PAIN WITH LEFT-SIDED SCIATICA: ICD-10-CM

## 2025-07-21 PROCEDURE — 72082 X-RAY EXAM ENTIRE SPI 2/3 VW: CPT

## 2025-07-21 NOTE — PROGRESS NOTES
"Name: Jud Garnica    : 1960     MRN: [id number]     Primary Care Provider: Dov Leon MD    Chief Complaint  Chronic bilateral low back pain with left-sided sciatica      History of Present Illness:  Jud Garnica is a 64 y.o. female whom I saw last month for evaluation of a several year history of progressively worsening low back pain. At the time, she also reported an episode that lasted a few days of radiating pain into her left lower extremity, concerning for lumbar/sacral radiculopathy. Ultimately, I ordered an MRI of her lumbar spine that she has for my review today. She denies any new or worsening symptoms since our last visit. She primarily endorses low back pain and some left hip pain.     The following portions of the patient's history were reviewed and updated as appropriate and include current medications, allergies, allergies, past family history, past medical history, past social history, past surgical history and problem list.     PMHX  Allergies:  Allergies   Allergen Reactions    Codeine Irritability    Nyquil Multi-Symptom [Pseudoeph-Doxylamine-Dm-Apap] Irritability    Meclizine Other (See Comments) and GI Intolerance     \"Not sure, but knows it doesn't make her feel good\"     Medications    Current Outpatient Medications:     albuterol sulfate  (90 Base) MCG/ACT inhaler, Inhale 2 puffs Every 4 (Four) Hours As Needed for Wheezing., Disp: 18 g, Rfl: 5    budesonide-formoterol (Symbicort) 80-4.5 MCG/ACT inhaler, Inhale 2 puffs 2 (Two) Times a Day., Disp: 10.2 g, Rfl: 12    esomeprazole (nexIUM) 40 MG capsule, Take 1 capsule by mouth once daily, Disp: 90 capsule, Rfl: 0    ipratropium-albuterol (DUO-NEB) 0.5-2.5 mg/3 ml nebulizer, Take 3 mL by nebulization 4 (Four) Times a Day., Disp: 360 mL, Rfl: 0    Multiple Vitamins-Minerals (MULTIVITAMINS/MINERALS ADULT PO), Take  by mouth., Disp: , Rfl:     rOPINIRole (REQUIP) 0.5 MG tablet, Take 1 tablet by mouth every night at " bedtime. 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    Infectious viral hepatitis     Migraine     Pneumonia 2018    Scoliosis     Can't remember the correct date    Thyroid nodule     Uterine cancer      Past Surgical History:  Past Surgical History:   Procedure Laterality Date    BREAST BIOPSY Right 2018     bx    HYSTERECTOMY N/A     age 29, partial     Social Hx:  Social History     Tobacco Use    Smoking status: Never     Passive exposure: Never    Smokeless tobacco: Never   Vaping Use    Vaping status: Never Used   Substance Use Topics    Alcohol use: Not Currently     Comment: Do not Drink    Drug use: Never     Family Hx:  Family History   Problem Relation Age of Onset    Dementia Mother         smoker    COPD Mother     Early death Mother     Cancer Father         Lung    Breast cancer Sister     Cervical cancer Sister     Early death Sister         Internal  Cancer    Alcohol abuse Sister     Heart attack Maternal Grandfather     Anuerysm Maternal Grandfather     Heart attack Paternal Grandmother     Cancer Paternal Grandmother         lung    Dementia Paternal Grandfather     Miscarriages / Stillbirths Sister             Drug abuse Sister         Drugs and alcohol    Alcohol abuse Sister     Drug abuse Sister         Drugs and alcohol    Ovarian cancer Neg Hx      Review of Systems:        Review of Systems   Constitutional:  Negative for activity change, appetite change, chills, diaphoresis, fatigue, fever and unexpected weight change.   HENT:  Negative for congestion, dental  "problem, drooling, ear discharge, ear pain, facial swelling, hearing loss, mouth sores, nosebleeds, postnasal drip, rhinorrhea, sinus pressure, sinus pain, sneezing, sore throat, tinnitus, trouble swallowing and voice change.    Eyes:  Negative for photophobia, pain, discharge, redness, itching and visual disturbance.   Respiratory:  Negative for apnea, cough, choking, chest tightness, shortness of breath, wheezing and stridor.    Cardiovascular:  Negative for chest pain, palpitations and leg swelling.   Gastrointestinal:  Negative for abdominal distention, abdominal pain, anal bleeding, blood in stool, constipation, diarrhea, nausea, rectal pain and vomiting.   Endocrine: Negative for cold intolerance, heat intolerance, polydipsia, polyphagia and polyuria.   Genitourinary:  Negative for decreased urine volume, difficulty urinating, dysuria, enuresis, flank pain, frequency, genital sores, hematuria and urgency.   Musculoskeletal:  Positive for back pain. Negative for arthralgias (left lef pain), gait problem, joint swelling, myalgias, neck pain and neck stiffness.   Skin:  Negative for color change, pallor, rash and wound.   Allergic/Immunologic: Negative for environmental allergies, food allergies and immunocompromised state.   Neurological:  Negative for dizziness, tremors, seizures, syncope, facial asymmetry, speech difficulty, weakness, light-headedness, numbness and headaches.   Hematological:  Negative for adenopathy. Does not bruise/bleed easily.   Psychiatric/Behavioral:  Negative for agitation, behavioral problems, confusion, decreased concentration, dysphoric mood, hallucinations, self-injury, sleep disturbance and suicidal ideas. The patient is not nervous/anxious and is not hyperactive.       The remaining review of systems is negative as otherwise stated in the HPI.    Vital Signs: Temp 95.3 °F (35.2 °C) (Temporal)   Ht 167.6 cm (66\")   Wt 61.1 kg (134 lb 11.2 oz)   BMI 21.74 kg/m²        Physical " Exam  Vitals and nursing note reviewed.   Constitutional:       Appearance: Normal appearance.   HENT:      Head: Normocephalic and atraumatic.   Musculoskeletal:      Cervical back: Normal.      Thoracic back: Normal.      Lumbar back: Tenderness present.      Right lower leg: No edema.      Left lower leg: No edema.      Comments: Patient had 5 out of 5 strength with hip flexion, plantar and dorsiflexion bilaterally.     Neurological:      Mental Status: She is alert.      Cranial Nerves: No cranial nerve deficit or facial asymmetry.      Sensory: Sensation is intact. No sensory deficit.      Motor: Motor function is intact. No weakness, tremor, atrophy, abnormal muscle tone or seizure activity.      Coordination: Romberg sign negative.      Gait: Gait is intact. Gait and tandem walk normal.      Deep Tendon Reflexes:      Reflex Scores:       Patellar reflexes are 2+ on the right side and 2+ on the left side.       Achilles reflexes are 2+ on the right side and 2+ on the left side.     Comments: Slump test was negative bilaterally.    Intact vibratory and temperature sensation bilaterally.  No signs of ankle clonus bilaterally.     Psychiatric:         Mood and Affect: Mood normal.         Behavior: Behavior normal.         Thought Content: Thought content normal.         Judgment: Judgment normal.            Social History    Tobacco Use      Smoking status: Never        Passive exposure: Never      Smokeless tobacco: Never       Tobacco Use: Low Risk  (7/21/2025)    Patient History     Smoking Tobacco Use: Never     Smokeless Tobacco Use: Never     Passive Exposure: Never           STEADI Fall Risk Assessment was completed, and patient is at MODERATE risk for falls. Assessment completed on:6/23/2025      Diagnostic Studies: (All imaging is independently reviewed unless stated otherwise.)  Today I have for my review an MRI of the lumbar spine that was completed on 7/16/2025. There is mild vertebral body height  loss at L5, consistent with chronic appearing mild compression fracture. There are multilevel degenerative changes throughout. At L4-5, there is a central disc herniation resulting in mild borderline moderate central canal stenosis and bilateral lateral recess stenosis. At L5-S1, there is some mild foraminal narrowing. 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 No evidence of lumbar or sacral nerve root compromise.       Assessment/Plan    Diagnoses and all orders for this visit:    1. Chronic bilateral low back pain without sciatica (Primary)  -     Ambulatory Referral to Physical Therapy for Evaluation & Treatment  -     Ambulatory Referral to Pain Management    Other orders  -     tiZANidine (Zanaflex) 4 MG tablet; Take 1 tablet by mouth At Night As Needed for Muscle Spasms.  Dispense: 30 tablet; Refill: 1       This is a 64-year-old female who presents to our office with a several year history of low back pain.  She has noticed that when in the past few months her low back pain has progressively worsened and a few weeks ago she began to notice episodic radiating pain into the left lower extremity that has since resolved.  She primarily endorses low back pain and left hip pain.  Ultimately, I ordered an MRI of the lumbar spine that she has for my review today. There is no obvious evidence of lumbosacral nerve root impingement on the basis of this study and it sounds as if her radicular pain in the LLE has since resolved. She is primarily endorsing low back pain during my visit today. She has not tried any physical therapy or interventional pain management. I am going to place a referral for her to start some physical therapy and for her to see pain management to discuss treatments moving forward.  Patient encouraged to contact us if there are any changes in her condition or any concerns. I did prescribe her some Robaxin at our last visit, but she reports that this did not work. I am  going to discontinue this prescription and send her in a new muscle relaxant. Signs and symptoms that would require further urgent/emergent workup, specifically those associated with cauda equina syndrome, or for patient to reach out to our office were reviewed. I would be happy to see her back if she were to fail conservative treatments or if her radicular symptoms were to return.       Any copied data from previous notes included in the (1) HPI, (2) PE, (3) MDM and/or Assessment and Plan has been reviewed and accurate as of 07/21/25.    Melinda Mojica PA-C  07/21/25

## 2025-08-08 ENCOUNTER — TELEPHONE (OUTPATIENT)
Dept: PAIN MEDICINE | Facility: CLINIC | Age: 65
End: 2025-08-08
Payer: MEDICARE

## 2025-08-19 ENCOUNTER — OFFICE VISIT (OUTPATIENT)
Dept: ENDOCRINOLOGY | Facility: CLINIC | Age: 65
End: 2025-08-19
Payer: MEDICARE

## 2025-08-19 ENCOUNTER — RESULTS FOLLOW-UP (OUTPATIENT)
Dept: ENDOCRINOLOGY | Facility: CLINIC | Age: 65
End: 2025-08-19

## 2025-08-19 VITALS
WEIGHT: 135.2 LBS | DIASTOLIC BLOOD PRESSURE: 66 MMHG | OXYGEN SATURATION: 98 % | SYSTOLIC BLOOD PRESSURE: 118 MMHG | HEART RATE: 78 BPM | BODY MASS INDEX: 21.73 KG/M2 | HEIGHT: 66 IN

## 2025-08-19 DIAGNOSIS — E04.1 THYROID NODULE: Primary | ICD-10-CM

## 2025-08-19 LAB — TSH SERPL DL<=0.05 MIU/L-ACNC: 1.49 UIU/ML (ref 0.27–4.2)

## 2025-08-19 PROCEDURE — 84443 ASSAY THYROID STIM HORMONE: CPT | Performed by: INTERNAL MEDICINE

## 2025-08-19 PROCEDURE — 99213 OFFICE O/P EST LOW 20 MIN: CPT | Performed by: INTERNAL MEDICINE

## 2025-08-19 PROCEDURE — 1160F RVW MEDS BY RX/DR IN RCRD: CPT | Performed by: INTERNAL MEDICINE

## 2025-08-19 PROCEDURE — 76536 US EXAM OF HEAD AND NECK: CPT | Performed by: INTERNAL MEDICINE

## 2025-08-19 PROCEDURE — 36415 COLL VENOUS BLD VENIPUNCTURE: CPT | Performed by: INTERNAL MEDICINE

## 2025-08-19 PROCEDURE — 1159F MED LIST DOCD IN RCRD: CPT | Performed by: INTERNAL MEDICINE

## 2025-08-25 ENCOUNTER — OFFICE VISIT (OUTPATIENT)
Dept: PAIN MEDICINE | Facility: CLINIC | Age: 65
End: 2025-08-25
Payer: MEDICARE

## 2025-08-25 VITALS — WEIGHT: 134.4 LBS | HEIGHT: 66 IN | BODY MASS INDEX: 21.6 KG/M2

## 2025-08-25 DIAGNOSIS — M70.61 TROCHANTERIC BURSITIS OF RIGHT HIP: Primary | ICD-10-CM

## 2025-08-25 DIAGNOSIS — G89.4 CHRONIC PAIN SYNDROME: ICD-10-CM

## 2025-08-25 PROCEDURE — 1125F AMNT PAIN NOTED PAIN PRSNT: CPT | Performed by: STUDENT IN AN ORGANIZED HEALTH CARE EDUCATION/TRAINING PROGRAM

## 2025-08-25 PROCEDURE — 1160F RVW MEDS BY RX/DR IN RCRD: CPT | Performed by: STUDENT IN AN ORGANIZED HEALTH CARE EDUCATION/TRAINING PROGRAM

## 2025-08-25 PROCEDURE — 99204 OFFICE O/P NEW MOD 45 MIN: CPT | Performed by: STUDENT IN AN ORGANIZED HEALTH CARE EDUCATION/TRAINING PROGRAM

## 2025-08-25 PROCEDURE — 1159F MED LIST DOCD IN RCRD: CPT | Performed by: STUDENT IN AN ORGANIZED HEALTH CARE EDUCATION/TRAINING PROGRAM

## 2025-08-25 PROCEDURE — G2211 COMPLEX E/M VISIT ADD ON: HCPCS | Performed by: STUDENT IN AN ORGANIZED HEALTH CARE EDUCATION/TRAINING PROGRAM

## 2025-08-26 ENCOUNTER — PATIENT ROUNDING (BHMG ONLY) (OUTPATIENT)
Dept: PAIN MEDICINE | Facility: CLINIC | Age: 65
End: 2025-08-26
Payer: MEDICARE